# Patient Record
Sex: FEMALE | Race: NATIVE HAWAIIAN OR OTHER PACIFIC ISLANDER
[De-identification: names, ages, dates, MRNs, and addresses within clinical notes are randomized per-mention and may not be internally consistent; named-entity substitution may affect disease eponyms.]

---

## 2021-12-03 ENCOUNTER — HOSPITAL ENCOUNTER (OUTPATIENT)
Dept: HOSPITAL 47 - RADUSWWP | Age: 66
Discharge: HOME | End: 2021-12-03
Payer: MEDICARE

## 2021-12-03 DIAGNOSIS — R10.9: ICD-10-CM

## 2021-12-03 DIAGNOSIS — R31.29: Primary | ICD-10-CM

## 2021-12-03 PROCEDURE — 76770 US EXAM ABDO BACK WALL COMP: CPT

## 2021-12-03 NOTE — US
EXAMINATION TYPE: US kidneys/renal and bladder

 

DATE OF EXAM: 12/3/2021

 

COMPARISON: NONE

 

CLINICAL HISTORY: R31.9 HEMATURIA. Microscopic hematuria.  Left flank pain. 

 

EXAM MEASUREMENTS:

 

Right Kidney:  9.0 x 4.3 x 4.7 cm

Left Kidney: 9.8 x 4.0 x 4.7 cm

 

 

 

Right Kidney: No hydronephrosis or masses seen  

Left Kidney: No hydronephrosis or masses seen  

Bladder: mildly distended, not well visualized

**Bilateral Jets not seen

 

 

There is no evidence for hydronephrosis at this point in time.  No nephrolithiasis is seen.  No sherry
s are identified.  The urinary bladder is not greatly distended.  Bilateral ureteral jets are not see
n.

 

 

 

IMPRESSION: Source of hematuria not identified. If symptoms persist further investigation with CT uro
gram would be warranted.

## 2022-05-25 ENCOUNTER — HOSPITAL ENCOUNTER (OUTPATIENT)
Dept: HOSPITAL 47 - RADMAMWWP | Age: 67
Discharge: HOME | End: 2022-05-25
Attending: INTERNAL MEDICINE
Payer: MEDICARE

## 2022-05-25 DIAGNOSIS — Z78.0: ICD-10-CM

## 2022-05-25 DIAGNOSIS — Z12.31: Primary | ICD-10-CM

## 2022-05-25 DIAGNOSIS — Z80.3: ICD-10-CM

## 2022-05-25 PROCEDURE — 77067 SCR MAMMO BI INCL CAD: CPT

## 2022-05-25 PROCEDURE — 77063 BREAST TOMOSYNTHESIS BI: CPT

## 2022-06-02 NOTE — MM
Reason for Exam: Screening  (asymptomatic). 

Last mammogram was performed 3 year(s) and 9 month(s) ago. 





Patient History: 

Menarche at age 5. First Full-Term Pregnancy at age 20. Postmenopausal. 2002, Bilateral Reduction.

Maternal aunt had breast cancer. 





Risk Values: 

Di 5 year model risk: 1.7%.

NCI Lifetime model risk: 5.5%.





Tissue Density: 

There are scattered fibroglandular densities.





Findings: 

Analyzed By CAD. 

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 Benign-appearing round

calcifications throughout both breasts are redemonstrated. Benign-appearing vascular calcification

in the left breast is again seen. Large dystrophic calcification in the left breast is

redemonstrated.



There is no suspicious group of microcalcifications or new suspicious mass in either breast. 





Overall Assessment: Benign, BI-RAD 2





Management: 

Screening Mammogram of both breasts in 1 year.

A clinical breast exam by your physician is recommended on an annual basis and results should be

correlated with mammographic findings.



Electronically signed and approved by: Rickey Dover M.D.

## 2023-01-09 ENCOUNTER — HOSPITAL ENCOUNTER (INPATIENT)
Dept: HOSPITAL 47 - EC | Age: 68
LOS: 10 days | Discharge: HOME HEALTH | DRG: 481 | End: 2023-01-19
Attending: HOSPITALIST | Admitting: HOSPITALIST
Payer: MEDICARE

## 2023-01-09 VITALS — BODY MASS INDEX: 38.7 KG/M2

## 2023-01-09 DIAGNOSIS — Z96.642: ICD-10-CM

## 2023-01-09 DIAGNOSIS — I10: ICD-10-CM

## 2023-01-09 DIAGNOSIS — F32.A: ICD-10-CM

## 2023-01-09 DIAGNOSIS — S72.001A: Primary | ICD-10-CM

## 2023-01-09 DIAGNOSIS — J44.9: ICD-10-CM

## 2023-01-09 DIAGNOSIS — Z79.83: ICD-10-CM

## 2023-01-09 DIAGNOSIS — Z79.899: ICD-10-CM

## 2023-01-09 DIAGNOSIS — I48.91: ICD-10-CM

## 2023-01-09 DIAGNOSIS — J96.11: ICD-10-CM

## 2023-01-09 DIAGNOSIS — Z20.822: ICD-10-CM

## 2023-01-09 DIAGNOSIS — Z88.0: ICD-10-CM

## 2023-01-09 DIAGNOSIS — Z96.651: ICD-10-CM

## 2023-01-09 DIAGNOSIS — Z79.01: ICD-10-CM

## 2023-01-09 LAB
ALBUMIN SERPL-MCNC: 3.6 G/DL (ref 3.5–5)
ALP SERPL-CCNC: 68 U/L (ref 38–126)
ALT SERPL-CCNC: 7 U/L (ref 4–34)
ANION GAP SERPL CALC-SCNC: 5 MMOL/L
APTT BLD: 24.8 SEC (ref 22–30)
AST SERPL-CCNC: 23 U/L (ref 14–36)
BASOPHILS # BLD AUTO: 0 K/UL (ref 0–0.2)
BASOPHILS NFR BLD AUTO: 0 %
BUN SERPL-SCNC: 14 MG/DL (ref 7–17)
CALCIUM SPEC-MCNC: 8.3 MG/DL (ref 8.4–10.2)
CHLORIDE SERPL-SCNC: 107 MMOL/L (ref 98–107)
CO2 SERPL-SCNC: 26 MMOL/L (ref 22–30)
EOSINOPHIL # BLD AUTO: 0.1 K/UL (ref 0–0.7)
EOSINOPHIL NFR BLD AUTO: 3 %
ERYTHROCYTE [DISTWIDTH] IN BLOOD BY AUTOMATED COUNT: 3.61 M/UL (ref 3.8–5.4)
ERYTHROCYTE [DISTWIDTH] IN BLOOD: 13.3 % (ref 11.5–15.5)
GLUCOSE SERPL-MCNC: 124 MG/DL (ref 74–99)
HCT VFR BLD AUTO: 35.3 % (ref 34–46)
HGB BLD-MCNC: 11.6 GM/DL (ref 11.4–16)
INR PPP: 1 (ref ?–1.2)
LYMPHOCYTES # SPEC AUTO: 0.7 K/UL (ref 1–4.8)
LYMPHOCYTES NFR SPEC AUTO: 14 %
MCH RBC QN AUTO: 32.1 PG (ref 25–35)
MCHC RBC AUTO-ENTMCNC: 32.8 G/DL (ref 31–37)
MCV RBC AUTO: 97.9 FL (ref 80–100)
MONOCYTES # BLD AUTO: 0.2 K/UL (ref 0–1)
MONOCYTES NFR BLD AUTO: 5 %
NEUTROPHILS # BLD AUTO: 3.8 K/UL (ref 1.3–7.7)
NEUTROPHILS NFR BLD AUTO: 78 %
PLATELET # BLD AUTO: 155 K/UL (ref 150–450)
POTASSIUM SERPL-SCNC: 4.4 MMOL/L (ref 3.5–5.1)
PROT SERPL-MCNC: 6.7 G/DL (ref 6.3–8.2)
PT BLD: 10.5 SEC (ref 9–12)
SODIUM SERPL-SCNC: 138 MMOL/L (ref 137–145)
WBC # BLD AUTO: 4.9 K/UL (ref 3.8–10.6)

## 2023-01-09 PROCEDURE — 96376 TX/PRO/DX INJ SAME DRUG ADON: CPT

## 2023-01-09 PROCEDURE — 75635 CT ANGIO ABDOMINAL ARTERIES: CPT

## 2023-01-09 PROCEDURE — 80053 COMPREHEN METABOLIC PANEL: CPT

## 2023-01-09 PROCEDURE — 85610 PROTHROMBIN TIME: CPT

## 2023-01-09 PROCEDURE — 94640 AIRWAY INHALATION TREATMENT: CPT

## 2023-01-09 PROCEDURE — 94760 N-INVAS EAR/PLS OXIMETRY 1: CPT

## 2023-01-09 PROCEDURE — 99285 EMERGENCY DEPT VISIT HI MDM: CPT

## 2023-01-09 PROCEDURE — 85730 THROMBOPLASTIN TIME PARTIAL: CPT

## 2023-01-09 PROCEDURE — 73502 X-RAY EXAM HIP UNI 2-3 VIEWS: CPT

## 2023-01-09 PROCEDURE — 96361 HYDRATE IV INFUSION ADD-ON: CPT

## 2023-01-09 PROCEDURE — 71046 X-RAY EXAM CHEST 2 VIEWS: CPT

## 2023-01-09 PROCEDURE — 96374 THER/PROPH/DIAG INJ IV PUSH: CPT

## 2023-01-09 PROCEDURE — 80048 BASIC METABOLIC PNL TOTAL CA: CPT

## 2023-01-09 PROCEDURE — 96372 THER/PROPH/DIAG INJ SC/IM: CPT

## 2023-01-09 PROCEDURE — 36415 COLL VENOUS BLD VENIPUNCTURE: CPT

## 2023-01-09 PROCEDURE — 85025 COMPLETE CBC W/AUTO DIFF WBC: CPT

## 2023-01-09 PROCEDURE — 87635 SARS-COV-2 COVID-19 AMP PRB: CPT

## 2023-01-09 NOTE — ED
General Adult HPI





- General


Chief complaint: Extremity Problem,Nontraumatic


Stated complaint: left hip pain


Time Seen by Provider: 01/09/23 19:31


Source: EMS


Mode of arrival: EMS





- History of Present Illness


Initial comments: 


Patient is a 67-year-old female presenting with chief complaint of right-sided 

groin pain.  Patient states pain is been ongoing for the last 2 weeks has been 

worsening.  She denies any trauma or falls.  No radiation of the pain.  Patient 

admits to pain with movement and weightbearing.  At rest pain is minimal.  No 

abdominal pain, nausea, vomiting, chest pain, difficulty breathing, 

palpitations.  Patient lives alone but states that her children live nearby and 

help her at home.








- Related Data


                                    Allergies











Allergy/AdvReac Type Severity Reaction Status Date / Time


 


cephalexin [From Keflex] Allergy  Anaphylaxis Verified 01/09/23 19:04














Review of Systems


ROS Statement: 


Those systems with pertinent positive or pertinent negative responses have been 

documented in the HPI.





ROS Other: All systems not noted in ROS Statement are negative.





Past Medical History


Past Medical History: Atrial Fibrillation, COPD, Hypertension


Additional Past Medical History / Comment(s): at home O2


History of Any Multi-Drug Resistant Organisms: None Reported


Past Surgical History: No Surgical Hx Reported


Past Psychological History: Anxiety, Depression


Smoking Status: Never smoker


Past Alcohol Use History: None Reported, Occasional


Past Drug Use History: None Reported





General Exam


Limitations: physical limitation


General appearance: alert, in no apparent distress


Head exam: Present: atraumatic, normocephalic, normal inspection


Eye exam: Present: normal appearance


Neck exam: Present: normal inspection


Respiratory exam: Present: normal lung sounds bilaterally.  Absent: respiratory 

distress, wheezes, rales, rhonchi, stridor


Cardiovascular Exam: Present: regular rate, normal rhythm, normal heart sounds. 

Absent: systolic murmur, diastolic murmur, rubs, gallop, clicks


  ** Right


Hip exam: Present: tenderness.  Absent: full ROM


Neurovascular tendon exam: Present: pulse deficit


Neurological exam: Present: alert, oriented X3, CN II-XII intact


Psychiatric exam: Present: normal affect, normal mood


Skin exam: Present: warm, dry, intact, normal color.  Absent: rash





Course


                                   Vital Signs











  01/09/23 01/09/23 01/09/23





  19:00 20:12 23:00


 


Temperature 97.9 F  


 


Pulse Rate 70 82 75


 


Respiratory 18 16 16





Rate   


 


Blood Pressure 124/64 120/64 144/77


 


O2 Sat by Pulse 97 95 96





Oximetry   














Medical Decision Making





- Medical Decision Making


Was pt. sent in by a medical professional or institution (, PA, NP, urgent 

care, hospital, or nursing home...) When possible be specific


@  -No


Did you speak to anyone other than the patient for history (EMS, parent, family,

police, friend...)? What history was obtained from this source 


@  -No


Did you review nursing and triage notes (agree or disagree)?  Why? 


@  -I reviewed and agree with nursing and triage notes


Were old charts reviewed (outside hosp., previous admission, EMS record, old 

EKG, old radiological studies, urgent care reports/EKG's, nursing home records)?

Report findings 


@  -No old charts were reviewed


Differential Diagnosis (chest pain, altered mental status, abdominal pain women,

abdominal pain men, vaginal bleeding, weakness, fever, dyspnea, syncope, 

headache, dizziness, GI bleed, back pain, seizure, CVA, palpatations, mental 

health)? 


@  -Differential includes fracture, arthritis, vascular disease


EKG interpreted by me (3pts min.).


@  -As above


X-rays interpreted by me (1pt min.).


@  -None done


CT interpreted by me (1pt min.).


@  -No, radiologist report reviewed.  There is diminished distal right anterior 

tibial artery flow at the ankle.  This is likely related to hemodynamic 

stenosis.  Otherwise negative CT angiogram of the abdomen and pelvis with 

runoff.


U/S interpreted by me (1pt. min.).


@  -None done


What testing was considered but not performed or refused? (CT, X-rays, U/S, lab

s)? Why?


@  -X-ray was considered, however patient is getting CT which will visualize the

area of concern


What meds were considered but not given or refused? Why?


@  -None


Did you discuss the management of the patient with other professionals 

(professionals i.e. , PA, NP, lab, RT, psych nurse, , , 

teacher, , )? Give summary


@  -Case discussed with admitting doctor Dr. Mart


Was smoking cessation discussed for >3mins.?


@  -No


Was critical care preformed (if so, how long)?


@  -No


Were there social determinants of health that impacted care today? How? 

(Homelessness, low income, unemployed, alcoholism, drug addiction, 

transportation, low edu. Level, literacy, decrease access to med. care, retirement, 

rehab)?


@  -No


Was there de-escalation of care discussed even if they declined (Discuss DNR or 

withdrawal of care, Hospice)? DNR status


@  -No


What co-morbidities impacted this encounter? (DM, HTN, Smoking, COPD, CAD, 

Cancer, CVA, ARF, Chemo, Hep., AIDS, mental health diagnosis, sleep apnea, 

morbid obesity)?


@  -None


Was patient admitted / discharged? Hospital course, mention meds given and 

route, prescriptions, significant lab abnormalities, going to OR and other 

pertinent info.


@  -Patient is a 67-year-old female presenting with chief complaint of right hip

pain.  Pain is been ongoing for the last 2 weeks.  Patient lives alone and is 

now having difficulty ambulating even with her walker.  On physical examination 

there is pain to palpation of the hip, there is also pain with range of motion. 

No recent injury or trauma.  There is a diminished right dorsal pedal pulse.  

Able to obtain signal with Doppler.  CT angiography does show stenosis of the 

anterior tibial artery flow at the ankle.  There is no signs of acute necrosis, 

no discoloration, no foot or ankle pain.  Patient is continuing to have hip pain

after pain medication.  She will be admitted for inability to ambulate.  I 

discussed this case with Dr. Mart who accepted admission.  Patient is agreeable

with this plan.  I discussed this case with my attending Dr. Walker


Undiagnosed new problem with uncertain prognosis?


@  -No


Drug Therapy requiring intensive monitoring for toxicity (Heparin, Nitro, Insul

in, Cardizem)?


@  -No


Were any procedures done?


@  -No


Diagnosis/symptom?


@  -Hip pain with inability to ambulate


Acute, or Chronic, or Acute on Chronic?


@  -Acute


Uncomplicated (without systemic symptoms) or Complicated (systemic symptoms)?


@  -Complicated


Side effects of treatment?


@  -No


Exacerbation, Progression, or Severe Exacerbation?


@  -No


Poses a threat to life or bodily function? How? (Chest pain, USA, MI, pneumonia,

PE, COPD, DKA, ARF, appy, cholecystitis, CVA, Diverticulitis, Homicidal, 

Suicidal, threat to staff... and all critical care pts)


@  -No








- Lab Data


Result diagrams: 


                                 01/09/23 20:12





                                 01/09/23 20:12


                                   Lab Results











  01/09/23 01/09/23 01/09/23 Range/Units





  20:12 20:12 20:12 


 


WBC  4.9    (3.8-10.6)  k/uL


 


RBC  3.61 L    (3.80-5.40)  m/uL


 


Hgb  11.6    (11.4-16.0)  gm/dL


 


Hct  35.3    (34.0-46.0)  %


 


MCV  97.9    (80.0-100.0)  fL


 


MCH  32.1    (25.0-35.0)  pg


 


MCHC  32.8    (31.0-37.0)  g/dL


 


RDW  13.3    (11.5-15.5)  %


 


Plt Count  155    (150-450)  k/uL


 


MPV  8.7    


 


Neutrophils %  78    %


 


Lymphocytes %  14    %


 


Monocytes %  5    %


 


Eosinophils %  3    %


 


Basophils %  0    %


 


Neutrophils #  3.8    (1.3-7.7)  k/uL


 


Lymphocytes #  0.7 L    (1.0-4.8)  k/uL


 


Monocytes #  0.2    (0-1.0)  k/uL


 


Eosinophils #  0.1    (0-0.7)  k/uL


 


Basophils #  0.0    (0-0.2)  k/uL


 


Hypochromasia  Slight    


 


PT   10.5   (9.0-12.0)  sec


 


INR   1.0   (<1.2)  


 


APTT   24.8   (22.0-30.0)  sec


 


Sodium    138  (137-145)  mmol/L


 


Potassium    4.4  (3.5-5.1)  mmol/L


 


Chloride    107  ()  mmol/L


 


Carbon Dioxide    26  (22-30)  mmol/L


 


Anion Gap    5  mmol/L


 


BUN    14  (7-17)  mg/dL


 


Creatinine    0.63  (0.52-1.04)  mg/dL


 


Est GFR (CKD-EPI)AfAm    >90  (>60 ml/min/1.73 sqM)  


 


Est GFR (CKD-EPI)NonAf    >90  (>60 ml/min/1.73 sqM)  


 


Glucose    124 H  (74-99)  mg/dL


 


Calcium    8.3 L  (8.4-10.2)  mg/dL


 


Total Bilirubin    0.5  (0.2-1.3)  mg/dL


 


AST    23  (14-36)  U/L


 


ALT    7  (4-34)  U/L


 


Alkaline Phosphatase    68  ()  U/L


 


Total Protein    6.7  (6.3-8.2)  g/dL


 


Albumin    3.6  (3.5-5.0)  g/dL














Disposition


Clinical Impression: 


 Hip pain, Inability to ambulate due to hip





Disposition: ADMITTED AS IP TO THIS [facility]


Condition: Fair


Referrals: 


Marlo Marks MD [Primary Care Provider] - 1-2 days


Time of Disposition: 23:30


Decision to Admit Reason: Admit from EC


Decision Date: 01/09/23


Decision Time: 23:30

## 2023-01-09 NOTE — CT
EXAMINATION TYPE: CT angio abd aorta w/Runoff

 

DATE OF EXAM: 1/9/2023

 

COMPARISON: None

 

HISTORY: L leg pain, diminished pulses

 

CT DLP: 2821.4 mGycm

Automated exposure control for dose reduction was used.

 

CONTRAST: 

Performed with IV Contrast, patient injected with 100ml mL of Isovue 370.

 

Images obtained from the diaphragm to the bottom of the feet without and with the IV contrast. There 
are 3-D post processed images.

 

 

 

There is some patchy infiltrate and atelectasis at the lung bases. Heart is enlarged. No pericardial 
effusion. Liver and spleen are intact. No pancreatic mass. The bowel is not dilated. There is no adre
nal mass. Kidneys show satisfactory contrast opacification. No hydronephrosis. No retroperitoneal kirk
nopathy. There is inferior vena cava filter.

 

There are midline sutures over the anterior abdominal wall. Bladder distends smoothly. No inguinal he
rnia. No free fluid in the pelvis. No pelvic mass.

 

There is arterial flow in the abdominal aorta and the celiac artery and superior mesenteric artery. T
here is arterial flow within both renal arteries and the inferior mesenteric artery. There is arteria
l flow in the iliac and femoral arteries. No significant plaque seen in the abdomen. There is arteria
l flow in the superficial femoral arteries and the profunda femoris arteries bilaterally. There is bi
lateral arterial flow in the tibial and popliteal arteries. There is wide patency of the tibial arter
y trifurcations. There is metal artifact obscuring the right knee. There is right knee prosthesis.

 

No significant distal flow seen in the right anterior distal tibial artery. There is arterial flow in
 the right posterior tibial artery at the ankle. There is small amount of arterial flow demonstrated 
at the metatarsals of the right foot. This is from the posterior tibial artery.

 

On the left side there is posterior tibial artery flow to the distal metatarsals. There is left side 
dorsalis pedis artery flow.

 

No focal bone destruction. No evidence of abdominal aortic aneurysm or dissection. The lumbar spine i
s intact. No compression fracture.

 

IMPRESSION:

There is diminished distal right anterior tibial artery flow at the ankle. This is likely related to 
hemodynamic stenosis. Otherwise negative CT angiogram of the abdomen and pelvis with runoff.

 

Mild atherosclerotic vascular disease. Bilateral lower lobe pulmonary infiltrates and atelectasis. Ca
rdiomegaly. I do not see a cause for decreased pulses in the left leg.

## 2023-01-10 RX ADMIN — CARBIDOPA AND LEVODOPA SCH EACH: 25; 100 TABLET ORAL at 20:08

## 2023-01-10 RX ADMIN — HEPARIN SODIUM SCH UNIT: 5000 INJECTION INTRAVENOUS; SUBCUTANEOUS at 13:12

## 2023-01-10 RX ADMIN — HEPARIN SODIUM SCH UNIT: 5000 INJECTION INTRAVENOUS; SUBCUTANEOUS at 20:08

## 2023-01-10 RX ADMIN — HEPARIN SODIUM SCH: 5000 INJECTION INTRAVENOUS; SUBCUTANEOUS at 17:55

## 2023-01-10 RX ADMIN — HYDROCODONE BITARTRATE AND ACETAMINOPHEN PRN EACH: 5; 325 TABLET ORAL at 12:00

## 2023-01-10 RX ADMIN — CARBIDOPA AND LEVODOPA SCH EACH: 25; 100 TABLET ORAL at 10:47

## 2023-01-10 RX ADMIN — HYDROCODONE BITARTRATE AND ACETAMINOPHEN PRN EACH: 5; 325 TABLET ORAL at 18:48

## 2023-01-10 RX ADMIN — CEFAZOLIN SCH MLS/HR: 330 INJECTION, POWDER, FOR SOLUTION INTRAMUSCULAR; INTRAVENOUS at 00:30

## 2023-01-10 RX ADMIN — ASPIRIN SCH: 325 TABLET ORAL at 10:48

## 2023-01-10 RX ADMIN — MORPHINE SULFATE PRN MG: 4 INJECTION, SOLUTION INTRAMUSCULAR; INTRAVENOUS at 08:50

## 2023-01-10 RX ADMIN — SERTRALINE HYDROCHLORIDE SCH MG: 100 TABLET ORAL at 10:47

## 2023-01-10 RX ADMIN — ATORVASTATIN CALCIUM SCH MG: 40 TABLET, FILM COATED ORAL at 20:08

## 2023-01-10 RX ADMIN — CEFAZOLIN SCH MLS/HR: 330 INJECTION, POWDER, FOR SOLUTION INTRAMUSCULAR; INTRAVENOUS at 13:11

## 2023-01-10 NOTE — P.HPIM
History of Present Illness


H&P Date: 01/10/23











This is a 67-year-old female who presented to the emergency department via EMS 

with concerns of right-sided groin and hip pain.  Patient reports she has been 

having worsening pain of the hip area over the last 2 weeks.  Patient denies any

injury or falls most recently.  Patient reports she follows with Dr. Marks in 

the outpatient setting with a past medical history of atrial fibrillation, COPD,

hypertension, anxiety/depression and denies any illicit drug use or alcohol and 

reports to never smoking.  Patient reports approximately 2 years ago she had her

left hip repaired as she broke the femur head off her left leg requiring 

intervention.  Patient reports she has been compensating since the surgery and 

does have some pain that she reports is chronic.  Patient does have a walker and

a cane at the home.  Patient had CT angiograph due to her diminished pulses and 

left leg pain showing some diminished distal right anterior tibial artery flow 

at the ankle likely related to hemodynamic stenosis otherwise negative along 

with some bilateral lower lobe infiltrates and atelectasis of what she does have

chronic COPD and wears oxygen outpatient.  Orthopedics was consulted and a left 

hip x-ray was done with highly suspicious acute minimally displaced fracture of 

the right femoral neck.  Pulmonary patient reviewed and resumed and will 

continue with heparin subcu and hold eliquis in the event of surgical 

intervention which is tentatively scheduled for tomorrow.  Repeat CT of the hip 

is ordered and pending.  Labs reviewed and within normal limits from ER 

admission.





Review Of Systems:


Constitutional: No fever, no chills, no night sweats.  No weight change.  No 

weakness, fatigue or lethargy.  No daytime sleepiness.


EENT: No headache.  No blurred vision or double vision, no loss of vision.  No 

loss of Hearing, no ringing in the ears, no dizziness.  No nasal drainage or 

congestion.  No epistaxis.  No sore throat.


Lungs: No shortness of breath, cough, no sputum production.  No wheezing.


Cardiovascular: No chest pain, no lower extremity edema.  No palpitations.  No 

paroxysmal nocturnal dyspnea.  No orthopnea.  No lightheadedness or dizziness.  

No syncopal episodes.


Abdominal: No abdominal pain.  No nausea, vomiting.  No diarrhea.  No 

constipation.  No bloody or tarry stools..  No loss of appetite.


Genitourinary: No dysuria, increased frequency, urgency.  No urinary retention.


Musculoskeletal: Reports myalgias.  No muscle weakness, reports gait dysfunction

and inability to ambulate due to right hip pain, no frequent falls.  No back 

pain.  No neck pain.


Integumentary: No wounds, no lesions.  No rash or pruritus.  No unusual 

bruising.  No change in hair or nails.


Neurologic: No aphasia. No facial droop. No change in mentation. No head injury.

No headache. No paralysis. No paresthesia.


Psychiatric: No depression.  No anxiety.  No mood swings.


Endocrine: No abnormal blood sugars.  No weight change.  No excessive sweating 

or thirst.  No cold intolerance.  











PHYSICAL EXAMINATION: 





GENERAL: The patient is alert and oriented x4,  Well developed, well nourished. 


HEENT: Pupils are round and equally reacting to light. EOMI. no scleral icterus.

No conjunctival pallor. Normocephalic, atraumatic. No pharyngeal erythema. No 

thyromegaly.  


CARDIOVASCULAR: S1 and S2  muffled 


PULMONARY: diminished breath sounds bilaterally with no wheezing or rhonchi 

noted. 


ABDOMEN: soft.  Nontender on exam.  obese. non-distended, normoactive bowel 

sounds. No palpable organomegaly. 


MUSCULOSKELETAL: No joint swelling or deformity.


EXTREMITIES: No cyanosis, clubbing, or pedal edema.  


NEUROLOGICAL: Gross neurological examination did not reveal any focal deficits. 

Diffuse weakness


SKIN: No rashes. 





Assessment:





Right hip pain with inability to ambulate, secondary to minimally displaced 

fracture of the right femoral neck


History of atrial fibrillation


History of COPD, not an exacerbation


Chronic hypoxic respiratory failure secondary to COPD, wears 2-3 L via nasal 

cannula outpatient


History of hypertension


History of anxiety/depression


GI prophylaxis


DVT prophylaxis


Full code





Plan:





Recommend to continue with current medications and management and orthopedics 

consulted


Right hip x-ray shows a minimally displaced fracture at the right femoral neck 

and plans are for surgical intervention in the a.m.


Home medications have been reviewed and resumed and will hold eliquis for now 

and continue subcutaneous heparin every 12 as patient is going for surgery 

tomorrow.  Recommend to hold heparin after midnight and patient will be nothing 

by mouth


We'll provide incentive spirometer and encourage the patient use at least 10 

times every hour while awake


Will follow-up with a.m. labs


Will have PT/OT evaluate the patient post surgery


Patient is of low risk for surgical intervention and risks versus benefits were 

explained and patient is agreeable to proceed with surgery intervention.











The impression and plan of care has been dictated by Myrtle Hyman, nurse 

practitioner as directed.





Dr. Chris MD


I have performed a history and examination and MDM of this patient, discussed 

the same with the dictator, and  agree with the dictator's assessment and plan 

as written ,documented as a scribe. Based on total visit time,  I have performed

more than 50% of the visit. Any additional findings or plans will be noted. 





Past Medical History


Past Medical History: Atrial Fibrillation, COPD, Hypertension


Additional Past Medical History / Comment(s): at home O2


History of Any Multi-Drug Resistant Organisms: None Reported


Past Surgical History: No Surgical Hx Reported


Past Psychological History: Anxiety, Depression


Smoking Status: Never smoker


Past Alcohol Use History: None Reported, Occasional


Past Drug Use History: None Reported





Medications and Allergies


                                Home Medications











 Medication  Instructions  Recorded  Confirmed  Type


 


ARIPiprazole [Abilify] 2 mg PO DAILY 01/10/23 01/10/23 History


 


Alendronate Sodium [Fosamax] 70 mg PO Q7D 01/10/23 01/10/23 History


 


Apixaban [Eliquis] 5 mg PO BID 01/10/23 01/10/23 History


 


Atorvastatin [Lipitor] 40 mg PO HS 01/10/23 01/10/23 History


 


Carbidopa/Levodopa [Sinemet  1 tab PO BID 01/10/23 01/10/23 History





mg Tablet]    


 


Dicyclomine [Bentyl] 10 mg PO QID PRN 01/10/23 01/10/23 History


 


Enalapril [Vasotec] 10 mg PO DAILY 01/10/23 01/10/23 History


 


Furosemide [Lasix] 40 mg PO DAILY 01/10/23 01/10/23 History


 


Isosorbide Mononitrate ER [Imdur] 60 mg PO DAILY 01/10/23 01/10/23 History


 


Liothyronine Sodium [Cytomel] 25 mcg PO DAILY 01/10/23 01/10/23 History


 


Nitroglycerin Sl Tabs [Nitrostat] 0.4 mg SL Q5M PRN 01/10/23 01/10/23 History


 


Sertraline [Zoloft] 200 mg PO DAILY 01/10/23 01/10/23 History


 


carvediloL [Coreg] 3.125 mg PO BID 01/10/23 01/10/23 History


 


lisinopriL [Zestril] 5 mg PO DAILY 01/10/23 01/10/23 History


 


traZODone  mg PO HS 01/10/23 01/10/23 History


 


traZODone HCL [Desyrel] 100 mg PO DAILY@0400 01/10/23 01/10/23 History








                                    Allergies











Allergy/AdvReac Type Severity Reaction Status Date / Time


 


cephalexin [From Keflex] Allergy  Mouth Verified 01/10/23 07:19





   swelling  














Physical Exam


Vitals: 


                                   Vital Signs











  Temp Pulse Resp BP Pulse Ox


 


 01/10/23 08:46   76  18  117/56  98


 


 01/10/23 04:51   73  16  120/54  97


 


 01/09/23 23:00   75  16  144/77  96


 


 01/09/23 20:12   82  16  120/64  95


 


 01/09/23 19:00  97.9 F  70  18  124/64  97








                                Intake and Output











 01/09/23 01/10/23 01/10/23





 22:59 06:59 14:59


 


Other:   


 


  Weight 83.461 kg  














Results


CBC & Chem 7: 


                                 01/09/23 20:12





                                 01/09/23 20:12


Labs: 


                  Abnormal Lab Results - Last 24 Hours (Table)











  01/09/23 01/09/23 Range/Units





  20:12 20:12 


 


RBC  3.61 L   (3.80-5.40)  m/uL


 


Lymphocytes #  0.7 L   (1.0-4.8)  k/uL


 


Glucose   124 H  (74-99)  mg/dL


 


Calcium   8.3 L  (8.4-10.2)  mg/dL














Thrombosis Risk Factor Assmnt





- DVT/VTE Prophylaxis


DVT/VTE Prophylaxis: Pharmacologic Prophylaxis ordered





Assessment and Plan


Time with Patient: Greater than 30

## 2023-01-10 NOTE — XR
EXAMINATION TYPE: XR Hip RT and AP Pelvis

 

DATE OF EXAM: 1/10/2023

 

COMPARISON: NONE

 

HISTORY: Pain

 

TECHNIQUE: A single AP view of the pelvis is obtained. Two views of the right hip are obtained.  

 

FINDINGS:  There is high contrast within the bladder from the patient's recent CT scan. Postsurgical 
change involving the left hip with heterotopic ossification. There is bilateral hip joint space narro
wing compatible with arthropathy. There is a subtle lucency involving the right femoral neck. Chronic
 appearing deformity of the pubic symphysis

 

IMPRESSION:

1. Findings are highly suggestive of a acute minimally displaced fracture of the right femoral neck  
above the intertrochanteric line.

## 2023-01-10 NOTE — CT
EXAMINATION TYPE: CT hip RT wo con

CT DLP: 527 mGycm, Automated exposure control for dose reduction was used.

 

DATE OF EXAM: 1/10/2023 1:09 PM

 

COMPARISON: Extremity radiograph same day.

 

CLINICAL INDICATION:Female, 67 years old with history of fracture; pain; PHH, fall right hip pain

 

TECHNIQUE: Axial images were obtained of the right hip without the use of IV contrast.  Additional co
isabelle and sagittal reformatted images and soft tissue and bone window were obtained for review. 3-D r
econstruction was created on a separate workstation. 

 

FINDINGS: Acute nondisplaced fracture through the right femoral neck (series 202, image 48). No dislo
cation. Small joint effusion is demonstrated. Mild osteoarthritic changes of the right hip with media
l joint space narrowing and marginal acetabular osteophytosis. Right lateral hip soft tissue edema. N
o radiopaque foreign body identified. Calcification within the right flank soft tissues. Partial visu
alization of postsurgical changes with surgical clips in the anterior abdomen wall. Partial visualiza
tion of contrast-filled urinary bladder.

 

 

IMPRESSION:

Acute nondisplaced fracture through the right femoral neck.

## 2023-01-10 NOTE — P.CNOR
History of Present Illness





- [facility]


Consult date: 01/10/23


Requesting physician: Dewey Bee


Consult reason: other (Right groin pain)


History of present illness: 





History of Presenting Illness





Patient is a 67-year-old female who presented to the ER for increased right 

groin pain.   Patient reports onset was approximately 2 weeks ago and has 

progressively worsened.  She is currently having difficulty bearing weight on 

right lower extremity.  Patient denies any trauma or injury.  She denies any num

bness or tingling to right lower extremity.  She states that she normally is 

ambulatory with walker or cane.  Patient does lives alone, her family is nearby 

to check on her daily.  Her orthopedic history includes a right total knee 

arthroplasty, left hip arthroplasty, and a left shoulder reversal. 





Patient is currently resting on a stretcher in the ER, awaiting bed placement. 

She denies pain at this time, increases in right groin with movement or 

activity. Patient denies any back pain. She states she can ambulate with walker,

but there is severe pain in her right groin. She does state her pain is managed 

on current regimen. Informed patient that we will order xrays and we will update

her with POC when we have results. 








Review of Systems





Pertinent positives and negatives as discussed in HPI, a complete review of 

systems was performed and all other systems are negative.








Physical Examination





General:  The patient is awake and alert, in no acute distress


Skin:  Skin is warm and dry with no obvious rashes or lesions. Hairy patches 

absent, no dorsal skin dimples, no cafe au lait spots, and no surgical 

incisions. 


Eye: Pupils are equal, round and reactive to light, extra-ocular movements are 

intact; there is normal conjunctiva bilaterally.  


Neck:  The neck is supple, there is no tenderness and ROM intact.


Cardiovascular:  There is a regular rate and rhythm. No murmur, rub or gallop is

appreciated.


Respiratory:  Lungs are clear to auscultation, respirations are non-labored, 

breath sounds are equal.  


Gastrointestinal:  Soft, non-distended, non-tender abdomen.


Back:  There is no tenderness to palpation in the midline, paralumbar, 

parathoracic or buttocks region. There is no obvious deformity.


Musculoskeletal: ROM limited secondary to pain and stiffness from surgical 

procedure. Shoulder abduction 5/5, elbow flexors 5/5, wrist dorsiflexors 5/5. 

finger abductor 5/5,  5/5, hip flexor 5/5, knee flexor 5/5, ankle 

dorsiflexor 5/5, ankle plantarflexion 5/5 and extensor hallucis 5/5. 


Neurological:  CN 2-12 intact. There are no obvious motor or sensory deficits. 

Movement and coordination equal and intact. Sensory exam to light touch intact 

C5-T1 and intact from L2-S1. Reflexes 2/4 in bilateral upper and lower 

extremities. Negative Hoffmans, babinski, and clonus signs. 


Psychiatric:  Cooperative, appropriate mood & affect, normal judgment.  





Assessment and Plan





1. Right groin pain





2. Inability to ambulate








-Appreciate consultant and team management.  





-X-rays of right hip and Pelvis ordered, awaiting results





-Pain control: Adequate at this time





-Meds: reviewed





-Encourage IS 10x/hr











*I reviewed and discussed this case with my attending Dr. Rousseau, whom has 

reviewed this chart and films and is in agreement with assessment and plan of 

care as outlined above.





I have personally seen and examined the patient, performed the documentation and

the assessment and plan as written.  





Number of minutes spent on the visit: 20m. 


 





Past Medical History


Past Medical History: Atrial Fibrillation, COPD, Hypertension


Additional Past Medical History / Comment(s): at home O2


History of Any Multi-Drug Resistant Organisms: None Reported


Past Surgical History: No Surgical Hx Reported


Past Psychological History: Anxiety, Depression


Smoking Status: Never smoker


Past Alcohol Use History: None Reported, Occasional


Past Drug Use History: None Reported





Medications and Allergies


                                Home Medications











 Medication  Instructions  Recorded  Confirmed  Type


 


ARIPiprazole [Abilify] 2 mg PO DAILY 01/10/23 01/10/23 History


 


Alendronate Sodium [Fosamax] 70 mg PO Q7D 01/10/23 01/10/23 History


 


Apixaban [Eliquis] 5 mg PO BID 01/10/23 01/10/23 History


 


Atorvastatin [Lipitor] 40 mg PO HS 01/10/23 01/10/23 History


 


Carbidopa/Levodopa [Sinemet  1 tab PO BID 01/10/23 01/10/23 History





mg Tablet]    


 


Dicyclomine [Bentyl] 10 mg PO QID PRN 01/10/23 01/10/23 History


 


Enalapril [Vasotec] 10 mg PO DAILY 01/10/23 01/10/23 History


 


Furosemide [Lasix] 40 mg PO DAILY 01/10/23 01/10/23 History


 


Isosorbide Mononitrate ER [Imdur] 60 mg PO DAILY 01/10/23 01/10/23 History


 


Liothyronine Sodium [Cytomel] 25 mcg PO DAILY 01/10/23 01/10/23 History


 


Nitroglycerin Sl Tabs [Nitrostat] 0.4 mg SL Q5M PRN 01/10/23 01/10/23 History


 


Sertraline [Zoloft] 200 mg PO DAILY 01/10/23 01/10/23 History


 


carvediloL [Coreg] 3.125 mg PO BID 01/10/23 01/10/23 History


 


lisinopriL [Zestril] 5 mg PO DAILY 01/10/23 01/10/23 History


 


traZODone  mg PO HS 01/10/23 01/10/23 History


 


traZODone HCL [Desyrel] 100 mg PO DAILY@0400 01/10/23 01/10/23 History








                                    Allergies











Allergy/AdvReac Type Severity Reaction Status Date / Time


 


cephalexin [From Keflex] Allergy  Mouth Verified 01/10/23 07:19





   swelling  














Results





- Labs


Labs: 


                  Abnormal Lab Results - Last 24 Hours (Table)











  01/09/23 01/09/23 Range/Units





  20:12 20:12 


 


RBC  3.61 L   (3.80-5.40)  m/uL


 


Lymphocytes #  0.7 L   (1.0-4.8)  k/uL


 


Glucose   124 H  (74-99)  mg/dL


 


Calcium   8.3 L  (8.4-10.2)  mg/dL








                                      H & H











  01/09/23 Range/Units





  20:12 


 


Hgb  11.6  (11.4-16.0)  gm/dL


 


Hct  35.3  (34.0-46.0)  %








                                   Coagulation











  01/09/23 Range/Units





  20:12 


 


INR  1.0  (<1.2)  











Result Diagrams: 


                                 01/09/23 20:12





                                 01/09/23 20:12

## 2023-01-11 LAB
BASOPHILS # BLD AUTO: 0 K/UL (ref 0–0.2)
BASOPHILS NFR BLD AUTO: 0 %
EOSINOPHIL # BLD AUTO: 0 K/UL (ref 0–0.7)
EOSINOPHIL NFR BLD AUTO: 1 %
ERYTHROCYTE [DISTWIDTH] IN BLOOD BY AUTOMATED COUNT: 3.29 M/UL (ref 3.8–5.4)
ERYTHROCYTE [DISTWIDTH] IN BLOOD: 13.2 % (ref 11.5–15.5)
HCT VFR BLD AUTO: 32.6 % (ref 34–46)
HGB BLD-MCNC: 10.3 GM/DL (ref 11.4–16)
LYMPHOCYTES # SPEC AUTO: 0.3 K/UL (ref 1–4.8)
LYMPHOCYTES NFR SPEC AUTO: 8 %
MCH RBC QN AUTO: 31.2 PG (ref 25–35)
MCHC RBC AUTO-ENTMCNC: 31.5 G/DL (ref 31–37)
MCV RBC AUTO: 99 FL (ref 80–100)
MONOCYTES # BLD AUTO: 0.1 K/UL (ref 0–1)
MONOCYTES NFR BLD AUTO: 2 %
NEUTROPHILS # BLD AUTO: 3.2 K/UL (ref 1.3–7.7)
NEUTROPHILS NFR BLD AUTO: 89 %
PLATELET # BLD AUTO: 147 K/UL (ref 150–450)
WBC # BLD AUTO: 3.6 K/UL (ref 3.8–10.6)

## 2023-01-11 PROCEDURE — 0QH604Z INSERTION OF INTERNAL FIXATION DEVICE INTO RIGHT UPPER FEMUR, OPEN APPROACH: ICD-10-PCS

## 2023-01-11 RX ADMIN — DOCUSATE SODIUM AND SENNOSIDES SCH EACH: 50; 8.6 TABLET ORAL at 20:52

## 2023-01-11 RX ADMIN — HYDROCODONE BITARTRATE AND ACETAMINOPHEN PRN EACH: 5; 325 TABLET ORAL at 07:27

## 2023-01-11 RX ADMIN — CEFAZOLIN SCH MLS/HR: 330 INJECTION, POWDER, FOR SOLUTION INTRAMUSCULAR; INTRAVENOUS at 15:05

## 2023-01-11 RX ADMIN — MORPHINE SULFATE PRN MG: 4 INJECTION, SOLUTION INTRAMUSCULAR; INTRAVENOUS at 18:31

## 2023-01-11 RX ADMIN — MORPHINE SULFATE PRN MG: 4 INJECTION, SOLUTION INTRAMUSCULAR; INTRAVENOUS at 22:29

## 2023-01-11 RX ADMIN — SERTRALINE HYDROCHLORIDE SCH MG: 100 TABLET ORAL at 07:24

## 2023-01-11 RX ADMIN — CEFAZOLIN SCH MLS/HR: 330 INJECTION, POWDER, FOR SOLUTION INTRAMUSCULAR; INTRAVENOUS at 20:57

## 2023-01-11 RX ADMIN — CARBIDOPA AND LEVODOPA SCH EACH: 25; 100 TABLET ORAL at 07:24

## 2023-01-11 RX ADMIN — ATORVASTATIN CALCIUM SCH MG: 40 TABLET, FILM COATED ORAL at 20:53

## 2023-01-11 RX ADMIN — CARBIDOPA AND LEVODOPA SCH EACH: 25; 100 TABLET ORAL at 20:53

## 2023-01-11 RX ADMIN — HYDROCODONE BITARTRATE AND ACETAMINOPHEN PRN EACH: 5; 325 TABLET ORAL at 20:53

## 2023-01-11 RX ADMIN — ASPIRIN SCH: 325 TABLET ORAL at 07:28

## 2023-01-11 RX ADMIN — CEFAZOLIN SCH MLS/HR: 330 INJECTION, POWDER, FOR SOLUTION INTRAMUSCULAR; INTRAVENOUS at 01:53

## 2023-01-11 RX ADMIN — ISOSORBIDE MONONITRATE SCH MG: 60 TABLET, EXTENDED RELEASE ORAL at 07:24

## 2023-01-11 NOTE — FL
EXAMINATION TYPE: FL guidance operating room

 

DATE OF EXAM: 1/11/2023

 

HISTORY: Fluoroscopy  time

 

56 seconds of fluoroscopy provided. 

 

IMPRESSION:

1. Fluoroscopy time.

## 2023-01-11 NOTE — P.PN
Subjective


Progress Note Date: 01/11/23




















This is a 67-year-old female who presented to the emergency department via EMS 

with concerns of right-sided groin and hip pain.  Patient reports she has been 

having worsening pain of the hip area over the last 2 weeks.  Patient denies any

injury or falls most recently.  Patient reports she follows with Dr. Marks in 

the outpatient setting with a past medical history of atrial fibrillation, COPD,

hypertension, anxiety/depression and denies any illicit drug use or alcohol and 

reports to never smoking.  Patient reports approximately 2 years ago she had her

left hip repaired as she broke the femur head off her left leg requiring 

intervention.  Patient reports she has been compensating since the surgery and 

does have some pain that she reports is chronic.  Patient does have a walker and

a cane at the home.  Patient had CT angiograph due to her diminished pulses and 

left leg pain showing some diminished distal right anterior tibial artery flow 

at the ankle likely related to hemodynamic stenosis otherwise negative along 

with some bilateral lower lobe infiltrates and atelectasis of what she does have

chronic COPD and wears oxygen outpatient.  Orthopedics was consulted and a left 

hip x-ray was done with highly suspicious acute minimally displaced fracture of 

the right femoral neck.  Pulmonary patient reviewed and resumed and will 

continue with heparin subcu and hold eliquis in the event of surgical 

intervention which is tentatively scheduled for tomorrow.  Repeat CT of the hip 

is ordered and pending.  Labs reviewed and within normal limits from ER 

admission.





01/11/2023








Patient is seen and evaluated in follow-up this morning no acute overnight 

issues noted.  Patient continues to have right hip pain with orthopedics 

following and plan is for ORIF with nailing or pinning of the fracture.  Patient

is currently nothing by mouth and will remain until post-surgery.  Will resume 

home medications and continue to monitor closely.  Will await surgical report.  

Patient is afebrile and medically stable for surgery today. 





Review of systems:


Constitutional: No reports of fatigue, fever, or chills


Cardiovascular: No reports of chest pain or palpitations


Respiratory: No reports of shortness of breath or cough


GI: No reports of nausea, vomiting, or diarrhea


: No reports of dysuria or retention


Neurovascular:  reports of weakness and continued right hip pain





All medications have been reviewed














PHYSICAL EXAMINATION: 





GENERAL: The patient is alert and oriented x4,  Well developed, well nourished. 

Obese.


HEENT: Pupils are round and equally reacting to light. EOMI. no scleral icterus.

No conjunctival pallor. Normocephalic, atraumatic. No pharyngeal erythema. No 

thyromegaly.  


CARDIOVASCULAR: S1 and S2  muffled 


PULMONARY: diminished breath sounds bilaterally with no wheezing or rhonchi 

noted. 


ABDOMEN: soft.  Nontender on exam.  obese. non-distended, normoactive bowel 

sounds. No palpable organomegaly. 


MUSCULOSKELETAL: No joint swelling or deformity.


EXTREMITIES: No cyanosis, clubbing, or pedal edema.  right hip pain on palpation


NEUROLOGICAL: Gross neurological examination did not reveal any focal deficits. 

Diffuse weakness


SKIN: No rashes. 





Assessment:





Right hip pain with inability to ambulate, secondary to minimally displaced 

fracture of the right femoral neck


History of atrial fibrillation


History of COPD, not an exacerbation


Chronic hypoxic respiratory failure secondary to COPD, wears 2-3 L via nasal 

cannula outpatient


History of hypertension


History of anxiety/depression


GI prophylaxis


DVT prophylaxis


Full code





Plan:





Recommend to continue with current medications and management and orthopedics 

following and planning ORIF today


Home medications have been reviewed and resumed and will hold eliquis for now 

and continue subcutaneous heparin every 12 as patient is going for surgery 

today. Will discuss with ortho when ok to resume oral Eliquis. 


Currently NPO and encouraged oral intake after surgery


Recommend  incentive spirometer and encourage the patient use at least 10 times 

every hour while awake


Will follow-up with a.m. labs


Will have PT/OT evaluate the patient post surgery


Patient is of low risk for surgical intervention and risks versus benefits were 

explained and patient is agreeable to proceed with surgery intervention.











The impression and plan of care has been dictated by Myrtle Hyman, nurse 

practitioner as directed.





Dr. Chris MD


I have performed a history and examination and MDM of this patient, discussed 

the same with the dictator, and  agree with the dictator's assessment and plan 

as written ,documented as a scribe. Based on total visit time,  I have performed

more than 50% of the visit. Any additional findings or plans will be noted. 








Objective





- Vital Signs


Vital signs: 


                                   Vital Signs











Temp  98.2 F   01/11/23 07:37


 


Pulse  74   01/11/23 07:37


 


Resp  16   01/11/23 07:37


 


BP  111/54   01/11/23 07:37


 


Pulse Ox  96   01/11/23 07:37


 


FiO2      








                                 Intake & Output











 01/10/23 01/11/23 01/11/23





 18:59 06:59 18:59


 


Output Total  550 


 


Balance  -550 


 


Output:   


 


  Urine  550 


 


Other:   


 


  # Voids  1 














- Labs


CBC & Chem 7: 


                                 01/11/23 15:18





                                 01/09/23 20:12

## 2023-01-11 NOTE — P.PN
Subjective


Progress Note Date: 01/11/23


Principal diagnosis: 


Right hip femoral neck fracture, nondisplaced





Patient was seen at bedside this morning lying semirecumbent position.  Patient 

says she is still having right hip/groin pain at this time.  Patient denies any 

new areas of pain.  Patient says she has not been up out of bed since coming the

hospital.  Patient says she was able discuss plans for surgery last night with 

some of her family members and they are in agreement with plan for surgery today

for right hip percutaneous screws.  Patient denies any new complaints.  Patient 

says she has not had anything to eat or drink today.  Patient denies chest pain,

fever, shortness of breath, nausea, vomiting, change in vision, loss of 

bowel/bladder control.








Objective





- Vital Signs


Vital signs: 


                                   Vital Signs











Temp  98.2 F   01/11/23 07:37


 


Pulse  74   01/11/23 07:37


 


Resp  16   01/11/23 07:37


 


BP  111/54   01/11/23 07:37


 


Pulse Ox  96   01/11/23 07:37


 


FiO2      








                                 Intake & Output











 01/10/23 01/11/23 01/11/23





 18:59 06:59 18:59


 


Output Total  550 


 


Balance  -550 


 


Output:   


 


  Urine  550 


 


Other:   


 


  # Voids  1 














- Exam


Negative for any open fractures, significant ecchymosis/erythema/ulcers.  

Sensation is equal, symmetric, bilaterally intact throughout the upper and lower

extremity is.  Patient does have some limited range of motion in the right hip 

in flexion/extension and right knee and flexion extension due to pain and 

weakness.  Patient has full range of motion in bilateral upper extremities and 

left lower extremity on exam.  Patient does have full range of motion and right 

ankle in dorsi/plantar flexion.  Motor exam - 4+/5 in all major motor groups in 

bilateral upper extremity is in left lower extremity on exam. 4+/5 and right 

ankle in resisted dorsiflexion/plantarflexion. 3/5 in resisted right hip flexion

extension and right knee flexion/extension.  Radial pulses intact, 2+.  Cap 

refill under 3 seconds in digits upper extremities.  DP pulse palpable 

bilaterally, weak.  Negative Homans bilaterally.








- Labs


CBC & Chem 7: 


                                 01/09/23 20:12





                                 01/09/23 20:12





Assessment and Plan


Assessment: 


1.  Right hip femoral neck fracture, nondisplaced





Plan: 


1.  Right hip femoral neck fracture, nondisplaced - right hip x-rays/CT does 

reveal femoral neck fracture that is nondisplaced.  Surgery is planned for 12 

today - right hip ORIF percutaneous screws.  Patient is remain nothing by mouth 

at this time.  Nonweightbearing right lower extremity.  We will continue to 

follow patient during her stay in hospital.





2.  Appreciate medical management





3.  Pain management - Norco





4.  DVT prophylaxis - withhold thinners at this time





5.  GI recs





6.  PT/OT - nonweightbearing right lower extremity





Time with Patient: Less than 30

## 2023-01-11 NOTE — XR
EXAMINATION TYPE: XR Hip Complete RT

 

DATE OF EXAM: 1/11/2023

 

COMPARISON: NONE

 

HISTORY: Hip fracture repair

 

TECHNIQUE: 7 intraoperative limited resolution views submitted.

 

FINDINGS:

There is postsurgical change in near anatomic alignment.  There is soft tissue edema and emphysema. 

 

IMPRESSION:

1. Postoperative change.  Appears in near-anatomic alignment.

## 2023-01-12 LAB
ANION GAP SERPL CALC-SCNC: 5.6 MMOL/L (ref 10–18)
BUN SERPL-SCNC: 8 MG/DL (ref 9–27)
BUN/CREAT SERPL: 11.9 RATIO (ref 12–20)
CALCIUM SPEC-MCNC: 8.4 MG/DL (ref 8.7–10.3)
CHLORIDE SERPL-SCNC: 105 MMOL/L (ref 96–109)
CO2 SERPL-SCNC: 25.9 MMOL/L (ref 20–27.5)
GLUCOSE SERPL-MCNC: 102 MG/DL (ref 70–110)
POTASSIUM SERPL-SCNC: 4.5 MMOL/L (ref 3.5–5.5)
SODIUM SERPL-SCNC: 137 MMOL/L (ref 135–145)

## 2023-01-12 RX ADMIN — SERTRALINE HYDROCHLORIDE SCH MG: 100 TABLET ORAL at 07:59

## 2023-01-12 RX ADMIN — APIXABAN SCH MG: 5 TABLET, FILM COATED ORAL at 14:37

## 2023-01-12 RX ADMIN — ASPIRIN SCH: 325 TABLET ORAL at 07:57

## 2023-01-12 RX ADMIN — DOCUSATE SODIUM AND SENNOSIDES SCH EACH: 50; 8.6 TABLET ORAL at 21:02

## 2023-01-12 RX ADMIN — MORPHINE SULFATE PRN MG: 4 INJECTION, SOLUTION INTRAMUSCULAR; INTRAVENOUS at 14:23

## 2023-01-12 RX ADMIN — ISOSORBIDE MONONITRATE SCH: 60 TABLET, EXTENDED RELEASE ORAL at 07:56

## 2023-01-12 RX ADMIN — MORPHINE SULFATE PRN MG: 4 INJECTION, SOLUTION INTRAMUSCULAR; INTRAVENOUS at 02:35

## 2023-01-12 RX ADMIN — CARBIDOPA AND LEVODOPA SCH EACH: 25; 100 TABLET ORAL at 07:59

## 2023-01-12 RX ADMIN — ATORVASTATIN CALCIUM SCH MG: 40 TABLET, FILM COATED ORAL at 21:02

## 2023-01-12 RX ADMIN — IPRATROPIUM BROMIDE AND ALBUTEROL SULFATE SCH ML: .5; 3 SOLUTION RESPIRATORY (INHALATION) at 20:26

## 2023-01-12 RX ADMIN — HYDROCODONE BITARTRATE AND ACETAMINOPHEN PRN EACH: 5; 325 TABLET ORAL at 21:03

## 2023-01-12 RX ADMIN — CARBIDOPA AND LEVODOPA SCH EACH: 25; 100 TABLET ORAL at 21:08

## 2023-01-12 RX ADMIN — APIXABAN SCH MG: 5 TABLET, FILM COATED ORAL at 21:08

## 2023-01-12 RX ADMIN — MORPHINE SULFATE PRN MG: 4 INJECTION, SOLUTION INTRAMUSCULAR; INTRAVENOUS at 08:27

## 2023-01-12 RX ADMIN — IPRATROPIUM BROMIDE AND ALBUTEROL SULFATE SCH ML: .5; 3 SOLUTION RESPIRATORY (INHALATION) at 07:46

## 2023-01-12 RX ADMIN — IPRATROPIUM BROMIDE AND ALBUTEROL SULFATE SCH ML: .5; 3 SOLUTION RESPIRATORY (INHALATION) at 11:14

## 2023-01-12 NOTE — P.PN
Subjective


Progress Note Date: 01/12/23


Principal diagnosis: 


Right hip femoral neck fracture, nondisplaced











Patient was seen at bedside this morning lying semirecumbent position with 

optifoam dressing to right hip.  Patient says she has not put any weight on her 

RLE since surgery was performed yesterday.  Patient says she is looking forward 

to working with physical therapy today.  Patient says she has urinated since 

surgery.  Patient says she has not had bowel movement yet, however, patient says

she has been passing gas.  Patient denies chest pain, fever, shortness breath, 

nausea, vomiting, change in vision, loss of bowel/bladder control.








Objective





- Vital Signs


Vital signs: 


                                   Vital Signs











Temp  97.9 F   01/12/23 07:57


 


Pulse  72   01/12/23 07:59


 


Resp  20   01/12/23 07:57


 


BP  100/60   01/12/23 07:57


 


Pulse Ox  91 L  01/12/23 07:57


 


FiO2      








                                 Intake & Output











 01/11/23 01/12/23 01/12/23





 18:59 06:59 18:59


 


Intake Total 1700  


 


Output Total 1160 1125 


 


Balance 540 -1125 


 


Weight 90 kg  


 


Intake:   


 


  IV 1050  


 


  Intake, IV Titration 650  





  Amount   


 


    Sodium Chloride 0.9% 1, 600  





    000 ml @ 75 mls/hr IV .   





    [id number] [facility] Rx#:[id number]   


 


    ceFAZolin 2 gm In Sodium 50  





    Chloride 0.9% 50 ml @ 100   





    mls/hr IVPB Q8H [facility] Rx#:   





    [id number]   


 


Output:   


 


  Urine 1150 1125 


 


    Uretheral ([place])  500 


 


  Estimated Blood Loss 10  


 


Other:   


 


  Voiding Method Indwelling Catheter Indwelling Catheter 














- Exam


Negative for any open fractures, significant ecchymosis/erythema/ulcers.  

optifoam drssing present over right hip at this time. incision appears to be 

healing well this time.  Minimal drainage.  Staples are well aligned and intact.

 Sensation is equal, symmetric, bilaterally intact throughout the upper and 

lower extremities.  Patient does have some limited range of motion in the right 

hip in flexion/extension and right knee and flexion extension due to pain and 

weakness.  Patient has full range of motion in bilateral upper extremities and 

left lower extremity on exam.  Patient does have full range of motion of right 

ankle in dorsi/plantar flexion.  Motor exam - 4+/5 in all major motor groups in 

bilateral upper extremities in left lower extremity on exam. 4+/5 and right 

ankle in resisted dorsiflexion/plantarflexion. 3+/5 in resisted right hip 

flexion extension and 4-/5 in resisted right knee flexion/extension.  Radial 

pulses intact, 2+.  Cap refill under 3 seconds in digits upper extremities.  DP 

pulse palpable bilaterally, weak.  Negative Homans bilaterally.








- Labs


CBC & Chem 7: 


                                 01/11/23 15:18





                                 01/12/23 07:08


Labs: 


                  Abnormal Lab Results - Last 24 Hours (Table)











  01/11/23 Range/Units





  15:18 


 


WBC  3.6 L  (3.8-10.6)  k/uL


 


RBC  3.29 L  (3.80-5.40)  m/uL


 


Hgb  10.3 L  (11.4-16.0)  gm/dL


 


Hct  32.6 L  (34.0-46.0)  %


 


Plt Count  147 L  (150-450)  k/uL


 


Lymphocytes #  0.3 L  (1.0-4.8)  k/uL














Assessment and Plan


Assessment: 


1.  Right hip femoral neck fracture, nondisplaced





Postoperative day #1 status post right hip percutaneous screws





Plan: 


1.  Right hip femoral neck fracture, nondisplaced - right hip x-rays/CT does 

reveal femoral neck fracture that is nondisplaced.  Surgery performed yesterday,

Wednesday, 01/11/2023 - right hip ORIF percutaneous screws.  Patient stable at 

bedside this morning.  Patient is to be 50% weightbearing right lower extremity.

 Pain medication as needed.  We'll continue to follow patient during her stay in

hospital.





2.  Appreciate medical management





3.  Pain management - Norco





4.  DVT prophylaxis - Eliquis 5 mg BID





5.  GI ppx - senna; milk of magnesia





6.  PT/OT - 50% weightbearing right lower extremity with walker





7.  Encourage incentive spirometer use





Time with Patient: Less than 30

## 2023-01-12 NOTE — P.OP
Date of Procedure: 23


Preoperative Diagnosis: 


1. Right femoral neck fracture, non-displaced, incomplete





2. s/p ffs multiple days prior with inability to ambulate


Postoperative Diagnosis: 


1. Right femoral neck fracture, non-displaced, incomplete





2. s/p ffs multiple days prior with inability to ambulate


Procedure(s) Performed: 


1. Open treatment femoral neck fracture with placement of cannulated screws 

(24224)


Implants: 


-6.0 cannulated screws olson and nephdione


Anesthesia: GETA


Surgeon: Oj Rousseau


Assistant #1: Chucky Seo (Was present and assisted with all aspects of the case

from positioniing to dressing placement)


Estimated Blood Loss (ml): 20


IV fluids (ml): 500


Urine output (ml): 100


Pathology: none sent


Condition: stable


Disposition: PACU


Indications for Procedure: 


                         Orthopedic Surgery Risk Review





                                         





Delphine Delgadillo is a 67-year-old female presenting for evaluation of sudden 

onset right hip pain, inability to ambulate after all from standing.   It was my

pleasure to have seen and examined Delphine Delgadillo.  





In our visit today we have had a chance to go over subjective complaints, 

physical examination findings and treatments including the natural course 

history without intervention and various interventional options.  Her imaging 

demonstrates femoral neck fracture nondisplaced incomplete. On physical exam, 

Delphine Delgadillo demonstrates pain with motion of right lower extremity, which 

is NV intact at this time. 





I have explained to the patient that this fracture needs stabilization. Based on

the patients imaging, physical exam, and the rapid progression and disabling 

nature of her symptoms, at this time I recommend surgery in the form or a: Open 

reduction internal fixation right hip with cannulated screws I discussed the 

risk and benefits of this procedure at length with Delphine Delgadillo and her 

family.   Questions were invited and answered, and the patient wishes to proceed

as outlined below.   





Currently, I am recommendin.   Over reduction internal fixation right hip with cannulated screws





2.    Review of surgical risks and benefits as well as an educational packet on 

the proposed surgical procedure. 





  





Risks:  





All surgical procedures come with inherent risks, including those related to 

positioning, anesthesia, intraoperative findings, and postoperative 

complications.  It is important to understand that surgery does not come with 

any guarantee of a successful outcome as complications and adverse events are 

always possible.    





The patient was given a handout discussing the surgical procedure and risks 

associated with the intervention, both of which were discussed with the patient.

 These risks include but are not limited to the following: 





-      Experiencing same, different or even worse symptoms compared to before 

surgery. 





-      Requiring further surgery or other forms of treatment presently or at 

some time in the future . 





-      On an extreme but fortunately relatively rare basis severe complication 

such as blindness, stroke, heart attack, temporary and/or permanent nerve 

injury, paralysis, coma, or death may occur, sometimes without known 

explanation. 





-      Surgical complications may include but are not limited to risk of 

infection, fluid accumulation in the surgical dissection site, including a 

seroma or hematoma, that requires additional surgery, wound drainage, bleeding, 

new numbness or weakness, vision changes/loss, spinal fluid leakage, non-healing

and/or infected incision, headaches, difficulty or inability to swallow, 

hoarseness, hemopneumothorax, pneumothorax,  injury to nerves, spinal cord, 

blood vessels, lymphatics or other vital organs (i.e., bowel injury, injury to 

the great vessels); heterotopic bone formation; complications related to the 

hardware such as screws, rods,  including misplaced hardware, device failure, 

hardware fracture/breakage, or hardware loosening;  retained surgical 

instrumentations or devices and the need for further surgery.     





-      Medical risks of the planned surgery include but are not limited to gene

ralized Infections to the whole body or local areas outside of the surgical site

(sepsis), heart attack, bleeding, anaphylaxis, meningitis, seizure, epilepsy, 

hearing loss, burn marks, laceration of the head or other areas of the body, 

bruising, hypersensitivity of the skin, bladder over distension; allergic 

reaction; shoulder injury related to positioning; fat, blood and air clots to 

other areas of the body like heart, lungs, brain; failure of internal organs 

such as lungs, kidneys, liver and excessive bleeding. If blood transfusions are 

necessary, note that transfusions may cause intolerance reactions such as 

anaphylaxis or other complex reactions. 





  Despite best efforts, the results of surgery might not heal in terms of bone, 

soft tissues such as skin, fascia, ligaments, and joints.   





[facility] has multiple operating rooms with single and overlapping 

rooms running daily.  They currently function under the required guidelines as 

produced by the [employer] Committee with regards to the overlapping rooms 

and will continue to comply with changes to this policy as they occur.  The 

requirements include and are complied with as follows: (1) the critical portions

of the overlapping rooms will not occur at the same time, (2) the attending 

physician will be physically present during the critical portions of the 

procedure and immediately available during the entire case, and (3) a back-up 

attending is designated should the primary attending not be immediately 

available. 





The patient has had a chance to review all the listed information, has been 

given print outs detailing this information, and has had all his/her questions 

answered to their satisfaction. 





It was my pleasure to have seen and examined Delphine Delgadillo. In our visit toda

y we have had a chance to go over my understanding of our patient's current 

condition, the natural course history without intervention and various 

interventional options. Questions were invited and answered, and the patient 

wishes to proceed as outlined above. I have seen and examined the patient for 25

minutes and we have spent more than 50% of the time in repeat and detailed 

counseling about the patient's condition, its natural course history with out 

and as much as can be predicted with surgery and re-review of various surgical 

treatment options. 





  





In conclusion, Delphine Delgadillo and her family  requested we proceed with the 

above suggested surgery and are willing to accept risks and limitations of the 

suggested surgery as nature of the disease process and our best attempts at 

treatment for the condition. 





Thank you again for allowing us to be part of your patient's care. Please don't 

hesitate to contact me if you have any further questions.    





  





Signed and authenticated by:       





  





[facility] 





Complex and Minimally Invasive Spine Surgery 





[address] [place] [id number] 





[phone]  





Fax: [phone] 





 


Description of Procedure: 


The patient was seen and examined in the preoperative area.  All preoperative 

protocols were followed.  Informed consent was obtained risks and benefits of 

the procedure were discussed at length.  Risks including bleeding infection 

damage to the surrounding tissue and risk of reoperation were discussed with the

patient.  Risk of anesthesia up to and including death was a discussed with the 

patient.  These are outlined in the risk reviewed.  They were willing to accept 

these risks and all of the risks of surgery.  The patient was given a weight-

based dose of antibiotics in the form of 2 g Ancef.  The patient was seen and 

evaluated by the anesthesia team who deemed them fit for surgery. The site was 

marked, the patient was willing to proceed with the procedure. 





 The patient was transferred to the operative suite by the Department of 

anesthesia.  There were then drifted off to sleep by the department of 

anesthesia andGETA anesthesia was used.  Once adequate anesthesia had been 

obtained the patient was carefully transferred to the operative bed.  All bony 

prominences were padded accordingly.  SCDs were placed on the nonoperative lower

extremities.  Arms were well padded.  





the patient was transferred to the Shaina table and her right leg was placed in a

Shaina boot and secured to the table left leg was placed in a well-leg cuevas 

well padded and secured.  The post was placed and she was secured appropriately.

 arms were placed on arm boards and well-padded





Preoperative briefing was done with the operative team and everyone was ready 

for the procedure to start.  The patients right leg was then prepped and draped

in the normal sterile fashion.  Timeout was then performed and all parties in 

agreement with the procedure to be performed. 





X-ray was then used to janet of the femoral neck as well as the femoral shaft.  

Skin incision was made over the lateral aspect the patient's hip dissection 

taken down bluntly to the tensor fascia which was split in line with its fibers.

 A pin was then used under fluoroscopic guidance and placed center  within the 

femoral neck on the lowest portion of the calcar possible.  This pin was then 

advanced under AP and lateral fluoroscopy to be within 5 mm of the subchondral 

cortex.  The parallel guide was then used to place 2 more pins in an inverted 

triangle configuration the anterior superior pin in the posterior superior pins 

were placed.  This is done under AP and lateral fluoroscopy.  We then measured 

each pin after measurement the lateral cortex was opened with a drill over the 

pin followed by a 6.0 mm cannulated screw which was partially threaded.  The 

screw was first passed over the lowest calcar pin and had good purchase.  We 

then placed the posterior superior than the anterior superior screws.  All 

screws had good purchase and were in good position on AP and lateral within the 

femoral neck crossing the fracture and creating good compression.  The pins were

then all removed.  The wound was then copiously irrigated with normal sterile 

saline final AP and lateral fluoroscopic imaging confirmed good placement of 

pins as well as reduction of fracture.  The deep fascia was then closed with 0 

Vicryl superficial closed 2-0 Vicryl and skin closed with skin staples the wound

edges approximated very well.  The wound was then cleaned and dressed with an 

optifoam dressing.





 


The patient was then transferred back to their hospital bed.  There were 

awakened by department of anesthesia having tolerated the procedure very well 

with no complications.  The patient was then transported to the postoperative 

care unit in stable condition.

## 2023-01-12 NOTE — P.PN
Subjective


Progress Note Date: 01/12/23




















This is a 67-year-old female who presented to the emergency department via EMS 

with concerns of right-sided groin and hip pain.  Patient reports she has been 

having worsening pain of the hip area over the last 2 weeks.  Patient denies any

injury or falls most recently.  Patient reports she follows with Dr. Marks in 

the outpatient setting with a past medical history of atrial fibrillation, COPD,

hypertension, anxiety/depression and denies any illicit drug use or alcohol and 

reports to never smoking.  Patient reports approximately 2 years ago she had her

left hip repaired as she broke the femur head off her left leg requiring 

intervention.  Patient reports she has been compensating since the surgery and 

does have some pain that she reports is chronic.  Patient does have a walker and

a cane at the home.  Patient had CT angiograph due to her diminished pulses and 

left leg pain showing some diminished distal right anterior tibial artery flow 

at the ankle likely related to hemodynamic stenosis otherwise negative along 

with some bilateral lower lobe infiltrates and atelectasis of what she does have

chronic COPD and wears oxygen outpatient.  Orthopedics was consulted and a left 

hip x-ray was done with highly suspicious acute minimally displaced fracture of 

the right femoral neck.  Pulmonary patient reviewed and resumed and will 

continue with heparin subcu and hold eliquis in the event of surgical 

intervention which is tentatively scheduled for tomorrow.  Repeat CT of the hip 

is ordered and pending.  Labs reviewed and within normal limits from ER 

admission.





01/11/2023








Patient is seen and evaluated in follow-up this morning no acute overnight 

issues noted.  Patient continues to have right hip pain with orthopedics 

following and plan is for ORIF with nailing or pinning of the fracture.  Patient

is currently nothing by mouth and will remain until post-surgery.  Will resume 

home medications and continue to monitor closely.  Will await surgical report.  

Patient is afebrile and medically stable for surgery today. 





01/12/2023


Patient is seen in follow-up this morning postop percutaneous screws placed to 

the right hip for fracture.  Orthopedics following and will discuss further 

about resuming anticoagulant.  Patient does have history of atrial fibrillation.

 Patient also with chronic oxygen use of 4 L currently maintained on 5 L.  

Patient is reporting some shortness of breath and will include incentive 

spirometer and encourage the patient to use at least 10 times every hour while 

awake.  Patient to work with physical therapy today and have discussed possible 

ECF if patient continues with weakness when stabilized.  Recommend to hold 

lisinopril as blood pressures have been on the lower side.  Will resume Lasix in

the a.m.











Review of systems:


Constitutional: No reports of fatigue, fever, or chills


Cardiovascular: No reports of chest pain or palpitations


Respiratory: No reports of shortness of breath or cough


GI: No reports of nausea, vomiting, or diarrhea, reports passing gas with no 

bowel movement


: No reports of dysuria or retention


Neurovascular:  reports of weakness and continued right hip pain





All medications have been reviewed














PHYSICAL EXAMINATION: 





GENERAL: The patient is alert and oriented x4,  Well developed, well nourished. 

Obese.


HEENT: Pupils are round and equally reacting to light. EOMI. no scleral icterus.

No conjunctival pallor. Normocephalic, atraumatic. No pharyngeal erythema. No 

thyromegaly.  


CARDIOVASCULAR: S1 and S2  muffled 


PULMONARY: diminished breath sounds bilaterally with no wheezing or rhonchi 

noted. 


ABDOMEN: soft.  Nontender on exam.  obese. non-distended, normoactive bowel 

sounds. No palpable organomegaly. 


MUSCULOSKELETAL: No joint swelling or deformity.


EXTREMITIES: No cyanosis, clubbing, or pedal edema.  right hip pain on palpation


NEUROLOGICAL: Gross neurological examination did not reveal any focal deficits. 

Diffuse weakness


SKIN: No rashes. 





Assessment:





Right hip pain with inability to ambulate, secondary to minimally displaced 

fracture of the right femoral neck, status post right hip percutaneous screws


History of atrial fibrillation


History of COPD, not an exacerbation


Chronic hypoxic respiratory failure secondary to COPD, wears 2-3 L via nasal 

cannula outpatient


History of hypertension


History of anxiety/depression


GI prophylaxis


DVT prophylaxis


Full code





Plan:





Recommend to continue with current medications and management and orthopedics 

following and underwent right hip percutaneous screws yesterday


Home medications have been reviewed and resumed and will hold eliquis for now 

and continue subcutaneous heparin every 12 as patient is going for surgery 

today. Will discuss with ortho when ok to resume oral Eliquis. 


Encouraged oral intake


Recommend  incentive spirometer and encourage the patient use at least 10 times 

every hour while awake


Labs reviewed and within normal limits today.  Blood pressure on the lower side 

we'll continue to hold lisinopril and will possibly add Lasix tomorrow


PT/OT evaluated the patient post surgery and recommending rehab and patient is 

agreeable if her insurance will cover it.  Case management following and has 

submitted for authorization


Patient with history of COPD and is maintained currently on 5 L although 

normally wears 4 L outpatient oxygen saturations are 97% and will continue to 

monitor.  Will add breathing treatments and follow-up with patient in the a.m.


Possible discharge in the next 24-48 hours














The impression and plan of care has been dictated by Myrtle Hyman, nurse 

practitioner as directed.





Dr. Chris MD


I have performed a history and examination and MDM of this patient, discussed 

the same with the dictator, and  agree with the dictator's assessment and plan 

as written ,documented as a scribe. Based on total visit time,  I have performed

more than 50% of the visit. Any additional findings or plans will be noted. 








Objective





- Vital Signs


Vital signs: 


                                   Vital Signs











Temp  97.9 F   01/12/23 13:40


 


Pulse  81   01/12/23 15:12


 


Resp  20   01/12/23 13:40


 


BP  145/64   01/12/23 15:12


 


Pulse Ox  96   01/12/23 13:40


 


FiO2      








                                 Intake & Output











 01/11/23 01/12/23 01/12/23





 18:59 06:59 18:59


 


Intake Total 1700  350


 


Output Total 1160 1125 1600


 


Balance 540 -1125 -1250


 


Weight 90 kg  


 


Intake:   


 


  IV 1050  


 


  Intake, IV Titration 650  350





  Amount   


 


    Sodium Chloride 0.9% 1, 600  300





    000 ml @ 75 mls/hr IV .   





    [id number] [facility] Rx#:[id number]   


 


    ceFAZolin 2 gm In Sodium 50  50





    Chloride 0.9% 50 ml @ 100   





    mls/hr IVPB Q8H [facility] Rx#:   





    [id number]   


 


Output:   


 


  Urine 1150 1125 1600


 


    Uretheral ([place])  500 1200


 


  Estimated Blood Loss 10  


 


Other:   


 


  Voiding Method Indwelling Catheter Indwelling Catheter Indwelling Catheter














- Labs


CBC & Chem 7: 


                                 01/11/23 15:18





                                 01/12/23 07:08


Labs: 


                  Abnormal Lab Results - Last 24 Hours (Table)











  01/12/23 Range/Units





  07:08 


 


Anion Gap  5.60 L  (10.00-18.00)  mmol/L


 


BUN  8.0 L  (9.0-27.0)  mg/dL


 


BUN/Creatinine Ratio  11.90 L  (12.00-20.00)  Ratio


 


Calcium  8.4 L  (8.7-10.3)  mg/dL

## 2023-01-13 LAB
BASOPHILS # BLD AUTO: 0.01 X 10*3/UL (ref 0–0.1)
BASOPHILS NFR BLD AUTO: 0.2 %
EOSINOPHIL # BLD AUTO: 0.15 X 10*3/UL (ref 0.04–0.35)
EOSINOPHIL NFR BLD AUTO: 3.4 %
ERYTHROCYTE [DISTWIDTH] IN BLOOD BY AUTOMATED COUNT: 3.14 X 10*6/UL (ref 4.1–5.2)
ERYTHROCYTE [DISTWIDTH] IN BLOOD: 13.3 % (ref 11.5–14.5)
HCT VFR BLD AUTO: 32.8 % (ref 37.2–46.3)
HGB BLD-MCNC: 9.9 G/DL (ref 12–15)
IMM GRANULOCYTES BLD QL AUTO: 0.4 %
LYMPHOCYTES # SPEC AUTO: 0.6 X 10*3/UL (ref 0.9–5)
LYMPHOCYTES NFR SPEC AUTO: 13.5 %
MCH RBC QN AUTO: 31.5 PG (ref 27–32)
MCHC RBC AUTO-ENTMCNC: 30.2 G/DL (ref 32–37)
MCV RBC AUTO: 104.5 FL (ref 80–97)
MONOCYTES # BLD AUTO: 0.36 X 10*3/UL (ref 0.2–1)
MONOCYTES NFR BLD AUTO: 8.1 %
NEUTROPHILS # BLD AUTO: 3.32 X 10*3/UL (ref 1.8–7.7)
NEUTROPHILS NFR BLD AUTO: 74.4 %
NRBC BLD AUTO-RTO: 0 /100 WBCS (ref 0–0)
PLATELET # BLD AUTO: 160 X 10*3/UL (ref 140–440)
WBC # BLD AUTO: 4.46 X 10*3/UL (ref 4.5–10)

## 2023-01-13 RX ADMIN — DOCUSATE SODIUM AND SENNOSIDES SCH EACH: 50; 8.6 TABLET ORAL at 20:18

## 2023-01-13 RX ADMIN — ATORVASTATIN CALCIUM SCH MG: 40 TABLET, FILM COATED ORAL at 20:18

## 2023-01-13 RX ADMIN — IPRATROPIUM BROMIDE AND ALBUTEROL SULFATE SCH ML: .5; 3 SOLUTION RESPIRATORY (INHALATION) at 12:41

## 2023-01-13 RX ADMIN — IPRATROPIUM BROMIDE AND ALBUTEROL SULFATE SCH ML: .5; 3 SOLUTION RESPIRATORY (INHALATION) at 08:24

## 2023-01-13 RX ADMIN — APIXABAN SCH MG: 5 TABLET, FILM COATED ORAL at 20:18

## 2023-01-13 RX ADMIN — HYDROCODONE BITARTRATE AND ACETAMINOPHEN PRN EACH: 5; 325 TABLET ORAL at 16:56

## 2023-01-13 RX ADMIN — CARBIDOPA AND LEVODOPA SCH EACH: 25; 100 TABLET ORAL at 09:41

## 2023-01-13 RX ADMIN — ASPIRIN SCH: 325 TABLET ORAL at 09:44

## 2023-01-13 RX ADMIN — ISOSORBIDE MONONITRATE SCH MG: 60 TABLET, EXTENDED RELEASE ORAL at 09:44

## 2023-01-13 RX ADMIN — HYDROCODONE BITARTRATE AND ACETAMINOPHEN PRN EACH: 5; 325 TABLET ORAL at 09:42

## 2023-01-13 RX ADMIN — FUROSEMIDE SCH MG: 40 TABLET ORAL at 09:44

## 2023-01-13 RX ADMIN — CARBIDOPA AND LEVODOPA SCH EACH: 25; 100 TABLET ORAL at 20:18

## 2023-01-13 RX ADMIN — SERTRALINE HYDROCHLORIDE SCH MG: 100 TABLET ORAL at 09:41

## 2023-01-13 RX ADMIN — APIXABAN SCH MG: 5 TABLET, FILM COATED ORAL at 09:44

## 2023-01-13 RX ADMIN — IPRATROPIUM BROMIDE AND ALBUTEROL SULFATE SCH ML: .5; 3 SOLUTION RESPIRATORY (INHALATION) at 19:28

## 2023-01-13 NOTE — P.PN
Subjective


Progress Note Date: 01/13/23


Principal diagnosis: 


Right hip femoral neck fracture, nondisplaced











Patient was seen at bedside this morning lying semirecumbent position with 

optifoam dressing to right hip.  Patient says she attempted to work with 

physical therapy yesterday and was only able to sit up at bedside.  Patient says

she is not able to stand up and bear any weight on her right lower extremity 

yesterday.  Patient says she is limited in forward to physical therapy today and

we'll attempt to stand up at bedside today. Patient says she has urinated since 

surgery.  Patient says she has not had bowel movement yet, however, patient says

she has been passing gas.  Patient denies chest pain, fever, shortness breath, 

nausea, vomiting, change in vision, loss of bowel/bladder control.








Objective





- Vital Signs


Vital signs: 


                                   Vital Signs











Temp  97.7 F   01/13/23 06:43


 


Pulse  72   01/13/23 08:36


 


Resp  16   01/13/23 06:43


 


BP  122/70   01/13/23 06:43


 


Pulse Ox  95   01/13/23 06:43


 


FiO2      








                                 Intake & Output











 01/12/23 01/13/23 01/13/23





 18:59 06:59 18:59


 


Intake Total 350  


 


Output Total 1600 1500 


 


Balance -1250 -1500 


 


Intake:   


 


  Intake, IV Titration 350  





  Amount   


 


    Sodium Chloride 0.9% 1, 300  





    000 ml @ 75 mls/hr IV .   





    [id number] [facility] Rx#:[id number]   


 


    ceFAZolin 2 gm In Sodium 50  





    Chloride 0.9% 50 ml @ 100   





    mls/hr IVPB Q8H [facility] Rx#:   





    [id number]   


 


Output:   


 


  Urine 1600 1500 


 


    Uretheral ([place]) 1200  


 


Other:   


 


  Voiding Method Indwelling Catheter  














- Exam


Negative for any open fractures, significant ecchymosis/erythema/ulcers.  

optifoam dressing present over right hip at this time. incision appears to be 

healing well this time.  Minimal drainage.  Staples are well aligned and intact.

 Sensation is equal, symmetric, bilaterally intact throughout the upper and 

lower extremities.  Patient does have some limited range of motion in the right 

hip in flexion/extension and right knee and flexion extension due to pain and 

weakness.  Patient has full range of motion in bilateral upper extremities and 

left lower extremity on exam.  Patient does have full range of motion of right 

ankle in dorsi/plantar flexion.  Motor exam - 4+/5 in all major motor groups in 

bilateral upper extremities in left lower extremity on exam. 4+/5 and right 

ankle in resisted dorsiflexion/plantarflexion. 3+/5 in resisted right hip flexi

on extension and 4-/5 in resisted right knee flexion/extension.  Radial pulses 

intact, 2+.  Cap refill under 3 seconds in digits upper extremities.  DP pulse 

palpable bilaterally, weak.  Negative Homans bilaterally.








- Labs


CBC & Chem 7: 


                                 01/11/23 15:18





                                 01/12/23 07:08


Labs: 


                  Abnormal Lab Results - Last 24 Hours (Table)











  01/12/23 Range/Units





  07:08 


 


Anion Gap  5.60 L  (10.00-18.00)  mmol/L


 


BUN  8.0 L  (9.0-27.0)  mg/dL


 


BUN/Creatinine Ratio  11.90 L  (12.00-20.00)  Ratio


 


Calcium  8.4 L  (8.7-10.3)  mg/dL














Assessment and Plan


Assessment: 


1.  Right hip femoral neck fracture, nondisplaced





Postoperative day #2 status post right hip ORIF percutaneous screws





Plan: 


1.  Right hip femoral neck fracture, nondisplaced - right hip x-rays/CT does 

reveal femoral neck fracture that is nondisplaced.  Surgery performed Wednesday,

01/11/2023 - right hip ORIF percutaneous screws.  Patient stable at bedside this

morning.  Patient is to be 50% weightbearing right lower extremity.  Pain 

medication as needed.  Patient is stable from orthopedic standpoint for 

discharge to rehab. Planning for discharge to [facility] today pending auth





2.  Appreciate medical management - discharge per medicine





3.  Pain management - Norco





4.  DVT prophylaxis - Eliquis 5 mg BID





5.  GI ppx - senna; milk of magnesia





6.  PT/OT - 50% weightbearing right lower extremity with walker





7.  Encourage incentive spirometer use





Time with Patient: Less than 30

## 2023-01-13 NOTE — P.PN
Subjective


Progress Note Date: 01/13/23




















This is a 67-year-old female who presented to the emergency department via EMS 

with concerns of right-sided groin and hip pain.  Patient reports she has been 

having worsening pain of the hip area over the last 2 weeks.  Patient denies any

injury or falls most recently.  Patient reports she follows with Dr. Marks in 

the outpatient setting with a past medical history of atrial fibrillation, COPD,

hypertension, anxiety/depression and denies any illicit drug use or alcohol and 

reports to never smoking.  Patient reports approximately 2 years ago she had her

left hip repaired as she broke the femur head off her left leg requiring 

intervention.  Patient reports she has been compensating since the surgery and 

does have some pain that she reports is chronic.  Patient does have a walker and

a cane at the home.  Patient had CT angiograph due to her diminished pulses and 

left leg pain showing some diminished distal right anterior tibial artery flow 

at the ankle likely related to hemodynamic stenosis otherwise negative along 

with some bilateral lower lobe infiltrates and atelectasis of what she does have

chronic COPD and wears oxygen outpatient.  Orthopedics was consulted and a left 

hip x-ray was done with highly suspicious acute minimally displaced fracture of 

the right femoral neck.  Pulmonary patient reviewed and resumed and will 

continue with heparin subcu and hold eliquis in the event of surgical 

intervention which is tentatively scheduled for tomorrow.  Repeat CT of the hip 

is ordered and pending.  Labs reviewed and within normal limits from ER 

admission.





01/11/2023








Patient is seen and evaluated in follow-up this morning no acute overnight 

issues noted.  Patient continues to have right hip pain with orthopedics 

following and plan is for ORIF with nailing or pinning of the fracture.  Patient

is currently nothing by mouth and will remain until post-surgery.  Will resume 

home medications and continue to monitor closely.  Will await surgical report.  

Patient is afebrile and medically stable for surgery today. 





01/12/2023


Patient is seen in follow-up this morning postop percutaneous screws placed to 

the right hip for fracture.  Orthopedics following and will discuss further 

about resuming anticoagulant.  Patient does have history of atrial fibrillation.

 Patient also with chronic oxygen use of 4 L currently maintained on 5 L.  

Patient is reporting some shortness of breath and will include incentive 

spirometer and encourage the patient to use at least 10 times every hour while 

awake.  Patient to work with physical therapy today and have discussed possible 

ECF if patient continues with weakness when stabilized.  Recommend to hold 

lisinopril as blood pressures have been on the lower side.  Will resume Lasix in

the a.m.





01/13/2023





Patient is seen in follow-up today currently working with physical therapy 

getting up to the chair.  Patient reports continued pain in the right hip 

although somewhat improved.  Patient denies chest pain or worsening shortness of

breath.  Patient is currently on 3 L chronically wears 4 L in the outpatient 

setting.  Patient with incentive spirometer and encourage the patient to 

continue using at least 10 times every hour while awake.  Case management 

following as well as patient will require insurance authorization which is 

currently pending.  Orthopedics following and has cleared the patient for 

discharge to ECF once insurance authorization is obtained.








Review of systems:


Constitutional: No reports of fatigue, fever, or chills


Cardiovascular: No reports of chest pain or palpitations


Respiratory: No reports of shortness of breath or cough


GI: No reports of nausea, vomiting, or diarrhea, reports passing gas with no 

bowel movement


: No reports of dysuria or retention


Neurovascular:  reports of weakness and continued right hip pain





All medications have been reviewed














PHYSICAL EXAMINATION: 





GENERAL: The patient is alert and oriented x4,  Well developed, well nourished. 

Obese.


HEENT: Pupils are round and equally reacting to light. EOMI. no scleral icterus.

No conjunctival pallor. Normocephalic, atraumatic. No pharyngeal erythema. No 

thyromegaly.  


CARDIOVASCULAR: S1 and S2  muffled 


PULMONARY: diminished breath sounds bilaterally with no wheezing or rhonchi 

noted. 


ABDOMEN: soft.  Nontender on exam.  obese. non-distended, normoactive bowel 

sounds. No palpable organomegaly. 


MUSCULOSKELETAL: No joint swelling or deformity.


EXTREMITIES: No cyanosis, clubbing, or pedal edema.  right hip pain on palpation


NEUROLOGICAL: Gross neurological examination did not reveal any focal deficits. 

Diffuse weakness


SKIN: No rashes. 





Assessment:





Right hip pain with inability to ambulate, secondary to minimally displaced 

fracture of the right femoral neck, status post right hip percutaneous screws


History of atrial fibrillation


History of COPD, not an exacerbation


Chronic hypoxic respiratory failure secondary to COPD, wears 4 L via nasal 

cannula outpatient


History of hypertension


History of anxiety/depression


GI prophylaxis


DVT prophylaxis


Full code





Plan:





Recommend to continue with current medications and management and orthopedics 

following and underwent right hip percutaneous screws, postop day 2.  

Orthopedics has cleared the patient for discharge to [facility] with outpatient follow-

up


Home medications have been reviewed and resumed eliquis 


Encouraged oral intake


Recommend  incentive spirometer and encourage the patient use at least 10 times 

every hour while awake


PT/OT evaluated the patient post surgery and recommending rehab and patient is 

agreeable if her insurance will cover it.  Case management following and has 

submitted for authorization.  Apparently there were some issues with the in

correct information on the insurance and authorization was just admitted today 

awaiting further documentation for the insurance.  Case management and liaison 

from [facility] working on this.


Patient is medically stable and ready for discharge once insurance authorization

is obtained for patient to go to rehab for continued strength and mobility





The impression and plan of care has been dictated by Myrtle Hyman, nurse 

practitioner as directed.





Dr. Chris MD


I have performed a history and examination and MDM of this patient, discussed 

the same with the dictator, and  agree with the dictator's assessment and plan 

as written ,documented as a scribe. Based on total visit time,  I have performed

more than 50% of the visit. Any additional findings or plans will be noted. 








Objective





- Vital Signs


Vital signs: 


                                   Vital Signs











Temp  97.7 F   01/13/23 06:43


 


Pulse  80   01/13/23 12:52


 


Resp  16   01/13/23 06:43


 


BP  122/70   01/13/23 06:43


 


Pulse Ox  95   01/13/23 06:43


 


FiO2      








                                 Intake & Output











 01/12/23 01/13/23 01/13/23





 18:59 06:59 18:59


 


Intake Total 350  


 


Output Total 1600 1500 


 


Balance -1250 -1500 


 


Intake:   


 


  Intake, IV Titration 350  





  Amount   


 


    Sodium Chloride 0.9% 1, 300  





    000 ml @ 75 mls/hr IV .   





    [id number] MIKHAIL Rx#:[id number]   


 


    ceFAZolin 2 gm In Sodium 50  





    Chloride 0.9% 50 ml @ 100   





    mls/hr IVPB Q8H MIKHAIL Rx#:   





    [id number]   


 


Output:   


 


  Urine 1600 1500 


 


    Uretheral ([place]) 1200  


 


Other:   


 


  Voiding Method Indwelling Catheter  














- Labs


CBC & Chem 7: 


                                 01/13/23 05:46





                                 01/12/23 07:08


Labs: 


                  Abnormal Lab Results - Last 24 Hours (Table)











  01/13/23 Range/Units





  05:46 


 


WBC  4.46 L  (4.50-10.00)  X 10*3/uL


 


RBC  3.14 L  (4.10-5.20)  X 10*6/uL


 


Hgb  9.9 L  (12.0-15.0)  g/dL


 


Hct  32.8 L  (37.2-46.3)  %


 


MCV  104.5 H  (80.0-97.0)  fL


 


MCHC  30.2 L  (32.0-37.0)  g/dL


 


Lymphocytes #  0.60 L  (0.90-5.00)  X 10*3/uL

## 2023-01-14 RX ADMIN — IPRATROPIUM BROMIDE AND ALBUTEROL SULFATE SCH: .5; 3 SOLUTION RESPIRATORY (INHALATION) at 12:01

## 2023-01-14 RX ADMIN — IPRATROPIUM BROMIDE AND ALBUTEROL SULFATE SCH ML: .5; 3 SOLUTION RESPIRATORY (INHALATION) at 20:08

## 2023-01-14 RX ADMIN — IPRATROPIUM BROMIDE AND ALBUTEROL SULFATE SCH ML: .5; 3 SOLUTION RESPIRATORY (INHALATION) at 08:15

## 2023-01-14 RX ADMIN — APIXABAN SCH MG: 5 TABLET, FILM COATED ORAL at 08:06

## 2023-01-14 RX ADMIN — ATORVASTATIN CALCIUM SCH MG: 40 TABLET, FILM COATED ORAL at 20:08

## 2023-01-14 RX ADMIN — HYDROCODONE BITARTRATE AND ACETAMINOPHEN PRN EACH: 5; 325 TABLET ORAL at 21:11

## 2023-01-14 RX ADMIN — ISOSORBIDE MONONITRATE SCH MG: 60 TABLET, EXTENDED RELEASE ORAL at 08:06

## 2023-01-14 RX ADMIN — FUROSEMIDE SCH MG: 40 TABLET ORAL at 08:06

## 2023-01-14 RX ADMIN — APIXABAN SCH MG: 5 TABLET, FILM COATED ORAL at 20:08

## 2023-01-14 RX ADMIN — CARBIDOPA AND LEVODOPA SCH EACH: 25; 100 TABLET ORAL at 08:06

## 2023-01-14 RX ADMIN — SERTRALINE HYDROCHLORIDE SCH MG: 100 TABLET ORAL at 08:06

## 2023-01-14 RX ADMIN — HYDROCODONE BITARTRATE AND ACETAMINOPHEN PRN EACH: 5; 325 TABLET ORAL at 08:47

## 2023-01-14 RX ADMIN — CARBIDOPA AND LEVODOPA SCH EACH: 25; 100 TABLET ORAL at 20:08

## 2023-01-14 RX ADMIN — DOCUSATE SODIUM AND SENNOSIDES SCH EACH: 50; 8.6 TABLET ORAL at 20:08

## 2023-01-14 RX ADMIN — ASPIRIN SCH: 325 TABLET ORAL at 08:06

## 2023-01-14 NOTE — P.PN
Subjective


Progress Note Date: 01/14/23








This is a 67-year-old female who presented to the emergency department via EMS 

with concerns of right-sided groin and hip pain.  Patient reports she has been 

having worsening pain of the hip area over the last 2 weeks.  Patient denies any

injury or falls most recently.  Patient reports she follows with Dr. Marks in 

the outpatient setting with a past medical history of atrial fibrillation, COPD,

hypertension, anxiety/depression and denies any illicit drug use or alcohol and 

reports to never smoking.  Patient reports approximately 2 years ago she had her

left hip repaired as she broke the femur head off her left leg requiring 

intervention.  Patient reports she has been compensating since the surgery and 

does have some pain that she reports is chronic.  Patient does have a walker and

a cane at the home.  Patient had CT angiograph due to her diminished pulses and 

left leg pain showing some diminished distal right anterior tibial artery flow 

at the ankle likely related to hemodynamic stenosis otherwise negative along 

with some bilateral lower lobe infiltrates and atelectasis of what she does have

chronic COPD and wears oxygen outpatient.  Orthopedics was consulted and a left 

hip x-ray was done with highly suspicious acute minimally displaced fracture of 

the right femoral neck.  Pulmonary patient reviewed and resumed and will 

continue with heparin subcu and hold eliquis in the event of surgical 

intervention which is tentatively scheduled for tomorrow.  Repeat CT of the hip 

is ordered and pending.  Labs reviewed and within normal limits from ER 

admission.





01/11/2023








Patient is seen and evaluated in follow-up this morning no acute overnight 

issues noted.  Patient continues to have right hip pain with orthopedics 

following and plan is for ORIF with nailing or pinning of the fracture.  Patient

is currently nothing by mouth and will remain until post-surgery.  Will resume 

home medications and continue to monitor closely.  Will await surgical report.  

Patient is afebrile and medically stable for surgery today. 





01/12/2023


Patient is seen in follow-up this morning postop percutaneous screws placed to 

the right hip for fracture.  Orthopedics following and will discuss further 

about resuming anticoagulant.  Patient does have history of atrial fibrillation.

 Patient also with chronic oxygen use of 4 L currently maintained on 5 L.  

Patient is reporting some shortness of breath and will include incentive 

spirometer and encourage the patient to use at least 10 times every hour while 

awake.  Patient to work with physical therapy today and have discussed possible 

ECF if patient continues with weakness when stabilized.  Recommend to hold 

lisinopril as blood pressures have been on the lower side.  Will resume Lasix in

the a.m.





01/13/2023





Patient is seen in follow-up today currently working with physical therapy 

getting up to the chair.  Patient reports continued pain in the right hip 

although somewhat improved.  Patient denies chest pain or worsening shortness of

breath.  Patient is currently on 3 L chronically wears 4 L in the outpatient 

setting.  Patient with incentive spirometer and encourage the patient to 

continue using at least 10 times every hour while awake.  Case management 

following as well as patient will require insurance authorization which is cur

rently pending.  Orthopedics following and has cleared the patient for discharge

to ECF once insurance authorization is obtained.





01/14/2023


Patient is postoperative day #2 right hip percutaneous screws. Up in chair. 

Reports pain currently 4/10 on oral pain medication. Currently on 3 to 4 L of 

oxygen and wears 4L chronically no shortness of breath reported. Using incentive

spirometer. Most recent labs showing white count of 4.46, hg 9.9, sodium 137, 

potassium 4.5, BUN 8, creatinine 0.7. Remains afebrile. No bowel movement, 

patient is receiving senakot, would recommend for suppository if needed. 








Review of systems:


Constitutional: No reports of fatigue, fever, or chills


Cardiovascular: No reports of chest pain or palpitations


Respiratory: No reports of shortness of breath or cough


GI: No reports of nausea, vomiting, or diarrhea, reports passing gas with no 

bowel movement


: No reports of dysuria or retention


Neurovascular:  reports of weakness and continued right hip pain





All medications have been reviewed








PHYSICAL EXAMINATION: 





GENERAL: The patient is alert and oriented x4,  Well developed, well nourished. 

Obese. 


HEENT: Pupils are round and equally reacting to light. EOMI. no scleral icterus.

No conjunctival pallor. Normocephalic, atraumatic. No pharyngeal erythema. No t

hyromegaly.  


CARDIOVASCULAR: S1 and S2  muffled 


PULMONARY: diminished breath sounds bilaterally with no wheezing or rhonchi 

noted. 


ABDOMEN: soft.  Nontender on exam.  obese. non-distended, normoactive bowel 

sounds. No palpable organomegaly. 


MUSCULOSKELETAL: No joint swelling or deformity.


EXTREMITIES: No cyanosis, clubbing, or pedal edema.  right hip pain on palpation


NEUROLOGICAL: Gross neurological examination did not reveal any focal deficits. 

Diffuse weakness


SKIN: No rashes. 





Assessment:





Right hip pain with inability to ambulate, secondary to minimally displaced 

fracture of the right femoral neck, status post right hip percutaneous screws


History of atrial fibrillation anticoagulated with eliquis 


History of COPD, not an exacerbation


Chronic hypoxic respiratory failure secondary to COPD, wears 4 L via nasal 

cannula outpatient


History of hypertension


History of anxiety/depression


GI prophylaxis


DVT prophylaxis


Full code





Plan:





Recommend to continue with current medications and management and orthopedics 

following and underwent right hip percutaneous screws, postop day 2.  

Orthopedics has cleared the patient for discharge to [facility] with outpatient follow-

up


Home medications have been reviewed and resumed eliquis 


Encouraged oral intake


Recommend  incentive spirometer and encourage the patient use at least 10 times 

every hour while awake


PT/OT evaluated the patient post surgery and recommending rehab and patient is 

agreeable if her insurance will cover it.  Case management following and has 

submitted for authorization.  Apparently there were some issues with the [employer] information on the insurance and authorization was just admitted today 

awaiting further documentation for the insurance.  Case management and liaison 

from [facility] working on this.


Patient is medically stable and ready for discharge once insurance authorization

is obtained for patient to go to rehab for continued strength and mobility


- Discharge likely on monday as of today insurance authorization has not been 

obtained.





The impression and plan of care has been dictated by Soraya Bruno Nurse 

Practitioner as directed.





Dr. Chris MD


I have performed a history and physical examination and medical decision making 

of this patient, discussed the same with the dictator, and agree with the 

dictators assessment and plan as written, documented as a scribe. Based on total

visit time, I have performed more than 50% of this visit. 








Objective





- Vital Signs


Vital signs: 


                                   Vital Signs











Temp  98.0 F   01/14/23 07:51


 


Pulse  72   01/14/23 08:28


 


Resp  16   01/14/23 08:28


 


BP  129/79   01/14/23 07:51


 


Pulse Ox  97   01/14/23 08:15


 


FiO2      








                                 Intake & Output











 01/13/23 01/14/23 01/14/23





 18:59 06:59 18:59


 


Output Total 800 1400 


 


Balance -800 -1400 


 


Output:   


 


  Urine 800 1400 














- Labs


CBC & Chem 7: 


                                 01/13/23 05:46





                                 01/12/23 07:08


Labs: 


                  Abnormal Lab Results - Last 24 Hours (Table)











  01/13/23 Range/Units





  05:46 


 


WBC  4.46 L  (4.50-10.00)  X 10*3/uL


 


RBC  3.14 L  (4.10-5.20)  X 10*6/uL


 


Hgb  9.9 L  (12.0-15.0)  g/dL


 


Hct  32.8 L  (37.2-46.3)  %


 


MCV  104.5 H  (80.0-97.0)  fL


 


MCHC  30.2 L  (32.0-37.0)  g/dL


 


Lymphocytes #  0.60 L  (0.90-5.00)  X 10*3/uL














Assessment and Plan


Time with Patient: Less than 30

## 2023-01-15 RX ADMIN — ATORVASTATIN CALCIUM SCH MG: 40 TABLET, FILM COATED ORAL at 20:26

## 2023-01-15 RX ADMIN — CARBIDOPA AND LEVODOPA SCH EACH: 25; 100 TABLET ORAL at 20:26

## 2023-01-15 RX ADMIN — IPRATROPIUM BROMIDE AND ALBUTEROL SULFATE SCH ML: .5; 3 SOLUTION RESPIRATORY (INHALATION) at 12:44

## 2023-01-15 RX ADMIN — HYDROCODONE BITARTRATE AND ACETAMINOPHEN PRN EACH: 5; 325 TABLET ORAL at 20:26

## 2023-01-15 RX ADMIN — IPRATROPIUM BROMIDE AND ALBUTEROL SULFATE SCH ML: .5; 3 SOLUTION RESPIRATORY (INHALATION) at 20:31

## 2023-01-15 RX ADMIN — ISOSORBIDE MONONITRATE SCH: 60 TABLET, EXTENDED RELEASE ORAL at 10:29

## 2023-01-15 RX ADMIN — ASPIRIN SCH: 325 TABLET ORAL at 10:29

## 2023-01-15 RX ADMIN — DOCUSATE SODIUM AND SENNOSIDES SCH EACH: 50; 8.6 TABLET ORAL at 20:26

## 2023-01-15 RX ADMIN — FUROSEMIDE SCH MG: 40 TABLET ORAL at 10:29

## 2023-01-15 RX ADMIN — CARBIDOPA AND LEVODOPA SCH EACH: 25; 100 TABLET ORAL at 10:28

## 2023-01-15 RX ADMIN — APIXABAN SCH MG: 5 TABLET, FILM COATED ORAL at 10:27

## 2023-01-15 RX ADMIN — APIXABAN SCH MG: 5 TABLET, FILM COATED ORAL at 20:26

## 2023-01-15 RX ADMIN — SERTRALINE HYDROCHLORIDE SCH MG: 100 TABLET ORAL at 10:28

## 2023-01-15 RX ADMIN — IPRATROPIUM BROMIDE AND ALBUTEROL SULFATE SCH ML: .5; 3 SOLUTION RESPIRATORY (INHALATION) at 08:45

## 2023-01-15 NOTE — P.PN
Subjective


Progress Note Date: 01/15/23








This is a 67-year-old female who presented to the emergency department via EMS 

with concerns of right-sided groin and hip pain.  Patient reports she has been 

having worsening pain of the hip area over the last 2 weeks.  Patient denies any

injury or falls most recently.  Patient reports she follows with Dr. Marks in 

the outpatient setting with a past medical history of atrial fibrillation, COPD,

hypertension, anxiety/depression and denies any illicit drug use or alcohol and 

reports to never smoking.  Patient reports approximately 2 years ago she had her

left hip repaired as she broke the femur head off her left leg requiring 

intervention.  Patient reports she has been compensating since the surgery and 

does have some pain that she reports is chronic.  Patient does have a walker and

a cane at the home.  Patient had CT angiograph due to her diminished pulses and 

left leg pain showing some diminished distal right anterior tibial artery flow 

at the ankle likely related to hemodynamic stenosis otherwise negative along 

with some bilateral lower lobe infiltrates and atelectasis of what she does have

chronic COPD and wears oxygen outpatient.  Orthopedics was consulted and a left 

hip x-ray was done with highly suspicious acute minimally displaced fracture of 

the right femoral neck.  Pulmonary patient reviewed and resumed and will 

continue with heparin subcu and hold eliquis in the event of surgical 

intervention which is tentatively scheduled for tomorrow.  Repeat CT of the hip 

is ordered and pending.  Labs reviewed and within normal limits from ER 

admission.





01/11/2023








Patient is seen and evaluated in follow-up this morning no acute overnight 

issues noted.  Patient continues to have right hip pain with orthopedics 

following and plan is for ORIF with nailing or pinning of the fracture.  Patient

is currently nothing by mouth and will remain until post-surgery.  Will resume 

home medications and continue to monitor closely.  Will await surgical report.  

Patient is afebrile and medically stable for surgery today. 





01/12/2023


Patient is seen in follow-up this morning postop percutaneous screws placed to 

the right hip for fracture.  Orthopedics following and will discuss further 

about resuming anticoagulant.  Patient does have history of atrial fibrillation.

 Patient also with chronic oxygen use of 4 L currently maintained on 5 L.  

Patient is reporting some shortness of breath and will include incentive 

spirometer and encourage the patient to use at least 10 times every hour while 

awake.  Patient to work with physical therapy today and have discussed possible 

ECF if patient continues with weakness when stabilized.  Recommend to hold 

lisinopril as blood pressures have been on the lower side.  Will resume Lasix in

the a.m.





01/13/2023





Patient is seen in follow-up today currently working with physical therapy 

getting up to the chair.  Patient reports continued pain in the right hip 

although somewhat improved.  Patient denies chest pain or worsening shortness of

breath.  Patient is currently on 3 L chronically wears 4 L in the outpatient 

setting.  Patient with incentive spirometer and encourage the patient to 

continue using at least 10 times every hour while awake.  Case management 

following as well as patient will require insurance authorization which is cur

rently pending.  Orthopedics following and has cleared the patient for discharge

to ECF once insurance authorization is obtained.





01/14/2023


Patient is postoperative day #2 right hip percutaneous screws. Up in chair. 

Reports pain currently 4/10 on oral pain medication. Currently on 3 to 4 L of 

oxygen and wears 4L chronically no shortness of breath reported. Using incentive

spirometer. Most recent labs showing white count of 4.46, hg 9.9, sodium 137, 

potassium 4.5, BUN 8, creatinine 0.7. Remains afebrile. No bowel movement, 

patient is receiving senakot, would recommend for suppository if needed. 





01/15/2023


Patient is postoperative day #3 right hip percutaneous screws. She reports 

minimal pain at surgical site and was up ambulating in the room today and was 

able to ambulate to the restroom. She was able to have a bowel movement today 

and reports overall feeling better. Respiratory status at baseline per patient. 

Has had low grade fever of 99.7 recommend to continue incentive spirometer and 

use 10 times an hour. Will order a chest xray as temperature does seem to be 

increasing. 








Review of systems:


Constitutional: No reports of fatigue, fever, or chills


Cardiovascular: No reports of chest pain or palpitations


Respiratory: No reports of shortness of breath or cough


GI: No reports of nausea, vomiting, or diarrhea, 


: No reports of dysuria or retention


Neurovascular:  reports of weakness and continued right hip pain





All medications have been reviewed








PHYSICAL EXAMINATION: 





GENERAL: The patient is alert and oriented x4,  Well developed, well nourished. 

Obese. 


HEENT: Pupils are round and equally reacting to light. EOMI. no scleral icterus.

No conjunctival pallor. Normocephalic, atraumatic. No pharyngeal erythema. No 

thyromegaly.  


CARDIOVASCULAR: S1 and S2  muffled 


PULMONARY: diminished breath sounds bilaterally with no wheezing or rhonchi 

noted. 


ABDOMEN: soft.  Nontender on exam.  obese. non-distended, normoactive bowel 

sounds. No palpable organomegaly. 


MUSCULOSKELETAL: No joint swelling or deformity.


EXTREMITIES: No cyanosis, clubbing, or pedal edema.  right hip pain on palpation


NEUROLOGICAL: Gross neurological examination did not reveal any focal deficits. 

Diffuse weakness


SKIN: No rashes. 





Assessment:





Right hip pain with inability to ambulate, secondary to minimally displaced 

fracture of the right femoral neck, status post right hip percutaneous screws


History of atrial fibrillation anticoagulated with eliquis 


History of COPD, not an exacerbation


Chronic hypoxic respiratory failure secondary to COPD, wears 4 L via nasal 

cannula outpatient


History of hypertension


History of anxiety/depression


GI prophylaxis


DVT prophylaxis


Full code





Plan:





Recommend to continue with current medications and management and orthopedics 

following and underwent right hip percutaneous screws, postop day 3.  

Orthopedics has cleared the patient for discharge to [facility] with outpatient follow-

up


Home medications have been reviewed and resumed eliquis 


Encouraged oral intake


Recommend  incentive spirometer and encourage the patient use at least 10 times 

every hour while awake


PT/OT evaluated the patient post surgery and recommending rehab and patient is 

agreeable if her insurance will cover it.  Case management following and has 

submitted for authorization.  Apparently there were some issues with the 

incorrect information on the insurance and authorization was just admitted today

awaiting further documentation for the insurance.  Case management and liaison 

from [facility] working on this.


Patient is medically stable and ready for discharge once insurance authorization

is obtained for patient to go to rehab for continued strength and mobility


- Discharge likely on monday as of today insurance authorization has not been 

obtained.





The impression and plan of care has been dictated by Soraya Bruno Nurse 

Practitioner as directed.





Dr. Chris MD


I have performed a history and physical examination and medical decision making 

of this patient, discussed the same with the dictator, and agree with the 

dictators assessment and plan as written, documented as a scribe. Based on total

visit time, I have performed more than 50% of this visit. 








Objective





- Vital Signs


Vital signs: 


                                   Vital Signs











Temp  97.5 F L  01/15/23 06:40


 


Pulse  74   01/15/23 12:53


 


Resp  16   01/15/23 12:53


 


BP  121/63   01/15/23 06:40


 


Pulse Ox  94 L  01/15/23 08:47


 


FiO2      








                                 Intake & Output











 01/14/23 01/15/23 01/15/23





 18:59 06:59 18:59


 


Intake Total 400  


 


Output Total 1800 0 


 


Balance -1400 0 


 


Intake:   


 


  Oral 400  


 


Output:   


 


  Urine 1800 0 


 


Other:   


 


  # Bowel Movements   1














- Labs


CBC & Chem 7: 


                                 01/13/23 05:46





                                 01/12/23 07:08





Assessment and Plan


Time with Patient: Less than 30

## 2023-01-15 NOTE — XR
EXAMINATION TYPE: XR chest 2V

 

DATE OF EXAM: 1/15/2023 1:50 PM

 

COMPARISON: none

 

TECHNIQUE: XR chest 2V Frontal, digital subtraction and lateral views of the chest.

 

CLINICAL INDICATION:Female, 67 years old with history of fever; 

 

FINDINGS: 

Lungs/Pleura: Scattered subtle reticular and hazy opacities. No evidence of pneumothorax, focal conso
lidation or pleural effusion. 

Pulmonary vascularity: Unremarkable.

Heart/mediastinum: Cardiomediastinal silhouette is unremarkable. Two lead cardiac conduction device o
verlying the left hemithorax with lead tips projecting over the right ventricle and right atrium.

Musculoskeletal: No acute osseous pathology. Left Shoulder arthroplasty changes.

 

IMPRESSION: 

Subtle scattered opacities which may represent an atypical pneumonia. Correlate for covid 19.

## 2023-01-16 RX ADMIN — HYDROCODONE BITARTRATE AND ACETAMINOPHEN PRN EACH: 5; 325 TABLET ORAL at 17:06

## 2023-01-16 RX ADMIN — IPRATROPIUM BROMIDE AND ALBUTEROL SULFATE SCH ML: .5; 3 SOLUTION RESPIRATORY (INHALATION) at 20:25

## 2023-01-16 RX ADMIN — ATORVASTATIN CALCIUM SCH MG: 40 TABLET, FILM COATED ORAL at 20:40

## 2023-01-16 RX ADMIN — DOCUSATE SODIUM AND SENNOSIDES SCH EACH: 50; 8.6 TABLET ORAL at 20:40

## 2023-01-16 RX ADMIN — SERTRALINE HYDROCHLORIDE SCH MG: 100 TABLET ORAL at 08:01

## 2023-01-16 RX ADMIN — HYDROCODONE BITARTRATE AND ACETAMINOPHEN PRN EACH: 5; 325 TABLET ORAL at 08:00

## 2023-01-16 RX ADMIN — ISOSORBIDE MONONITRATE SCH MG: 60 TABLET, EXTENDED RELEASE ORAL at 08:01

## 2023-01-16 RX ADMIN — IPRATROPIUM BROMIDE AND ALBUTEROL SULFATE SCH ML: .5; 3 SOLUTION RESPIRATORY (INHALATION) at 11:51

## 2023-01-16 RX ADMIN — FUROSEMIDE SCH MG: 40 TABLET ORAL at 08:01

## 2023-01-16 RX ADMIN — CARBIDOPA AND LEVODOPA SCH EACH: 25; 100 TABLET ORAL at 20:40

## 2023-01-16 RX ADMIN — APIXABAN SCH MG: 5 TABLET, FILM COATED ORAL at 20:40

## 2023-01-16 RX ADMIN — APIXABAN SCH MG: 5 TABLET, FILM COATED ORAL at 08:01

## 2023-01-16 RX ADMIN — CARBIDOPA AND LEVODOPA SCH EACH: 25; 100 TABLET ORAL at 08:01

## 2023-01-16 RX ADMIN — ASPIRIN SCH: 325 TABLET ORAL at 08:04

## 2023-01-16 RX ADMIN — IPRATROPIUM BROMIDE AND ALBUTEROL SULFATE SCH ML: .5; 3 SOLUTION RESPIRATORY (INHALATION) at 08:20

## 2023-01-17 RX ADMIN — CARBIDOPA AND LEVODOPA SCH EACH: 25; 100 TABLET ORAL at 08:30

## 2023-01-17 RX ADMIN — CARBIDOPA AND LEVODOPA SCH EACH: 25; 100 TABLET ORAL at 20:29

## 2023-01-17 RX ADMIN — IPRATROPIUM BROMIDE AND ALBUTEROL SULFATE SCH ML: .5; 3 SOLUTION RESPIRATORY (INHALATION) at 20:43

## 2023-01-17 RX ADMIN — IPRATROPIUM BROMIDE AND ALBUTEROL SULFATE SCH ML: .5; 3 SOLUTION RESPIRATORY (INHALATION) at 07:28

## 2023-01-17 RX ADMIN — FUROSEMIDE SCH MG: 40 TABLET ORAL at 08:30

## 2023-01-17 RX ADMIN — ISOSORBIDE MONONITRATE SCH MG: 60 TABLET, EXTENDED RELEASE ORAL at 08:30

## 2023-01-17 RX ADMIN — ATORVASTATIN CALCIUM SCH MG: 40 TABLET, FILM COATED ORAL at 20:29

## 2023-01-17 RX ADMIN — APIXABAN SCH MG: 5 TABLET, FILM COATED ORAL at 20:29

## 2023-01-17 RX ADMIN — HYDROCODONE BITARTRATE AND ACETAMINOPHEN PRN EACH: 5; 325 TABLET ORAL at 23:24

## 2023-01-17 RX ADMIN — APIXABAN SCH MG: 5 TABLET, FILM COATED ORAL at 08:30

## 2023-01-17 RX ADMIN — SERTRALINE HYDROCHLORIDE SCH MG: 100 TABLET ORAL at 08:30

## 2023-01-17 RX ADMIN — DOCUSATE SODIUM AND SENNOSIDES SCH EACH: 50; 8.6 TABLET ORAL at 20:29

## 2023-01-17 RX ADMIN — IPRATROPIUM BROMIDE AND ALBUTEROL SULFATE SCH ML: .5; 3 SOLUTION RESPIRATORY (INHALATION) at 11:06

## 2023-01-17 RX ADMIN — ASPIRIN SCH: 325 TABLET ORAL at 08:36

## 2023-01-17 NOTE — P.PN
Subjective


Progress Note Date: 01/16/23




















This is a 67-year-old female who presented to the emergency department via EMS 

with concerns of right-sided groin and hip pain.  Patient reports she has been 

having worsening pain of the hip area over the last 2 weeks.  Patient denies any

injury or falls most recently.  Patient reports she follows with Dr. Marks in 

the outpatient setting with a past medical history of atrial fibrillation, COPD,

hypertension, anxiety/depression and denies any illicit drug use or alcohol and 

reports to never smoking.  Patient reports approximately 2 years ago she had her

left hip repaired as she broke the femur head off her left leg requiring 

intervention.  Patient reports she has been compensating since the surgery and 

does have some pain that she reports is chronic.  Patient does have a walker and

a cane at the home.  Patient had CT angiograph due to her diminished pulses and 

left leg pain showing some diminished distal right anterior tibial artery flow 

at the ankle likely related to hemodynamic stenosis otherwise negative along 

with some bilateral lower lobe infiltrates and atelectasis of what she does have

chronic COPD and wears oxygen outpatient.  Orthopedics was consulted and a left 

hip x-ray was done with highly suspicious acute minimally displaced fracture of 

the right femoral neck.  Pulmonary patient reviewed and resumed and will 

continue with heparin subcu and hold eliquis in the event of surgical 

intervention which is tentatively scheduled for tomorrow.  Repeat CT of the hip 

is ordered and pending.  Labs reviewed and within normal limits from ER 

admission.





01/11/2023








Patient is seen and evaluated in follow-up this morning no acute overnight 

issues noted.  Patient continues to have right hip pain with orthopedics 

following and plan is for ORIF with nailing or pinning of the fracture.  Patient

is currently nothing by mouth and will remain until post-surgery.  Will resume 

home medications and continue to monitor closely.  Will await surgical report.  

Patient is afebrile and medically stable for surgery today. 





01/12/2023


Patient is seen in follow-up this morning postop percutaneous screws placed to 

the right hip for fracture.  Orthopedics following and will discuss further 

about resuming anticoagulant.  Patient does have history of atrial fibrillation.

 Patient also with chronic oxygen use of 4 L currently maintained on 5 L.  

Patient is reporting some shortness of breath and will include incentive 

spirometer and encourage the patient to use at least 10 times every hour while 

awake.  Patient to work with physical therapy today and have discussed possible 

ECF if patient continues with weakness when stabilized.  Recommend to hold 

lisinopril as blood pressures have been on the lower side.  Will resume Lasix in

the a.m.





01/13/2023





Patient is seen in follow-up today currently working with physical therapy 

getting up to the chair.  Patient reports continued pain in the right hip 

although somewhat improved.  Patient denies chest pain or worsening shortness of

breath.  Patient is currently on 3 L chronically wears 4 L in the outpatient 

setting.  Patient with incentive spirometer and encourage the patient to 

continue using at least 10 times every hour while awake.  Case management 

following as well as patient will require insurance authorization which is 

currently pending.  Orthopedics following and has cleared the patient for 

discharge to ECF once insurance authorization is obtained.





01/14/2023


Patient is postoperative day #2 right hip percutaneous screws. Up in chair. 

Reports pain currently 4/10 on oral pain medication. Currently on 3 to 4 L of 

oxygen and wears 4L chronically no shortness of breath reported. Using incentive

spirometer. Most recent labs showing white count of 4.46, hg 9.9, sodium 137, 

potassium 4.5, BUN 8, creatinine 0.7. Remains afebrile. No bowel movement, 

patient is receiving senakot, would recommend for suppository if needed. 





01/15/2023


Patient is postoperative day #3 right hip percutaneous screws. She reports minim

al pain at surgical site and was up ambulating in the room today and was able to

ambulate to the restroom. She was able to have a bowel movement today and 

reports overall feeling better. Respiratory status at baseline per patient. Has 

had low grade fever of 99.7 recommend to continue incentive spirometer and use 

10 times an hour. Will order a chest xray as temperature does seem to be 

increasing. 





01/16/2023





Patient is seen and evaluated in follow-up status post right hip percutaneous 

screws intervention.  Patient had a chest x-ray done yesterday showing subtle 

scattered opacification is which may represent an atypical pneumonia correlate 

for Covid Covid testing was negative.  Patient denies any increasing shortness 

of breath.  Patient needs encouragement with incentive spirometer and is 

continued on DuoNeb treatments and her chronic oxygen.  Encouraged increased 

activity as tolerated as well with limitations and recommend PT/OT therapy 

daily.  Case management following awaiting insurance authorization .  Unlikely 

today given observed holiday of [place] day.  Will follow up with 

case management the a.m.  Patient is currently afebrile denies chest pain or 

shortness of breath.  Tolerating diet with no reports of nausea or vomiting 

noted.








Review of systems:


Constitutional: No reports of fatigue, fever, or chills


Cardiovascular: No reports of chest pain or palpitations


Respiratory: No reports of shortness of breath or cough


GI: No reports of nausea, vomiting, or diarrhea, reports passing gas with no 

bowel movement


: No reports of dysuria or retention


Neurovascular:  reports of weakness and continued right hip pain





All medications have been reviewed














PHYSICAL EXAMINATION: 





GENERAL: The patient is alert and oriented x4,  Well developed, well nourished. 

Obese.


HEENT: Pupils are round and equally reacting to light. EOMI. no scleral icterus.

No conjunctival pallor. Normocephalic, atraumatic. No pharyngeal erythema. No 

thyromegaly.  


CARDIOVASCULAR: S1 and S2  muffled 


PULMONARY: diminished breath sounds bilaterally with no wheezing or rhonchi 

noted. 


ABDOMEN: soft.  Nontender on exam.  obese. non-distended, normoactive bowel 

sounds. No palpable organomegaly. 


MUSCULOSKELETAL: No joint swelling or deformity.


EXTREMITIES: No cyanosis, clubbing, or pedal edema.  right hip pain on palpation


NEUROLOGICAL: Gross neurological examination did not reveal any focal deficits. 

Diffuse weakness


SKIN: No rashes. 





Assessment:





Right hip pain with inability to ambulate, secondary to minimally displaced 

fracture of the right femoral neck, status post right hip percutaneous screws


History of atrial fibrillation


History of COPD, not an exacerbation


Chronic hypoxic respiratory failure secondary to COPD, wears 4 L via nasal 

cannula outpatient


History of hypertension


History of anxiety/depression


GI prophylaxis


DVT prophylaxis


Full code





Plan:





Recommend to continue with current medications and management and orthopedics 

following and underwent right hip percutaneous screws.  Orthopedics has cleared 

the patient for discharge to [facility] with outpatient follow-up


Home medications have been reviewed and resumed eliquis 


Encouraged oral intake, continue with anti-nausea medications as needed


Recommend  incentive spirometer and encourage the patient use at least 10 times 

every hour while awake


PT/OT evaluated the patient post surgery and recommending rehab and patient is 

agreeable if her insurance will cover it.  Case management following and has 

submitted for authorization.  Due to the observed holiday of [place] 

day, unable to obtain insurance authorization today in case management following

a working with liaison and will follow-up in the a.m. 


Patient is medically stable and ready for discharge once insurance authorization

is obtained for patient to go to rehab for continued strength and mobility





The impression and plan of care has been dictated by Myrtle Hyman, nurse 

practitioner as directed.





Dr. Chris MD


I have performed a history and examination and MDM of this patient, discussed 

the same with the dictator, and  agree with the dictator's assessment and plan 

as written ,documented as a scribe. Based on total visit time,  I have performed

more than 50% of the visit. Any additional findings or plans will be noted. 








Objective





- Vital Signs


Vital signs: 


                                   Vital Signs











Temp  97.3 F L  01/16/23 06:53


 


Pulse  72   01/16/23 08:31


 


Resp  18   01/16/23 08:31


 


BP  102/61   01/16/23 06:53


 


Pulse Ox  97   01/16/23 08:22


 


FiO2      








                                 Intake & Output











 01/15/23 01/16/23 01/16/23





 18:59 06:59 18:59


 


Output Total 1900 1400 


 


Balance -1900 -1400 


 


Output:   


 


  Urine 1900 1400 


 


Other:   


 


  Voiding Method  External Catheter External Catheter


 


  # Bowel Movements 1  














- Labs


CBC & Chem 7: 


                                 01/13/23 05:46





                                 01/12/23 07:08

## 2023-01-17 NOTE — P.PN
Subjective


Progress Note Date: 01/17/23




















This is a 67-year-old female who presented to the emergency department via EMS 

with concerns of right-sided groin and hip pain.  Patient reports she has been 

having worsening pain of the hip area over the last 2 weeks.  Patient denies any

injury or falls most recently.  Patient reports she follows with Dr. Marks in 

the outpatient setting with a past medical history of atrial fibrillation, COPD,

hypertension, anxiety/depression and denies any illicit drug use or alcohol and 

reports to never smoking.  Patient reports approximately 2 years ago she had her

left hip repaired as she broke the femur head off her left leg requiring 

intervention.  Patient reports she has been compensating since the surgery and 

does have some pain that she reports is chronic.  Patient does have a walker and

a cane at the home.  Patient had CT angiograph due to her diminished pulses and 

left leg pain showing some diminished distal right anterior tibial artery flow 

at the ankle likely related to hemodynamic stenosis otherwise negative along 

with some bilateral lower lobe infiltrates and atelectasis of what she does have

chronic COPD and wears oxygen outpatient.  Orthopedics was consulted and a left 

hip x-ray was done with highly suspicious acute minimally displaced fracture of 

the right femoral neck.  Pulmonary patient reviewed and resumed and will 

continue with heparin subcu and hold eliquis in the event of surgical 

intervention which is tentatively scheduled for tomorrow.  Repeat CT of the hip 

is ordered and pending.  Labs reviewed and within normal limits from ER 

admission.





01/11/2023








Patient is seen and evaluated in follow-up this morning no acute overnight 

issues noted.  Patient continues to have right hip pain with orthopedics 

following and plan is for ORIF with nailing or pinning of the fracture.  Patient

is currently nothing by mouth and will remain until post-surgery.  Will resume 

home medications and continue to monitor closely.  Will await surgical report.  

Patient is afebrile and medically stable for surgery today. 





01/12/2023


Patient is seen in follow-up this morning postop percutaneous screws placed to 

the right hip for fracture.  Orthopedics following and will discuss further 

about resuming anticoagulant.  Patient does have history of atrial fibrillation.

 Patient also with chronic oxygen use of 4 L currently maintained on 5 L.  

Patient is reporting some shortness of breath and will include incentive 

spirometer and encourage the patient to use at least 10 times every hour while 

awake.  Patient to work with physical therapy today and have discussed possible 

ECF if patient continues with weakness when stabilized.  Recommend to hold 

lisinopril as blood pressures have been on the lower side.  Will resume Lasix in

the a.m.





01/13/2023





Patient is seen in follow-up today currently working with physical therapy 

getting up to the chair.  Patient reports continued pain in the right hip 

although somewhat improved.  Patient denies chest pain or worsening shortness of

breath.  Patient is currently on 3 L chronically wears 4 L in the outpatient 

setting.  Patient with incentive spirometer and encourage the patient to 

continue using at least 10 times every hour while awake.  Case management 

following as well as patient will require insurance authorization which is 

currently pending.  Orthopedics following and has cleared the patient for 

discharge to ECF once insurance authorization is obtained.





01/14/2023


Patient is postoperative day #2 right hip percutaneous screws. Up in chair. 

Reports pain currently 4/10 on oral pain medication. Currently on 3 to 4 L of 

oxygen and wears 4L chronically no shortness of breath reported. Using incentive

spirometer. Most recent labs showing white count of 4.46, hg 9.9, sodium 137, 

potassium 4.5, BUN 8, creatinine 0.7. Remains afebrile. No bowel movement, 

patient is receiving senakot, would recommend for suppository if needed. 





01/15/2023


Patient is postoperative day #3 right hip percutaneous screws. She reports minim

al pain at surgical site and was up ambulating in the room today and was able to

ambulate to the restroom. She was able to have a bowel movement today and 

reports overall feeling better. Respiratory status at baseline per patient. Has 

had low grade fever of 99.7 recommend to continue incentive spirometer and use 

10 times an hour. Will order a chest xray as temperature does seem to be 

increasing. 





01/16/2023





Patient is seen and evaluated in follow-up status post right hip percutaneous 

screws intervention.  Patient had a chest x-ray done yesterday showing subtle 

scattered opacification is which may represent an atypical pneumonia correlate 

for Covid Covid testing was negative.  Patient denies any increasing shortness 

of breath.  Patient needs encouragement with incentive spirometer and is 

continued on DuoNeb treatments and her chronic oxygen.  Encouraged increased 

activity as tolerated as well with limitations and recommend PT/OT therapy 

daily.  Case management following awaiting insurance authorization .  Unlikely 

today given observed holiday of [place] day.  Will follow up with 

case management the a.m.  Patient is currently afebrile denies chest pain or 

shortness of breath.  Tolerating diet with no reports of nausea or vomiting 

noted.





01/17/2023





Patient is seen and evaluated in follow-up today with no acute overnight issues.

 Patient continues to await for [facility] 

working with the building on authorization.  No word from insurance as of yet.  

Patient is afebrile denies chest pain or worsening shortness of breath.  Patient

chronically wears 4 L via nasal cannula currently maintaining oxygen saturations

above 90 on 2 L.  Strongly recommended to continue with incentive spirometer and

needs encouragement at least 10 times every hour while awake.  Will continue on 

DuoNeb treatments.  Patient tolerating diet had some episode of intermittent 

nausea last night although none reported today.








Review of systems:


Constitutional: No reports of fatigue, fever, or chills


Cardiovascular: No reports of chest pain or palpitations


Respiratory: No reports of shortness of breath or cough


GI: No reports of nausea, vomiting, or diarrhea, reports passing gas and having 

bowel movements


: No reports of dysuria or retention


Neurovascular:  reports of weakness and continued right hip pain





All medications have been reviewed














PHYSICAL EXAMINATION: 





GENERAL: The patient is alert and oriented x4,  Well developed, well nourished. 

Obese.


HEENT: Pupils are round and equally reacting to light. EOMI. no scleral icterus.

No conjunctival pallor. Normocephalic, atraumatic. No pharyngeal erythema. No 

thyromegaly.  


CARDIOVASCULAR: S1 and S2  muffled 


PULMONARY: diminished breath sounds bilaterally with no wheezing or rhonchi 

noted. 


ABDOMEN: soft.  Nontender on exam.  obese. non-distended, normoactive bowel 

sounds. No palpable organomegaly. 


MUSCULOSKELETAL: No joint swelling or deformity.


EXTREMITIES: No cyanosis, clubbing, or pedal edema.  right hip tender on 

palpation although slightly improved


NEUROLOGICAL: Gross neurological examination did not reveal any focal deficits. 

Diffuse weakness


SKIN: No rashes. 





Assessment:





Right hip pain with inability to ambulate, secondary to minimally displaced 

fracture of the right femoral neck, status post right hip percutaneous screws


History of atrial fibrillation


History of COPD, not an exacerbation


Chronic hypoxic respiratory failure secondary to COPD, wears 4 L via nasal c

annula outpatient


History of hypertension


History of anxiety/depression


GI prophylaxis


DVT prophylaxis


Full code





Plan:





Recommend to continue with current medications and management and orthopedics f

collins and underwent right hip percutaneous screws.  Orthopedics has cleared 

the patient for discharge to [facility] with outpatient follow-up


Home medications have been reviewed and resumed eliquis 


Encouraged oral intake, continue with anti-nausea medications as needed


Recommend  incentive spirometer and encourage the patient use at least 10 times 

every hour while awake


PT/OT following and recommending  rehab and patient is agreeable if her 

insurance will cover it.  Case management following and has submitted for 

authorization.  Still no authorization today and [facility] liaison working with 

the building inquiring with the insurance company.


Patient is medically stable and ready for discharge once insurance authorization

is obtained for patient to go to rehab for continued strength and mobility


Will continue to monitor the patient closely during hospitalization.





The impression and plan of care has been dictated by Myrtle Hyman, nurse 

practitioner as directed.





Dr. Chris MD


I have performed a history and examination and MDM of this patient, discussed 

the same with the dictator, and  agree with the dictator's assessment and plan 

as written ,documented as a scribe. Based on total visit time,  I have performed

more than 50% of the visit. Any additional findings or plans will be noted. 








Objective





- Vital Signs


Vital signs: 


                                   Vital Signs











Temp  98.1 F   01/17/23 06:55


 


Pulse  74   01/17/23 11:16


 


Resp  18   01/17/23 06:55


 


BP  122/68   01/17/23 06:55


 


Pulse Ox  93 L  01/17/23 06:55


 


FiO2      








                                 Intake & Output











 01/16/23 01/17/23 01/17/23





 18:59 06:59 18:59


 


Intake Total 1080  


 


Output Total 700 200 400


 


Balance 380 -200 -400


 


Weight   90 kg


 


Intake:   


 


  Oral 1080  


 


Output:   


 


  Urine 700 200 400


 


Other:   


 


  Voiding Method External Catheter External Catheter 


 


  # Voids  1 














- Labs


CBC & Chem 7: 


                                 01/13/23 05:46





                                 01/12/23 07:08

## 2023-01-18 RX ADMIN — CARBIDOPA AND LEVODOPA SCH EACH: 25; 100 TABLET ORAL at 20:19

## 2023-01-18 RX ADMIN — IPRATROPIUM BROMIDE AND ALBUTEROL SULFATE SCH ML: .5; 3 SOLUTION RESPIRATORY (INHALATION) at 15:20

## 2023-01-18 RX ADMIN — SERTRALINE HYDROCHLORIDE SCH MG: 100 TABLET ORAL at 08:24

## 2023-01-18 RX ADMIN — DOCUSATE SODIUM AND SENNOSIDES SCH: 50; 8.6 TABLET ORAL at 20:19

## 2023-01-18 RX ADMIN — HYDROCODONE BITARTRATE AND ACETAMINOPHEN PRN EACH: 5; 325 TABLET ORAL at 08:06

## 2023-01-18 RX ADMIN — ATORVASTATIN CALCIUM SCH MG: 40 TABLET, FILM COATED ORAL at 20:19

## 2023-01-18 RX ADMIN — IPRATROPIUM BROMIDE AND ALBUTEROL SULFATE SCH ML: .5; 3 SOLUTION RESPIRATORY (INHALATION) at 08:07

## 2023-01-18 RX ADMIN — ASPIRIN SCH: 325 TABLET ORAL at 08:21

## 2023-01-18 RX ADMIN — IPRATROPIUM BROMIDE AND ALBUTEROL SULFATE SCH ML: .5; 3 SOLUTION RESPIRATORY (INHALATION) at 19:26

## 2023-01-18 RX ADMIN — HYDROCODONE BITARTRATE AND ACETAMINOPHEN PRN EACH: 5; 325 TABLET ORAL at 23:31

## 2023-01-18 RX ADMIN — FUROSEMIDE SCH MG: 40 TABLET ORAL at 08:24

## 2023-01-18 RX ADMIN — ISOSORBIDE MONONITRATE SCH MG: 60 TABLET, EXTENDED RELEASE ORAL at 08:24

## 2023-01-18 RX ADMIN — APIXABAN SCH MG: 5 TABLET, FILM COATED ORAL at 20:19

## 2023-01-18 RX ADMIN — APIXABAN SCH MG: 5 TABLET, FILM COATED ORAL at 08:24

## 2023-01-18 RX ADMIN — CARBIDOPA AND LEVODOPA SCH EACH: 25; 100 TABLET ORAL at 08:24

## 2023-01-19 VITALS
SYSTOLIC BLOOD PRESSURE: 97 MMHG | TEMPERATURE: 97.2 F | DIASTOLIC BLOOD PRESSURE: 60 MMHG | HEART RATE: 81 BPM | RESPIRATION RATE: 16 BRPM

## 2023-01-19 RX ADMIN — IPRATROPIUM BROMIDE AND ALBUTEROL SULFATE SCH ML: .5; 3 SOLUTION RESPIRATORY (INHALATION) at 12:41

## 2023-01-19 RX ADMIN — IPRATROPIUM BROMIDE AND ALBUTEROL SULFATE SCH ML: .5; 3 SOLUTION RESPIRATORY (INHALATION) at 07:58

## 2023-01-19 RX ADMIN — APIXABAN SCH MG: 5 TABLET, FILM COATED ORAL at 09:09

## 2023-01-19 RX ADMIN — HYDROCODONE BITARTRATE AND ACETAMINOPHEN PRN EACH: 5; 325 TABLET ORAL at 14:42

## 2023-01-19 RX ADMIN — SERTRALINE HYDROCHLORIDE SCH MG: 100 TABLET ORAL at 09:08

## 2023-01-19 RX ADMIN — HYDROCODONE BITARTRATE AND ACETAMINOPHEN PRN EACH: 5; 325 TABLET ORAL at 09:09

## 2023-01-19 RX ADMIN — FUROSEMIDE SCH MG: 40 TABLET ORAL at 09:08

## 2023-01-19 RX ADMIN — ISOSORBIDE MONONITRATE SCH MG: 60 TABLET, EXTENDED RELEASE ORAL at 09:09

## 2023-01-19 RX ADMIN — ASPIRIN SCH: 325 TABLET ORAL at 09:12

## 2023-01-19 RX ADMIN — CARBIDOPA AND LEVODOPA SCH EACH: 25; 100 TABLET ORAL at 09:08

## 2023-01-19 NOTE — P.PN
Subjective


Progress Note Date: 01/18/23




















This is a 67-year-old female who presented to the emergency department via EMS 

with concerns of right-sided groin and hip pain.  Patient reports she has been 

having worsening pain of the hip area over the last 2 weeks.  Patient denies any

injury or falls most recently.  Patient reports she follows with Dr. Marks in 

the outpatient setting with a past medical history of atrial fibrillation, COPD,

hypertension, anxiety/depression and denies any illicit drug use or alcohol and 

reports to never smoking.  Patient reports approximately 2 years ago she had her

left hip repaired as she broke the femur head off her left leg requiring 

intervention.  Patient reports she has been compensating since the surgery and 

does have some pain that she reports is chronic.  Patient does have a walker and

a cane at the home.  Patient had CT angiograph due to her diminished pulses and 

left leg pain showing some diminished distal right anterior tibial artery flow 

at the ankle likely related to hemodynamic stenosis otherwise negative along 

with some bilateral lower lobe infiltrates and atelectasis of what she does have

chronic COPD and wears oxygen outpatient.  Orthopedics was consulted and a left 

hip x-ray was done with highly suspicious acute minimally displaced fracture of 

the right femoral neck.  Pulmonary patient reviewed and resumed and will 

continue with heparin subcu and hold eliquis in the event of surgical 

intervention which is tentatively scheduled for tomorrow.  Repeat CT of the hip 

is ordered and pending.  Labs reviewed and within normal limits from ER 

admission.





01/11/2023








Patient is seen and evaluated in follow-up this morning no acute overnight 

issues noted.  Patient continues to have right hip pain with orthopedics 

following and plan is for ORIF with nailing or pinning of the fracture.  Patient

is currently nothing by mouth and will remain until post-surgery.  Will resume 

home medications and continue to monitor closely.  Will await surgical report.  

Patient is afebrile and medically stable for surgery today. 





01/12/2023


Patient is seen in follow-up this morning postop percutaneous screws placed to 

the right hip for fracture.  Orthopedics following and will discuss further 

about resuming anticoagulant.  Patient does have history of atrial fibrillation.

 Patient also with chronic oxygen use of 4 L currently maintained on 5 L.  

Patient is reporting some shortness of breath and will include incentive 

spirometer and encourage the patient to use at least 10 times every hour while 

awake.  Patient to work with physical therapy today and have discussed possible 

ECF if patient continues with weakness when stabilized.  Recommend to hold 

lisinopril as blood pressures have been on the lower side.  Will resume Lasix in

the a.m.





01/13/2023





Patient is seen in follow-up today currently working with physical therapy 

getting up to the chair.  Patient reports continued pain in the right hip 

although somewhat improved.  Patient denies chest pain or worsening shortness of

breath.  Patient is currently on 3 L chronically wears 4 L in the outpatient 

setting.  Patient with incentive spirometer and encourage the patient to 

continue using at least 10 times every hour while awake.  Case management 

following as well as patient will require insurance authorization which is 

currently pending.  Orthopedics following and has cleared the patient for 

discharge to ECF once insurance authorization is obtained.





01/14/2023


Patient is postoperative day #2 right hip percutaneous screws. Up in chair. 

Reports pain currently 4/10 on oral pain medication. Currently on 3 to 4 L of 

oxygen and wears 4L chronically no shortness of breath reported. Using incentive

spirometer. Most recent labs showing white count of 4.46, hg 9.9, sodium 137, 

potassium 4.5, BUN 8, creatinine 0.7. Remains afebrile. No bowel movement, 

patient is receiving senakot, would recommend for suppository if needed. 





01/15/2023


Patient is postoperative day #3 right hip percutaneous screws. She reports minim

al pain at surgical site and was up ambulating in the room today and was able to

ambulate to the restroom. She was able to have a bowel movement today and 

reports overall feeling better. Respiratory status at baseline per patient. Has 

had low grade fever of 99.7 recommend to continue incentive spirometer and use 

10 times an hour. Will order a chest xray as temperature does seem to be 

increasing. 





01/16/2023





Patient is seen and evaluated in follow-up status post right hip percutaneous 

screws intervention.  Patient had a chest x-ray done yesterday showing subtle 

scattered opacification is which may represent an atypical pneumonia correlate 

for Covid Covid testing was negative.  Patient denies any increasing shortness 

of breath.  Patient needs encouragement with incentive spirometer and is 

continued on DuoNeb treatments and her chronic oxygen.  Encouraged increased 

activity as tolerated as well with limitations and recommend PT/OT therapy 

daily.  Case management following awaiting insurance authorization .  Unlikely 

today given observed holiday of [place] day.  Will follow up with 

case management the a.m.  Patient is currently afebrile denies chest pain or 

shortness of breath.  Tolerating diet with no reports of nausea or vomiting 

noted.





01/17/2023





Patient is seen and evaluated in follow-up today with no acute overnight issues.

 Patient continues to [facility] 

working with the [facility] on authorization.  No word from insurance as of yet.  

Patient is afebrile denies chest pain or worsening shortness of breath.  Patient

chronically wears 4 L via nasal cannula currently maintaining oxygen saturations

above 90 on 2 L.  Strongly recommended to continue with incentive spirometer and

needs encouragement at least 10 times every hour while awake.  Will continue on 

DuoNeb treatments.  Patient tolerating diet had some episode of intermittent 

nausea last night although none reported today.





01/18/2023


Patient seen and evaluated in follow-up this morning currently sitting up in a 

chair.  Right hip surgical site is dry and intact and dressing remains.  Will 

discuss with orthopedics if dressing can be removed at this point.  Patient is 

afebrile denies chest pain or any worsening shortness of breath.  Patient does 

have history of COPD and chronically wears oxygen and will continue with DuoNeb 

treatments.  Patient denies nausea or vomiting and tolerating diet.  Case 

management following and working with [place] liaison as insurance has had 

multiple complications and having difficulty obtaining authorization.  HAS been 

submitted per [facility] and awaiting for approval.








Review of systems:


Constitutional: No reports of fatigue, fever, or chills


Cardiovascular: No reports of chest pain or palpitations


Respiratory: No reports of shortness of breath or cough


GI: No reports of nausea, vomiting, or diarrhea


: No reports of dysuria or retention


Neurovascular:  reports of weakness and continued right hip pain with ambulation





All medications have been reviewed





PHYSICAL EXAMINATION: 





GENERAL: The patient is alert and oriented x4,  Well developed, well nourished. 

Obese.


HEENT: Pupils are round and equally reacting to light. EOMI. no scleral icterus.

No conjunctival pallor. Normocephalic, atraumatic. No pharyngeal erythema. No 

thyromegaly.  


CARDIOVASCULAR: S1 and S2  muffled 


PULMONARY: diminished breath sounds bilaterally with no wheezing or rhonchi 

noted. 


ABDOMEN: soft.  Nontender on exam.  obese. non-distended, normoactive bowel 

sounds. No palpable organomegaly. 


MUSCULOSKELETAL: No joint swelling or deformity.


EXTREMITIES: No cyanosis, clubbing, or pedal edema.  right hip tenderness 

improved , surgical dressing remains dry and intact


NEUROLOGICAL: Gross neurological examination did not reveal any focal deficits. 

Diffuse weakness


SKIN: No rashes. 





Assessment:





Right hip pain with inability to ambulate, secondary to minimally displaced 

fracture of the right femoral neck, status post right hip percutaneous screws


History of atrial fibrillation


History of COPD, not in exacerbation


Chronic hypoxic respiratory failure secondary to COPD, wears 4 L via nasal 

cannula outpatient


History of hypertension


History of anxiety/depression


GI prophylaxis


DVT prophylaxis


Full code





Plan:





Recommend to continue with current medications and management and orthopedics 

following and underwent right hip percutaneous screws.  Orthopedics has cleared 

the patient for discharge to [facility] with outpatient follow-up.  Surgical dressing 

remains and will discuss with orthopedics if okay to remove


Home medications have been reviewed and resumed eliquis 


Encouraged oral intake, continue with anti-nausea medications as needed


Recommend  incentive spirometer and encourage the patient use at least 10 times 

every hour while awake, patient with chronic COPD we'll continue breathing 

treatments


PT/OT following and recommending  rehab and patient is agreeable if her 

insurance will cover it.  Case management following and has submitted for 

authorization once again with a different pathway.


Patient is medically stable and ready for discharge once insurance authorization

is obtained for patient to go to rehab for continued strength and mobility.  

Patient with weightbearing restrictions per orthopedics would benefit from rehab

to get back to her baseline.  Patient continues with weakness


Will continue to monitor the patient closely during hospitalization.





The impression and plan of care has been dictated by Myrtle Hyman, nurse 

practitioner as directed.





Dr. Chris MD


I have performed a history and examination and MDM of this patient, discussed 

the same with the dictator, and  agree with the dictator's assessment and plan 

as written ,documented as a scribe. Based on total visit time,  I have performed

more than 50% of the visit. Any additional findings or plans will be noted. 








Objective





- Vital Signs


Vital signs: 


                                   Vital Signs











Temp  98.6 F   01/19/23 00:17


 


Pulse  77   01/19/23 00:17


 


Resp  18   01/19/23 00:17


 


BP  104/62   01/19/23 00:17


 


Pulse Ox  92 L  01/19/23 00:17


 


FiO2      








                                 Intake & Output











 01/18/23 01/18/23 01/19/23





 06:59 18:59 06:59


 


Output Total 950 300 


 


Balance -950 -300 


 


Output:   


 


  Urine 950 300 


 


Other:   


 


  Voiding Method External Catheter Indwelling Catheter External Catheter














- Labs


CBC & Chem 7: 


                                 01/13/23 05:46





                                 01/12/23 07:08

## 2023-01-20 NOTE — P.DS
Providers


Date of admission: 


01/10/23 13:16





Expected date of discharge: 01/19/23


Attending physician: 


Rishi Mart





Consults: 





                                        





01/09/23 23:27


Consult Physician Urgent 


   Consulting Provider: Oj Rousseau


   Consult Reason/Comments: R hip pain


   Do you want consulting provider notified?: Yes











Primary care physician: 


Marlo [facility] Course: 











Final diagnosis





Right hip pain with inability to ambulate, secondary to minimally displaced 

fracture of the right femoral neck, status post right hip percutaneous screws


History of atrial fibrillation


History of COPD, not in exacerbation


Chronic hypoxic respiratory failure secondary to COPD, wears 4 L via nasal 

cannula outpatient


History of hypertension


History of anxiety/depression


GI prophylaxis


DVT prophylaxis


Full code








Discharge disposition


Patient is being discharged in a stable condition with guarded prognosis to 

home.  Patient will follow-up with    in the outpatient setting upon 

discharge.  Patient is to continue with hemodialysis as scheduled.  Total time 

taken is greater than 35 minutes.





Hospital course


This is a 67-year-old female who was recently admitted with right hip pain and 

found to have a fracture of the right femoral neck and underwent percutaneous 

screws placement with fixation.  Patient has been cleared by orthopedics for 

discharge.  Initially patient was weak and  recommending rehab with physical 

therapy following having difficulty with insurance authorization.  Patient 

continue to work with physical therapy daily during the week while hospitalized 

showing some improvement and patient now wishes to go home.  Home care is being 

arranged.  Patient reports she has support at home with people that can help 

her. Currently no reports of chest pain, shortness of breath, or palpitations.  

Patient is afebrile.  No reports of nausea or vomiting and patient is tolerating

diet.  Patient will be going home with home care today.  Guarded prognosis





Physical exam:








Gen: This is a 67-year-old female who is awake, alert and oriented 3, well-

developed, well-nourished, obese


HEENT: Head is atraumatic, normocephalic. Pupils equal, round. Sclerae is 

anicteric. 


NECK: Supple. No JVD. No lymphadenopathy. No thyromegaly. 


LUNGS: Diminished breath sounds bilaterally with scattered rhonchi.  No 

intercostal retractions.


HEART: Regular rate and rhythm. No murmur. 


ABDOMEN: Soft. Bowel sounds are present. No masses.  No tenderness.


EXTREMITIES: No pedal edema.  No calf tenderness.  Right hip surgical site is 

dry and intact with no surrounding erythema or swelling noted


NEUROLOGICAL: Patient is awake, alert and oriented x3. Cranial nerves 2 through 

12 are grossly intact. 





Please refer to medication reconciliation sheet for a list of medications.





The impression and plan of care has been dictated by Myrtle Hyman, Nurse 

Practitioner as directed.





Dr. Chris MD


I have performed a history and examination and MDM of this patient, discussed 

the same with the dictator, and  agree with the dictator's assessment and plan 

as written ,documented as a scribe. Based on total visit time,  I have performed

more than 50% of the visit.  


Patient Condition at Discharge: Fair





Plan - Discharge Summary


Discharge Rx Participant: Yes


New Discharge Prescriptions: 


New


   HYDROcodone/APAP 5-325MG [Norco 5-325] 1 tab PO Q6HR PRN 3 Days #12 tab


     PRN Reason: Pain


   Magnesium Hydroxide [Milk of Magnesia Concentrate] 2,400 mg PO DAILY PRN  ml


     PRN Reason: Constipation


   Ipratropium-Albuterol Nebulize [Duoneb 0.5 mg-3 mg/3 ml Soln] 3 ml INHALATION

TID #100 each





Continue


   carvediloL [Coreg] 3.125 mg PO BID


   Dicyclomine [Bentyl] 10 mg PO QID PRN


     PRN Reason: ibs


   Carbidopa/Levodopa [Sinemet  mg Tablet] 1 tab PO BID


   Apixaban [Eliquis] 5 mg PO BID


   Liothyronine Sodium [Cytomel] 25 mcg PO DAILY


   Atorvastatin [Lipitor] 40 mg PO HS


   ARIPiprazole [Abilify] 2 mg PO DAILY


   Sertraline [Zoloft] 200 mg PO DAILY


   Nitroglycerin Sl Tabs [Nitrostat] 0.4 mg SL Q5M PRN


     PRN Reason: Chest Pain


   traZODone HCL [Desyrel] 100 mg PO DAILY@0400


   traZODone  mg PO HS


   Isosorbide Mononitrate ER [Imdur] 60 mg PO DAILY


   Furosemide [Lasix] 40 mg PO DAILY


   Alendronate Sodium [Fosamax] 70 mg PO Q7D





Discontinued


   lisinopriL [Zestril] 5 mg PO DAILY


   Enalapril [Vasotec] 10 mg PO DAILY


Discharge Medication List





ARIPiprazole [Abilify] 2 mg PO DAILY 01/10/23 [History]


Alendronate Sodium [Fosamax] 70 mg PO Q7D 01/10/23 [History]


Apixaban [Eliquis] 5 mg PO BID 01/10/23 [History]


Atorvastatin [Lipitor] 40 mg PO HS 01/10/23 [History]


Carbidopa/Levodopa [Sinemet  mg Tablet] 1 tab PO BID 01/10/23 [History]


Dicyclomine [Bentyl] 10 mg PO QID PRN 01/10/23 [History]


Furosemide [Lasix] 40 mg PO DAILY 01/10/23 [History]


Isosorbide Mononitrate ER [Imdur] 60 mg PO DAILY 01/10/23 [History]


Liothyronine Sodium [Cytomel] 25 mcg PO DAILY 01/10/23 [History]


Nitroglycerin Sl Tabs [Nitrostat] 0.4 mg SL Q5M PRN 01/10/23 [History]


Sertraline [Zoloft] 200 mg PO DAILY 01/10/23 [History]


carvediloL [Coreg] 3.125 mg PO BID 01/10/23 [History]


traZODone  mg PO HS 01/10/23 [History]


traZODone HCL [Desyrel] 100 mg PO DAILY@0400 01/10/23 [History]


Magnesium Hydroxide [Milk of Magnesia Concentrate] 2,400 mg PO DAILY PRN  ml 

01/13/23 [Rx]


HYDROcodone/APAP 5-325MG [Norco 5-325] 1 tab PO Q6HR PRN 3 Days #12 tab 01/19/23

[Rx]


Ipratropium-Albuterol Nebulize [Duoneb 0.5 mg-3 mg/3 ml Soln] 3 ml INHALATION 

TID #100 each 01/19/23 [Rx]








Follow up Appointment(s)/Referral(s): 


Marlo Marks MD [Primary Care Provider] - 1-2 days (Please call office for

your appointment.)


[facility], [NON-STAFF] - As Needed


Oj Rousseau DO [Doctor of Osteopathic Medicine] - 01/26/23 10:20 am


Patient Instructions/Handouts:  ORIF (DC)


Activity/Diet/Wound Care/Special Instructions: 





Activity Limited until follow-up


Follow-up primary care provider on discharge


Follow-up orthopedics at your scheduled appointment


Continue with home care


Continue taking medications as prescribed


Continue with Norco with half a tab to 1 tab as needed for severe pain otherwise

okay to use Tylenol


Continue current diet


Discharge Disposition: HOME WITH HOME HEALTH SERVICES

## 2023-04-15 ENCOUNTER — HOSPITAL ENCOUNTER (INPATIENT)
Dept: HOSPITAL 47 - EC | Age: 68
LOS: 9 days | DRG: 283 | End: 2023-04-24
Payer: MEDICARE

## 2023-04-15 VITALS — BODY MASS INDEX: 34.4 KG/M2

## 2023-04-15 DIAGNOSIS — I50.22: ICD-10-CM

## 2023-04-15 DIAGNOSIS — I11.0: ICD-10-CM

## 2023-04-15 DIAGNOSIS — F41.9: ICD-10-CM

## 2023-04-15 DIAGNOSIS — R56.9: ICD-10-CM

## 2023-04-15 DIAGNOSIS — I21.4: ICD-10-CM

## 2023-04-15 DIAGNOSIS — Z71.3: ICD-10-CM

## 2023-04-15 DIAGNOSIS — Z99.81: ICD-10-CM

## 2023-04-15 DIAGNOSIS — J69.0: ICD-10-CM

## 2023-04-15 DIAGNOSIS — Z82.49: ICD-10-CM

## 2023-04-15 DIAGNOSIS — G93.1: ICD-10-CM

## 2023-04-15 DIAGNOSIS — Z66: ICD-10-CM

## 2023-04-15 DIAGNOSIS — I46.2: ICD-10-CM

## 2023-04-15 DIAGNOSIS — I42.9: ICD-10-CM

## 2023-04-15 DIAGNOSIS — G93.49: ICD-10-CM

## 2023-04-15 DIAGNOSIS — G93.6: ICD-10-CM

## 2023-04-15 DIAGNOSIS — Z98.61: ICD-10-CM

## 2023-04-15 DIAGNOSIS — E87.20: ICD-10-CM

## 2023-04-15 DIAGNOSIS — Z79.4: ICD-10-CM

## 2023-04-15 DIAGNOSIS — I95.9: ICD-10-CM

## 2023-04-15 DIAGNOSIS — Z79.899: ICD-10-CM

## 2023-04-15 DIAGNOSIS — I47.20: Primary | ICD-10-CM

## 2023-04-15 DIAGNOSIS — Z51.5: ICD-10-CM

## 2023-04-15 DIAGNOSIS — J44.0: ICD-10-CM

## 2023-04-15 DIAGNOSIS — I45.9: ICD-10-CM

## 2023-04-15 DIAGNOSIS — I48.0: ICD-10-CM

## 2023-04-15 DIAGNOSIS — Z20.822: ICD-10-CM

## 2023-04-15 DIAGNOSIS — Z86.73: ICD-10-CM

## 2023-04-15 DIAGNOSIS — Z95.828: ICD-10-CM

## 2023-04-15 DIAGNOSIS — I25.10: ICD-10-CM

## 2023-04-15 DIAGNOSIS — F32.A: ICD-10-CM

## 2023-04-15 DIAGNOSIS — J96.21: ICD-10-CM

## 2023-04-15 DIAGNOSIS — Z79.01: ICD-10-CM

## 2023-04-15 DIAGNOSIS — M96.A3: ICD-10-CM

## 2023-04-15 DIAGNOSIS — E66.9: ICD-10-CM

## 2023-04-15 DIAGNOSIS — Z77.22: ICD-10-CM

## 2023-04-15 DIAGNOSIS — Z45.02: ICD-10-CM

## 2023-04-15 DIAGNOSIS — Z79.83: ICD-10-CM

## 2023-04-15 DIAGNOSIS — J98.11: ICD-10-CM

## 2023-04-15 LAB
ALBUMIN SERPL-MCNC: 3.8 G/DL (ref 3.5–5)
ALP SERPL-CCNC: 73 U/L (ref 38–126)
ALT SERPL-CCNC: 14 U/L (ref 4–34)
AMYLASE SERPL-CCNC: 83 U/L (ref 30–110)
ANION GAP SERPL CALC-SCNC: 16 MMOL/L
APTT BLD: 23.9 SEC (ref 22–30)
AST SERPL-CCNC: 57 U/L (ref 14–36)
BASOPHILS # BLD AUTO: 0 K/UL (ref 0–0.2)
BASOPHILS NFR BLD AUTO: 0 %
BUN SERPL-SCNC: 12 MG/DL (ref 7–17)
CALCIUM SPEC-MCNC: 8.6 MG/DL (ref 8.4–10.2)
CHLORIDE SERPL-SCNC: 104 MMOL/L (ref 98–107)
CO2 BLDA-SCNC: 20 MMOL/L (ref 19–24)
CO2 BLDA-SCNC: 21 MMOL/L (ref 19–24)
CO2 SERPL-SCNC: 17 MMOL/L (ref 22–30)
EOSINOPHIL # BLD AUTO: 0.1 K/UL (ref 0–0.7)
EOSINOPHIL NFR BLD AUTO: 1 %
ERYTHROCYTE [DISTWIDTH] IN BLOOD BY AUTOMATED COUNT: 3.53 M/UL (ref 3.8–5.4)
ERYTHROCYTE [DISTWIDTH] IN BLOOD: 14.1 % (ref 11.5–15.5)
GLUCOSE BLD-MCNC: 136 MG/DL (ref 70–110)
GLUCOSE BLD-MCNC: 150 MG/DL (ref 70–110)
GLUCOSE BLD-MCNC: 199 MG/DL (ref 70–110)
GLUCOSE SERPL-MCNC: 325 MG/DL (ref 74–99)
HCO3 BLDA-SCNC: 19 MMOL/L (ref 21–25)
HCO3 BLDA-SCNC: 19 MMOL/L (ref 21–25)
HCT VFR BLD AUTO: 37.9 % (ref 34–46)
HGB BLD-MCNC: 11.4 GM/DL (ref 11.4–16)
INR PPP: 1.1 (ref ?–1.2)
LIPASE SERPL-CCNC: 242 U/L (ref 23–300)
LYMPHOCYTES # SPEC AUTO: 4.4 K/UL (ref 1–4.8)
LYMPHOCYTES NFR SPEC AUTO: 45 %
MAGNESIUM SPEC-SCNC: 2.2 MG/DL (ref 1.6–2.3)
MCH RBC QN AUTO: 32.2 PG (ref 25–35)
MCHC RBC AUTO-ENTMCNC: 30 G/DL (ref 31–37)
MCV RBC AUTO: 107.2 FL (ref 80–100)
MONOCYTES # BLD AUTO: 0.3 K/UL (ref 0–1)
MONOCYTES NFR BLD AUTO: 3 %
NEUTROPHILS # BLD AUTO: 4.7 K/UL (ref 1.3–7.7)
NEUTROPHILS NFR BLD AUTO: 48 %
PCO2 BLDA: 44 MMHG (ref 35–45)
PCO2 BLDA: 53 MMHG (ref 35–45)
PH BLDA: 7.17 [PH] (ref 7.35–7.45)
PH BLDA: 7.25 [PH] (ref 7.35–7.45)
PLATELET # BLD AUTO: 246 K/UL (ref 150–450)
PO2 BLDA: 158 MMHG (ref 83–108)
PO2 BLDA: 75 MMHG (ref 83–108)
POTASSIUM SERPL-SCNC: 4.3 MMOL/L (ref 3.5–5.1)
PROT SERPL-MCNC: 6.7 G/DL (ref 6.3–8.2)
PT BLD: 11.1 SEC (ref 9–12)
SODIUM SERPL-SCNC: 137 MMOL/L (ref 137–145)
WBC # BLD AUTO: 9.7 K/UL (ref 3.8–10.6)

## 2023-04-15 PROCEDURE — 85025 COMPLETE CBC W/AUTO DIFF WBC: CPT

## 2023-04-15 PROCEDURE — 96361 HYDRATE IV INFUSION ADD-ON: CPT

## 2023-04-15 PROCEDURE — 82805 BLOOD GASES W/O2 SATURATION: CPT

## 2023-04-15 PROCEDURE — 06HY33Z INSERTION OF INFUSION DEVICE INTO LOWER VEIN, PERCUTANEOUS APPROACH: ICD-10-PCS

## 2023-04-15 PROCEDURE — 95822 EEG COMA OR SLEEP ONLY: CPT

## 2023-04-15 PROCEDURE — 87040 BLOOD CULTURE FOR BACTERIA: CPT

## 2023-04-15 PROCEDURE — 83605 ASSAY OF LACTIC ACID: CPT

## 2023-04-15 PROCEDURE — 93005 ELECTROCARDIOGRAM TRACING: CPT

## 2023-04-15 PROCEDURE — 80185 ASSAY OF PHENYTOIN TOTAL: CPT

## 2023-04-15 PROCEDURE — 85384 FIBRINOGEN ACTIVITY: CPT

## 2023-04-15 PROCEDURE — 36415 COLL VENOUS BLD VENIPUNCTURE: CPT

## 2023-04-15 PROCEDURE — 94640 AIRWAY INHALATION TREATMENT: CPT

## 2023-04-15 PROCEDURE — 70450 CT HEAD/BRAIN W/O DYE: CPT

## 2023-04-15 PROCEDURE — 99291 CRITICAL CARE FIRST HOUR: CPT

## 2023-04-15 PROCEDURE — 95816 EEG AWAKE AND DROWSY: CPT

## 2023-04-15 PROCEDURE — 84484 ASSAY OF TROPONIN QUANT: CPT

## 2023-04-15 PROCEDURE — 74176 CT ABD & PELVIS W/O CONTRAST: CPT

## 2023-04-15 PROCEDURE — 93306 TTE W/DOPPLER COMPLETE: CPT

## 2023-04-15 PROCEDURE — 80048 BASIC METABOLIC PNL TOTAL CA: CPT

## 2023-04-15 PROCEDURE — 87070 CULTURE OTHR SPECIMN AEROBIC: CPT

## 2023-04-15 PROCEDURE — 83880 ASSAY OF NATRIURETIC PEPTIDE: CPT

## 2023-04-15 PROCEDURE — 83690 ASSAY OF LIPASE: CPT

## 2023-04-15 PROCEDURE — 85027 COMPLETE CBC AUTOMATED: CPT

## 2023-04-15 PROCEDURE — 84295 ASSAY OF SERUM SODIUM: CPT

## 2023-04-15 PROCEDURE — 81001 URINALYSIS AUTO W/SCOPE: CPT

## 2023-04-15 PROCEDURE — 0DH67UZ INSERTION OF FEEDING DEVICE INTO STOMACH, VIA NATURAL OR ARTIFICIAL OPENING: ICD-10-PCS

## 2023-04-15 PROCEDURE — 3E043XZ INTRODUCTION OF VASOPRESSOR INTO CENTRAL VEIN, PERCUTANEOUS APPROACH: ICD-10-PCS

## 2023-04-15 PROCEDURE — 82150 ASSAY OF AMYLASE: CPT

## 2023-04-15 PROCEDURE — 87635 SARS-COV-2 COVID-19 AMP PRB: CPT

## 2023-04-15 PROCEDURE — 36600 WITHDRAWAL OF ARTERIAL BLOOD: CPT

## 2023-04-15 PROCEDURE — 96374 THER/PROPH/DIAG INJ IV PUSH: CPT

## 2023-04-15 PROCEDURE — 71275 CT ANGIOGRAPHY CHEST: CPT

## 2023-04-15 PROCEDURE — 86901 BLOOD TYPING SEROLOGIC RH(D): CPT

## 2023-04-15 PROCEDURE — 83735 ASSAY OF MAGNESIUM: CPT

## 2023-04-15 PROCEDURE — 80053 COMPREHEN METABOLIC PANEL: CPT

## 2023-04-15 PROCEDURE — 5A1955Z RESPIRATORY VENTILATION, GREATER THAN 96 CONSECUTIVE HOURS: ICD-10-PCS

## 2023-04-15 PROCEDURE — 4B02XTZ MEASUREMENT OF CARDIAC DEFIBRILLATOR, EXTERNAL APPROACH: ICD-10-PCS

## 2023-04-15 PROCEDURE — 85379 FIBRIN DEGRADATION QUANT: CPT

## 2023-04-15 PROCEDURE — 81003 URINALYSIS AUTO W/O SCOPE: CPT

## 2023-04-15 PROCEDURE — 36556 INSERT NON-TUNNEL CV CATH: CPT

## 2023-04-15 PROCEDURE — 71045 X-RAY EXAM CHEST 1 VIEW: CPT

## 2023-04-15 PROCEDURE — 86850 RBC ANTIBODY SCREEN: CPT

## 2023-04-15 PROCEDURE — 83036 HEMOGLOBIN GLYCOSYLATED A1C: CPT

## 2023-04-15 PROCEDURE — 84145 PROCALCITONIN (PCT): CPT

## 2023-04-15 PROCEDURE — 86900 BLOOD TYPING SEROLOGIC ABO: CPT

## 2023-04-15 PROCEDURE — 84100 ASSAY OF PHOSPHORUS: CPT

## 2023-04-15 PROCEDURE — 87205 SMEAR GRAM STAIN: CPT

## 2023-04-15 PROCEDURE — 87636 SARSCOV2 & INF A&B AMP PRB: CPT

## 2023-04-15 PROCEDURE — 96375 TX/PRO/DX INJ NEW DRUG ADDON: CPT

## 2023-04-15 PROCEDURE — 82330 ASSAY OF CALCIUM: CPT

## 2023-04-15 PROCEDURE — 71250 CT THORAX DX C-: CPT

## 2023-04-15 PROCEDURE — 85730 THROMBOPLASTIN TIME PARTIAL: CPT

## 2023-04-15 PROCEDURE — 85610 PROTHROMBIN TIME: CPT

## 2023-04-15 PROCEDURE — 94003 VENT MGMT INPAT SUBQ DAY: CPT

## 2023-04-15 RX ADMIN — INSULIN ASPART SCH: 100 INJECTION, SOLUTION INTRAVENOUS; SUBCUTANEOUS at 18:10

## 2023-04-15 RX ADMIN — PANTOPRAZOLE SODIUM SCH MG: 40 INJECTION, POWDER, FOR SOLUTION INTRAVENOUS at 15:38

## 2023-04-15 RX ADMIN — NOREPINEPHRINE BITARTRATE SCH MLS/HR: 1 INJECTION, SOLUTION, CONCENTRATE INTRAVENOUS at 09:55

## 2023-04-15 RX ADMIN — CHLORHEXIDINE GLUCONATE SCH ML: 1.2 RINSE ORAL at 21:16

## 2023-04-15 RX ADMIN — IPRATROPIUM BROMIDE AND ALBUTEROL SULFATE SCH: .5; 3 SOLUTION RESPIRATORY (INHALATION) at 17:15

## 2023-04-15 RX ADMIN — METRONIDAZOLE SCH MLS/HR: 500 INJECTION, SOLUTION INTRAVENOUS at 23:51

## 2023-04-15 RX ADMIN — ACETAMINOPHEN PRN MG: 325 TABLET, FILM COATED ORAL at 23:45

## 2023-04-15 RX ADMIN — APIXABAN SCH MG: 5 TABLET, FILM COATED ORAL at 17:22

## 2023-04-15 RX ADMIN — LEVETIRACETAM SCH MLS/HR: 15 INJECTION INTRAVENOUS at 21:16

## 2023-04-15 RX ADMIN — IPRATROPIUM BROMIDE AND ALBUTEROL SULFATE SCH ML: .5; 3 SOLUTION RESPIRATORY (INHALATION) at 20:18

## 2023-04-15 RX ADMIN — APIXABAN SCH MG: 5 TABLET, FILM COATED ORAL at 21:16

## 2023-04-15 NOTE — XR
EXAMINATION TYPE: XR chest 1V portable

 

DATE OF EXAM: 4/15/2023 10:08 AM

 

COMPARISON: Chest radiographs from 1/15/2023

 

TECHNIQUE: XR chest 1V portable Portable AP radiograph of the chest.

 

CLINICAL INDICATION:Female, 67 years old with history of chest pain; 

 

FINDINGS: 

Lungs/Pleura: No pneumothorax or pleural effusion. Right basilar patchy airspace opacity. 

Pulmonary vascularity: Pulmonary vascular congestion.

Heart/mediastinum: Cardiomediastinal silhouette is enlarged and stable. Three lead cardiac conduction
 device overlying the left hemithorax with lead tips projecting over the right ventricle, right atriu
m and coronary sinus.

Musculoskeletal: No acute osseous pathology. Left shoulder prosthesis.

 

Other findings: None

 

Lines/Tubes:

Endotracheal tube with distal tip 3.3 cm above the guzman

Possible NG tube terminating in the midesophagus.

 

IMPRESSION: 

1.  Cardiomegaly and mild pulmonary vascular congestion. Correlate with BNP for congestive heart fail
ure. Right basilar patchy airspace opacity which may represent pulmonary edema versus infiltrate.

2.  Endotracheal tube in appropriate position.

3.  Possible NG tube terminating in the mid esophagus. Clinical correlation is recommended.

## 2023-04-15 NOTE — P.CNPUL
History of Present Illness


Consult date: 04/15/23


Requesting physician: Rishi Mart


Reason for consult: other


Chief complaint: Out-of-hospital cardiopulmonary arrest.


History of present illness: 





Pulmonary/critical care consult dated 04/15/2023.





67-year-old female who apparently had a cardiopulmonary arrest, at a local 

department store/[employer].  The patient apparently had a downtime of about 15 

minutes, for there was cardiopulmonary resuscitation and return of spontaneous 

circulation.  The patient was seen in the ER, by the ER physician, Dr. Pennington.  

The patient was vented, and a right femoral vein triple lumen catheter was 

placed.  Family members are in the room, we selected see the patient.  The 

patient herself is unresponsive.  She has a orally placed endotracheal tube.  

She's currently on the ventilator, with vent settings of volume assist control, 

rate 20, tidal volume 375, FiO2 100%, 5.  Blood gases show pO2 75, pCO2 of 53, 

and a pH is 7.17.  The patient is on norepinephrine at 0.15 mcg/kg/m, and 

propofol at 15 mcg/kg/m.  The patient has a history of COPD from secondhand 

tobacco exposure, a previous history of the fibrillator placement for 

cardiomyopathy, and history of stroke, and Anderson filter placement.  In 

addition, the patient is on home O2, that she is supposed to use all the time, 

but she only uses as needed.  She was a smoker in the distant past.  White count

9.7, hemoglobin 11.4, hematocrit 37.9, and platelet count was normal.  D-dimer 

was 3.10.  Sodium 137, potassium 4.3, chlorides 104, CO2 17, anion gap 16, BUN 

and creatinine 12 and 0.76.  Troponin was 0.022 and N-terminal proBNP was 2290. 

Lactate was not measured but is probably elevated.  Testing for influenza A, and

B, RSV, and coronavirus are all negative.  Chest x-ray shows evidence of 

cardiomegaly, fluid overload, and an appropriately placed endotracheal tube.  

Computed tomography scan of the brain showed nothing acute.  CT angiogram was 

negative for PE.  There also may be some right lower lobe consolidation 

consistent with prior aspiration.





Review of Systems





REVIEW OF SYSTEMS:  The patient's review of systems cannot be obtained, given 

her cardiac arrest situation.


CONSTITUTIONAL:  [Negative.]


NEUROLOGIC: [ Negative.]


HEENT:  [ Negative.]


CARDIAC:  [Negative.]


PULMONARY:  [Negative.]


GI:  [Negative.]


:  [Negative.]


RHEUMATOLOGIC: [ Negative.]


IMMUNOLOGIC: [ Negative.]


ENDOCRINE:  [Negative.  ] 


DERMATOLOGIC: [Negative.]








Past Medical History


Past Medical History: Atrial Fibrillation, COPD, Hypertension


Additional Past Medical History / Comment(s): at home O2


History of Any Multi-Drug Resistant Organisms: None Reported


Past Surgical History: No Surgical Hx Reported


Additional Past Surgical History / Comment(s): hang filter, left rotator 

cuff, RTKR.


Past Anesthesia/Blood Transfusion Reactions: No Reported Reaction


Type of Cardiac Device: Permanent Pacemaker, AICD


Device Placement Date:: 2002


Past Psychological History: Anxiety, Depression


Smoking Status: Never smoker


Past Alcohol Use History: None Reported, Occasional


Past Drug Use History: None Reported





- Past Family History


  ** Mother


Family Medical History: Cancer, Coronary Artery Disease (CAD)


Additional Family Medical History / Comment(s): urterine CA





Medications and Allergies


                                Home Medications











 Medication  Instructions  Recorded  Confirmed  Type


 


ARIPiprazole [Abilify] 2 mg PO DAILY 01/10/23 01/10/23 History


 


Alendronate Sodium [Fosamax] 70 mg PO Q7D 01/10/23 01/10/23 History


 


Apixaban [Eliquis] 5 mg PO BID 01/10/23 01/10/23 History


 


Atorvastatin [Lipitor] 40 mg PO HS 01/10/23 01/10/23 History


 


Carbidopa/Levodopa [Sinemet  1 tab PO BID 01/10/23 01/10/23 History





mg Tablet]    


 


Dicyclomine [Bentyl] 10 mg PO QID PRN 01/10/23 01/10/23 History


 


Furosemide [Lasix] 40 mg PO DAILY 01/10/23 01/10/23 History


 


Isosorbide Mononitrate ER [Imdur] 60 mg PO DAILY 01/10/23 01/10/23 History


 


Liothyronine Sodium [Cytomel] 25 mcg PO DAILY 01/10/23 01/10/23 History


 


Nitroglycerin Sl Tabs [Nitrostat] 0.4 mg SL Q5M PRN 01/10/23 01/10/23 History


 


Sertraline [Zoloft] 200 mg PO DAILY 01/10/23 01/10/23 History


 


carvediloL [Coreg] 3.125 mg PO BID 01/10/23 01/10/23 History


 


traZODone  mg PO HS 01/10/23 01/10/23 History


 


traZODone HCL [Desyrel] 100 mg PO DAILY@0400 01/10/23 01/10/23 History


 


Magnesium Hydroxide [Milk of 2,400 mg PO DAILY PRN  ml 01/13/23  Rx





Magnesia Concentrate]    


 


HYDROcodone/APAP 5-325MG [Norco 1 tab PO Q6HR PRN 3 Days #12 tab 01/19/23  Rx





5-325]    


 


Ipratropium-Albuterol Nebulize 3 ml INHALATION TID #100 each 01/19/23  Rx





[Duoneb 0.5 mg-3 mg/3 ml Soln]    








                                    Allergies











Allergy/AdvReac Type Severity Reaction Status Date / Time


 


cephalexin [From Keflex] Allergy  Mouth Verified 01/11/23 11:02





   swelling  














Physical Exam


Osteopathic Statement: *.  No significant issues noted on an osteopathic 

structural exam other than those noted in the History and Physical/Consult.


Vitals: 


                                   Vital Signs











  Temp Pulse Resp BP Pulse Ox FiO2


 


 04/15/23 12:17   82  18  83/48  


 


 04/15/23 12:11   85  20  76/42  95 


 


 04/15/23 11:30   89  16  78/42  94 L 


 


 04/15/23 11:20   88  16  52/27  95 


 


 04/15/23 10:10   96  18  124/57  97 


 


 04/15/23 10:05   92  18  114/50  99 


 


 04/15/23 10:01   92  16  70/30  100 


 


 04/15/23 09:55   92  16  60/24  99 


 


 04/15/23 09:50   92  16  48/20  98  100


 


 04/15/23 09:45   96  16  60/42  99  100


 


 04/15/23 09:40  98.7 F  96  18  57/42  








                                Intake and Output











 04/14/23 04/15/23 04/15/23





 22:59 06:59 14:59


 


Intake Total   4.260


 


Balance   4.260


 


Intake:   


 


  Intake, IV Titration   4.260





  Amount   


 


    Norepinephrine 32 mg In   4.260





    Sodium Chloride 0.9% 218   





    ml @ 0.03 MCG/KG/MIN 1.   





    148 mls/hr IV .Q24H [facility]   





    Rx#:[id number]   


 


Other:   


 


  Weight   81.647 kg














No acute distress, sedated on propofol, with an orally placed endotracheal tube.





HEENT examination is grossly unremarkable.  





Neck supple.  Full range of motion.  No adenopathy thyromegaly or neck vein 

distention.





Cardiovascular examination reveals regular rhythm rate.  S1-S2 normal.  No S3 or

S4.  No discernible murmur noted.  Heart rate 82 bpm.





Lungs reveal scattered rhonchi.  No wheezes or crackles.  Breath sounds equal.





Abdomen obese, without bowel sounds.





Extremities are intact.  No cyanosis clubbing or edema.





Skin is without rash or lesion.





Neurologic examination cannot be adequately assessed at this time.





Results





- Laboratory Findings


CBC and BMP: 


                                 04/15/23 10:02





                                 04/15/23 10:02


ABG











ABG pH  7.17  (7.35-7.45)  L*  04/15/23  12:10    


 


ABG pCO2  53 mmHg (35-45)  H  04/15/23  12:10    


 


ABG pO2  75 mmHg ()  L  04/15/23  12:10    


 


ABG O2 Saturation  91.4 % (94-97)  L  04/15/23  12:10    





PT/INR, D-dimer











PT  11.1 sec (9.0-12.0)   04/15/23  10:02    


 


INR  1.1  (<1.2)   04/15/23  10:02    


 


D-Dimer  3.10 mg/L FEU (<0.60)  H  04/15/23  10:02    








Abnormal lab findings: 


                                  Abnormal Labs











  04/15/23 04/15/23 04/15/23





  10:02 10:02 10:02


 


RBC  3.53 L  


 


MCV  107.2 H  


 


MCHC  30.0 L  


 


D-Dimer   3.10 H 


 


ABG pH   


 


ABG pCO2   


 


ABG pO2   


 


ABG HCO3   


 


ABG O2 Saturation   


 


Carbon Dioxide    17 L


 


Glucose    325 H


 


AST    57 H














  04/15/23





  12:10


 


RBC 


 


MCV 


 


MCHC 


 


D-Dimer 


 


ABG pH  7.17 L*


 


ABG pCO2  53 H


 


ABG pO2  75 L


 


ABG HCO3  19 L


 


ABG O2 Saturation  91.4 L


 


Carbon Dioxide 


 


Glucose 


 


AST 














- Diagnostic Findings


Chest x-ray: image reviewed


CT scan - chest: image reviewed





Assessment and Plan


Assessment: 





Status post out-of-hospital cardiopulmonary arrest, with at least 15 minutes of 

downtime, before cardiopulmonary resuscitation and return of spontaneous 

circulation, were accomplished.





Status post intubation and mechanical ventilation for cardiopulmonary arrest, 

04/15/2023.





Rule out anoxic brain injury.





History of atrial fibrillation.





Status post AICD placement.





History of COPD.





History of hypertension.





History of CVA.





Prior history of Hang filter placement.





History of anxiety/depression.








Plan: 





Plan dated 04/15/2023.





Labs, x-rays, medications are reviewed.  The patient is seen in the emergency 

room, trauma 2.  We will await the patient to arrive in the intensive care unit.

 I did speak to the family about the fact that there may be some concerns of 

anoxic brain injury, given the 15 minute downtime at [facility].  Anyway, the 

Indocin the next 24 hours will be critical.  Labs, x-rays, and medications are 

reviewed.  We will adjust the ventilator, and make additional recommendations 

along the way.  Prognosis is very guarded at this point.


Time with Patient: Greater than 30

## 2023-04-15 NOTE — P.CRDCN
History of Present Illness


History of present illness: 





HISTORY OF PRESENTING ILLNESS


Patient is a pleasant 67-year-old female with history of CAD status post prior 

PCI, hypertension, hyperlipidemia, AICD, obesity, paroxysmal atrial 

fibrillation.  She follows in the office with Dr. Keene from [facility].  

Patient currently intubated and history obtained from family.  Apparently she 

had been in her normal state of health and I gone shopping at [employer].  She was 

using a automatic cart and apparently staff found her more short of breath and 

struggling for air.  EMS was called and found patient unresponsive and CPR 

initiated at around 9:10 AM.  Per chart apparently there was around 15 minutes 

of downtime.  AICD was interrogated with episodes of PMT and nonsustained VT 

lasting for 1-1/2 minutes requiring ATP however no shocks delivered.  These 

episodes occurred at 8am per interrogation. Full strips not available for 

reference.  Per chart patient did not require any defibrillators during the 

code.  Per family patient had been in her normal state of health and had been 

dealing with chronic shortness breath and might have felt somewhat short of 

breath last a however nothing overly out of the ordinary.





Patient had ETT tube placed and currently on 80% FiO2 and remains on 

norepinephrine with ability to come down somewhat on the norepinephrine.  

Initial troponin 0.02 and then 0.06.  White blood cell count 9.7, hemoglobin 

11.4, proBNP 2290.  CTA showed no PE, bilateral lower lobe and right upper lobe 

consolidation possibly related to pneumonia versus aspiration as well as rib 

fractures.





REVIEW OF SYSTEMS


At the time of my exam:


Unable to obtain secondary to intubation 





PHYSICAL EXAMINATION


Vital signs reviewed.


CONSTITUTIONAL: No apparent distress, sedated, unresponsive on ventilator. 


HEENT: Head is normocephalic. Pupils are equal, round. Sclerae anicteric. Mucous

membranes of the mouth are moist.  No JVD. No carotid bruit.


CHEST EXAMINATION: Lungs are clear to auscultation. No chest wall tenderness is 

noted on palpation or with deep breathing. 


HEART EXAMINATION: Regular rate and rhythm. S1, S2 heard. No murmurs, gallops or

rub.


ABDOMEN: Soft, nontender. Positive bowel sounds.


EXTREMITIES: 2+ peripheral pulses, no lower extremity edema and no calf 

tenderness.


NEUROLOGIC EXAMINATION: Patient is unresponsive on ventilator





ASSESSMENT


1.  Status post cardiac arrest with approximately 15 minutes of downtime


2.  Episodes of nonsustained VT requiring ATP however occurring one hour prior 

to cardiac arrest.  Unclear if true nonsustained VT or inappropriate ATP of A. 

fib.  Await full interrogation


3.  Non-STEMI related to cardiac arrest


4.  Unresponsive, altered mental status rule out anoxic brain injury


5.  CAD with prior PCI


6.  Paroxysmal atrial fibrillation


7.  Hypertension, currently hypotensive on pressors





PLAN


Patient with a cardiac arrest of unclear etiology.  Patient complaining of 

shortness of breath prior to episode and possible component of heart failure.  

Additionally AICD interrogation does show episode of ATP however per timing this

appeared to have occurred an hour before her code.  May have been related to A. 

fib with RVR however await full interrogation to analyze strip.  Continue s

upportive care.  Monitor neurologic progress.  Continue to trend troponins.  

Check 2-D echo.  Further recommendations to follow.











Past Medical History


Past Medical History: Atrial Fibrillation, COPD, Hypertension


Additional Past Medical History / Comment(s): at home O2


History of Any Multi-Drug Resistant Organisms: None Reported


Past Surgical History: No Surgical Hx Reported


Additional Past Surgical History / Comment(s): hang filter, left rotator 

cuff, RTKR.


Past Anesthesia/Blood Transfusion Reactions: No Reported Reaction


Type of Cardiac Device: Permanent Pacemaker, AICD


Device Placement Date:: 2002


Past Psychological History: Anxiety, Depression


Smoking Status: Never smoker


Past Alcohol Use History: None Reported, Occasional


Past Drug Use History: None Reported





- Past Family History


  ** Mother


Family Medical History: Cancer, Coronary Artery Disease (CAD)


Additional Family Medical History / Comment(s): urterine CA





Medications and Allergies


                                Home Medications











 Medication  Instructions  Recorded  Confirmed  Type


 


ARIPiprazole [Abilify] 2 mg PO DAILY 01/10/23 04/15/23 History


 


Alendronate Sodium [Fosamax] 70 mg PO Q7D 01/10/23 04/15/23 History


 


Apixaban [Eliquis] 5 mg PO BID 01/10/23 04/15/23 History


 


Atorvastatin [Lipitor] 40 mg PO HS 01/10/23 04/15/23 History


 


Carbidopa/Levodopa [Sinemet  1 tab PO BID 01/10/23 04/15/23 History





mg Tablet]    


 


Dicyclomine [Bentyl] 10 mg PO QID PRN 01/10/23 04/15/23 History


 


Furosemide [Lasix] 40 mg PO DAILY 01/10/23 04/15/23 History


 


Isosorbide Mononitrate ER [Imdur] 60 mg PO DAILY 01/10/23 04/15/23 History


 


Liothyronine Sodium [Cytomel] 25 mcg PO DAILY 01/10/23 04/15/23 History


 


Nitroglycerin Sl Tabs [Nitrostat] 0.4 mg SL Q5M PRN 01/10/23 04/15/23 History


 


Sertraline [Zoloft] 200 mg PO DAILY 01/10/23 04/15/23 History


 


carvediloL [Coreg] 3.125 mg PO BID 01/10/23 04/15/23 History


 


traZODone  mg PO HS 01/10/23 04/15/23 History


 


traZODone HCL [Desyrel] 100 mg PO DAILY@0400 01/10/23 04/15/23 History


 


lisinopriL [Zestril] 5 mg PO DAILY 04/15/23 04/15/23 History








                                    Allergies











Allergy/AdvReac Type Severity Reaction Status Date / Time


 


cephalexin [From Keflex] Allergy  Mouth Verified 04/15/23 15:18





   swelling  














Physical Exam


Vitals: 


                                   Vital Signs











  Temp Pulse Resp BP Pulse Ox FiO2


 


 04/15/23 22:00   97  20  123/60  95 


 


 04/15/23 21:45   93  20  129/58  96 


 


 04/15/23 21:30   93  20  121/61  94 L 


 


 04/15/23 21:15   92  20  126/56  98 


 


 04/15/23 21:00   90  20  117/55  100 


 


 04/15/23 20:45   85  20  120/53  98 


 


 04/15/23 20:30   84  20  116/61  98 


 


 04/15/23 20:23   85    


 


 04/15/23 20:15   85  20  115/54  100 


 


 04/15/23 20:14       60


 


 04/15/23 20:00  102.3 F H  85  21  112/61  99  60


 


 04/15/23 19:00   79  20  119/56  


 


 04/15/23 18:50   70  20  115/56  


 


 04/15/23 18:40   72  20  120/55  99 


 


 04/15/23 18:30   80  20  117/63  


 


 04/15/23 18:20   76  22  120/49  


 


 04/15/23 18:10   76  20  118/49  


 


 04/15/23 18:00   70  20  122/63  


 


 04/15/23 17:50   79  20  82/59  


 


 04/15/23 17:40   84  20  117/61  98 


 


 04/15/23 17:30   80  20  118/43  98 


 


 04/15/23 17:20   81  20  110/67  98 


 


 04/15/23 17:10   82  20  108/62  98 


 


 04/15/23 17:00   82  20  107/63  98 


 


 04/15/23 16:50   84  20  109/58  97 


 


 04/15/23 16:40   84  20  111/59  97 


 


 04/15/23 16:30   81  20  110/49  99 


 


 04/15/23 16:20   81  20  109/51  99 


 


 04/15/23 16:10   73  20  114/49  


 


 04/15/23 16:00   82  20  111/51  98  80


 


 04/15/23 15:55  98.4 F  82  20  112/50  98  80


 


 04/15/23 15:50   82  20  116/55  98 


 


 04/15/23 15:45   83  20  110/49  98 


 


 04/15/23 15:40   80  20  105/51  97 


 


 04/15/23 15:35   84  20  109/51  


 


 04/15/23 15:30   84  20  112/55  


 


 04/15/23 15:25   73  20  93/59  98 


 


 04/15/23 15:20   80  20  110/48  99 


 


 04/15/23 15:19       80


 


 04/15/23 15:15   75  20  102/79  99 


 


 04/15/23 15:10   76  20  124/47  99 


 


 04/15/23 15:05   78  20  118/57  99 


 


 04/15/23 15:00   78  20  101/62  99 


 


 04/15/23 14:55   78  20  112/54  99 


 


 04/15/23 14:50   81  20  117/50  99 


 


 04/15/23 14:45   84  20  129/50  99 


 


 04/15/23 14:40   75  20  124/72  98 


 


 04/15/23 14:35   72  20  146/73  98 


 


 04/15/23 14:00  94.4 F L  70  20  151/74  100  100


 


 04/15/23 13:40   88  16  141/72  100 


 


 04/15/23 13:29       100


 


 04/15/23 13:02   78  18  136/66  99 


 


 04/15/23 12:17   82  18  83/48  


 


 04/15/23 12:11   85  20  76/42  95 


 


 04/15/23 11:30   89  16  78/42  94 L 


 


 04/15/23 11:20   88  16  52/27  95 


 


 04/15/23 10:10   96  18  124/57  97 


 


 04/15/23 10:05   92  18  114/50  99 


 


 04/15/23 10:01   92  16  70/30  100 


 


 04/15/23 09:55   92  16  60/24  99 


 


 04/15/23 09:50   92  16  48/20  98  100


 


 04/15/23 09:45   96  16  60/42  99  100


 


 04/15/23 09:40  98.7 F  96  18  57/42  








                                Intake and Output











 04/15/23 04/15/23 04/16/23





 14:59 22:59 06:59


 


Intake Total 120.388 924.313 


 


Output Total 40 317 


 


Balance 80.388 607.313 


 


Intake:   


 


  IV  250 


 


    Sodium Chloride 0.9% 1,  150 





    000 ml @ 75 mls/hr IV .   





    [id number] STA Rx#:[id number]   


 


    levETIRAcetam IV 1,500 mg  100 





    In Saline 1 100ml.bag @   





    400 mls/hr IVPB ONCE STA   





    Rx#:[id number]   


 


  Intake, IV Titration 120.388 674.313 





  Amount   


 


    Levofloxacin 750Mg-D5w  150 





    Pmx 750 mg In Dextrose/   





    Water 1 150ml.bag @ 100   





    mls/hr IVPB ONCE STA Rx#:   





    [id number]   


 


    Norepinephrine 32 mg In 15.996 11.264 





    Sodium Chloride 0.9% 218   





    ml @ 0.03 MCG/KG/MIN 1.   





    148 mls/hr IV .Q24H MIKHAIL   





    Rx#:[id number]   


 


    Sodium Chloride 0.9% 1, 75 375 





    000 ml @ 75 mls/hr IV .   





    [id number] STA Rx#:[id number]   


 


    levETIRAcetam IV 1,500 mg  100 





    In Saline 1 100ml.bag @   





    400 mls/hr IVPB Q12HR MIKHAIL   





    Rx#:[id number]   


 


    propofoL 1,000 mg In 29.392 38.049 





    Empty Bag 1 bag @ 15 MCG/   





    KG/MIN 7.348 mls/hr IV .   





    [id number] MIKHAIL Rx#:[id number]   


 


Output:   


 


  Urine 40 317 


 


Other:   


 


  Voiding Method  Indwelling Catheter 


 


  Weight 81.647 kg  














Results





                                 04/15/23 10:02





                                 04/15/23 10:02


                                 Cardiac Enzymes











  04/15/23 04/15/23 04/15/23 Range/Units





  10:02 10:02 15:24 


 


AST  57 H    (14-36)  U/L


 


Troponin I   0.022  0.069 H*  (0.000-0.034)  ng/mL








                                   Coagulation











  04/15/23 Range/Units





  10:02 


 


PT  11.1  (9.0-12.0)  sec


 


APTT  23.9  (22.0-30.0)  sec








                                       CBC











  04/15/23 Range/Units





  10:02 


 


WBC  9.7  (3.8-10.6)  k/uL


 


RBC  3.53 L  (3.80-5.40)  m/uL


 


Hgb  11.4  (11.4-16.0)  gm/dL


 


Hct  37.9  (34.0-46.0)  %


 


Plt Count  246  (150-450)  k/uL








                          Comprehensive Metabolic Panel











  04/15/23 Range/Units





  10:02 


 


Sodium  137  (137-145)  mmol/L


 


Potassium  4.3  (3.5-5.1)  mmol/L


 


Chloride  104  ()  mmol/L


 


Carbon Dioxide  17 L  (22-30)  mmol/L


 


BUN  12  (7-17)  mg/dL


 


Creatinine  0.76  (0.52-1.04)  mg/dL


 


Glucose  325 H  (74-99)  mg/dL


 


Calcium  8.6  (8.4-10.2)  mg/dL


 


AST  57 H  (14-36)  U/L


 


ALT  14  (4-34)  U/L


 


Alkaline Phosphatase  73  ()  U/L


 


Total Protein  6.7  (6.3-8.2)  g/dL


 


Albumin  3.8  (3.5-5.0)  g/dL








                               Current Medications











Generic Name Dose Route Start Last Admin





  Trade Name Freq  PRN Reason Stop Dose Admin


 


Albuterol/Ipratropium  3 ml  04/15/23 10:01 





  Ipratropium-Albuterol 3 Ml Neb  INHALATION  





  RT-Q2H PRN  





  Shortness Of Breath Or Wheezing  


 


Albuterol/Ipratropium  3 ml  04/15/23 16:00  04/15/23 20:18





  Ipratropium-Albuterol 3 Ml Neb  INHALATION   3 ml





  RT-QID MIKHAIL   Administration


 


Albuterol/Ipratropium  3 ml  04/15/23 12:39 





  Ipratropium-Albuterol 3 Ml Neb  INHALATION  





  RT-Q4H PRN  





  Shortness Of Breath Or Wheezing  


 


Apixaban  5 mg  04/15/23 16:37  04/15/23 21:16





  Apixaban 5 Mg Tab  PO   5 mg





  BID MIKHAIL   Administration





  Protocol  


 


Chlorhexidine Gluconate  15 ml  04/15/23 21:00  04/15/23 21:16





  Chlorhexidine Gluconate 15 Ml Cup  MUCOUS MEM   15 ml





  BID MIKHAIL   Administration


 


Dextrose/Water  25 ml  04/15/23 13:46 





  Dextrose 50% Syringe 50 Ml  IVP  





  PER PROTOCOL PRN  





  Hypoglycemia  





  Protocol  


 


Dextrose/Water  50 ml  04/15/23 13:46 





  Dextrose 50% Syringe 50 Ml  IVP  





  PER PROTOCOL PRN  





  Hypoglycemia  





  Protocol  


 


Norepinephrine Bitartrate 32  250 mls @ 1.148 mls/hr  04/15/23 09:45  04/15/23 

19:06





  mg/ Sodium Chloride  IV   0.05 mcg/kg/min





  .Q24H MIKHAIL   1.914 mls/hr





    Titration





  Protocol  





  0.03 MCG/KG/MIN  


 


Sodium Chloride  1,000 mls @ 75 mls/hr  04/15/23 09:58  04/15/23 10:23





  Saline 0.9%  IV  04/15/23 23:17  75 mls/hr





  .[id number] STA   Administration


 


Propofol 1,000 mg/ IV Solution  100 mls @ 7.348 mls/hr  04/15/23 10:15  04/15/23

18:17





  IV   20 mcg/kg/min





  .[id number] MIKHAIL   9.798 mls/hr





    Administration





  Protocol  





  15 MCG/KG/MIN  


 


Levetiracetam 1,500 mg/ IV  100 mls @ 400 mls/hr  04/15/23 21:00  04/15/23 21:16





  Solution  IVPB   400 mls/hr





  Q12HR MIKHAIL   Administration


 


Metronidazole 500 mg/ IV  100 mls @ 100 mls/hr  04/16/23 00:00 





  Solution  IVPB  





  Q8HR [facility]  





  Protocol  


 


Insulin Aspart  0 unit  04/15/23 18:00  04/15/23 18:10





  Insulin Aspart (Novolog) 100 Unit/Ml Vial  SQ   Not Given





  Q6H MIKHAIL  





  Protocol  


 


Levofloxacin  750 mg  04/16/23 16:00 





  Levofloxacin 750 Mg Tab  PO  04/19/23 16:01 





  DAILY@1600 [facility]  





  Protocol  


 


Lorazepam  2 mg  04/15/23 10:01  04/15/23 18:05





  Lorazepam 2 Mg/Ml Inj  IV   2 mg





  Q1HR PRN   Administration





  Severe Agitation  


 


Lorazepam  1 mg  04/15/23 10:01 





  Lorazepam 2 Mg/Ml Inj  IV  





  Q1HR PRN  





  Mild Agitation  


 


Miscellaneous Information  1 each  04/15/23 12:39 





  Pneumonia Protocol Utilized 1 Each Misc  PO  





  ONCE PRN  





  Per Protocol  


 


Naloxone HCl  0.2 mg  04/15/23 12:39 





  Naloxone 0.4 Mg/Ml 1 Ml Vial  IV  





  Q2M PRN  





  Opioid Reversal  


 


Pantoprazole Sodium  40 mg  04/15/23 12:45  04/15/23 15:38





  Pantoprazole 40 Mg/10 Ml Vial  IV   40 mg





  DAILY MIKHAIL   Administration








                                Intake and Output











 04/15/23 04/15/23 04/16/23





 14:59 22:59 06:59


 


Intake Total 120.388 924.313 


 


Output Total 40 317 


 


Balance 80.388 607.313 


 


Intake:   


 


  IV  250 


 


    Sodium Chloride 0.9% 1,  150 





    000 ml @ 75 mls/hr IV .   





    [id number] STA Rx#:[id number]   


 


    levETIRAcetam IV 1,500 mg  100 





    In Saline 1 100ml.bag @   





    400 mls/hr IVPB ONCE STA   





    Rx#:[id number]   


 


  Intake, IV Titration 120.388 674.313 





  Amount   


 


    Levofloxacin 750Mg-D5w  150 





    Pmx 750 mg In Dextrose/   





    Water 1 150ml.bag @ 100   





    mls/hr IVPB ONCE STA Rx#:   





    [id number]   


 


    Norepinephrine 32 mg In 15.996 11.264 





    Sodium Chloride 0.9% 218   





    ml @ 0.03 MCG/KG/MIN 1.   





    148 mls/hr IV .Q24H MIKHAIL   





    Rx#:[id number]   


 


    Sodium Chloride 0.9% 1, 75 375 





    000 ml @ 75 mls/hr IV .   





    [id number] STA Rx#:[id number]   


 


    levETIRAcetam IV 1,500 mg  100 





    In Saline 1 100ml.bag @   





    400 mls/hr IVPB Q12HR MIKHAIL   





    Rx#:[id number]   


 


    propofoL 1,000 mg In 29.392 38.049 





    Empty Bag 1 bag @ 15 MCG/   





    KG/MIN 7.348 mls/hr IV .   





    [id number] MIKHAIL Rx#:[id number]   


 


Output:   


 


  Urine 40 317 


 


Other:   


 


  Voiding Method  Indwelling Catheter 


 


  Weight 81.647 kg  








                                 Patient Weight











 04/16/23





 06:59


 


Weight 81.647 kg








                                        





                                 04/15/23 10:02 





                                 04/15/23 10:02

## 2023-04-15 NOTE — ED
General Adult HPI





- General


Chief complaint: Cardiac Arrest/CPR


Stated complaint: Cardiac Arrest


Time Seen by Provider: 04/15/23 09:37


Source: EMS, RN notes reviewed


Mode of arrival: EMS


Limitations: altered mental status, physical limitation





- History of Present Illness


Initial comments: 





Patient is a 67-year-old female presenting to the emergency department with 

cardiac arrest.  Patient was picked up from [facility] where she was found 

unresponsive on a motorized scooter.  Patient remains unresponsive on arrival 

and provides no history.  History from EMS.  Patient did report that she has 

cardiac history prior to becoming unresponsive.  EMS did perform CPR for around 

15 minutes prior to arrival and did provide 3 epinephrine and intubated patient.

 Patient did have return of circulation upon arrival.





- Related Data


                                Home Medications











 Medication  Instructions  Recorded  Confirmed


 


ARIPiprazole [Abilify] 2 mg PO DAILY 01/10/23 01/10/23


 


Alendronate Sodium [Fosamax] 70 mg PO Q7D 01/10/23 01/10/23


 


Apixaban [Eliquis] 5 mg PO BID 01/10/23 01/10/23


 


Atorvastatin [Lipitor] 40 mg PO HS 01/10/23 01/10/23


 


Carbidopa/Levodopa [Sinemet  1 tab PO BID 01/10/23 01/10/23





mg Tablet]   


 


Dicyclomine [Bentyl] 10 mg PO QID PRN 01/10/23 01/10/23


 


Furosemide [Lasix] 40 mg PO DAILY 01/10/23 01/10/23


 


Isosorbide Mononitrate ER [Imdur] 60 mg PO DAILY 01/10/23 01/10/23


 


Liothyronine Sodium [Cytomel] 25 mcg PO DAILY 01/10/23 01/10/23


 


Nitroglycerin Sl Tabs [Nitrostat] 0.4 mg SL Q5M PRN 01/10/23 01/10/23


 


Sertraline [Zoloft] 200 mg PO DAILY 01/10/23 01/10/23


 


carvediloL [Coreg] 3.125 mg PO BID 01/10/23 01/10/23


 


traZODone  mg PO HS 01/10/23 01/10/23


 


traZODone HCL [Desyrel] 100 mg PO DAILY@0400 01/10/23 01/10/23








                                  Previous Rx's











 Medication  Instructions  Recorded


 


Magnesium Hydroxide [Milk of 2,400 mg PO DAILY PRN  ml 01/13/23





Magnesia Concentrate]  


 


HYDROcodone/APAP 5-325MG [Norco 1 tab PO Q6HR PRN 3 Days #12 tab 01/19/23





5-325]  


 


Ipratropium-Albuterol Nebulize 3 ml INHALATION TID #100 each 01/19/23





[Duoneb 0.5 mg-3 mg/3 ml Soln]  











                                    Allergies











Allergy/AdvReac Type Severity Reaction Status Date / Time


 


cephalexin [From Keflex] Allergy  Mouth Verified 01/11/23 11:02





   swelling  














Review of Systems


ROS Statement: 


Those systems with pertinent positive or pertinent negative responses have been 

documented in the HPI.





ROS Other: All systems not noted in ROS Statement are negative.


Limitations: ROS unobtainable due to patients medical condition





Past Medical History


Past Medical History: Atrial Fibrillation, COPD, Hypertension


Additional Past Medical History / Comment(s): at home O2


History of Any Multi-Drug Resistant Organisms: None Reported


Past Surgical History: No Surgical Hx Reported


Additional Past Surgical History / Comment(s): hang filter, left rotator 

cuff, RTKR.


Past Anesthesia/Blood Transfusion Reactions: No Reported Reaction


Type of Cardiac Device: Permanent Pacemaker, AICD


Device Placement Date:: 2002


Past Psychological History: Anxiety, Depression


Smoking Status: Never smoker


Past Alcohol Use History: None Reported, Occasional


Past Drug Use History: None Reported





- Past Family History


  ** Mother


Family Medical History: Cancer, Coronary Artery Disease (CAD)


Additional Family Medical History / Comment(s): urterine CA





General Exam


Limitations: altered mental status, physical limitation


General appearance: other (Unresponsive)


Head exam: Present: atraumatic


Eye exam: Present: other (Pupils dilated and sluggish)


ENT exam: Present: normal oropharynx (Intubated)


Neck exam: Present: normal inspection


Respiratory exam: Present: other (No spontaneous breath sounds.  Breath sounds 

equal with bagging insufflation)


Cardiovascular Exam: Present: tachycardia, irregular rhythm


  ** Expanded


Peripheral pulses: 1+: Radial (R), Radial (L), Femoral (R), Femoral (L), Poste

rior Tibialis (R), Posterior Tibialis (L), Dorsalis Pedis (R), Dorsalis Pedis 

(L)


GI/Abdominal exam: Present: soft.  Absent: distended, tenderness


Extremities exam: Present: normal inspection


Neurological exam: Present: altered


  ** Expanded


Eye Response: (1) no response


Motor Response: (1) no motor response


Verbal Response: (1) no verbal response


Psychiatric exam: Present: other (Nonverbal)


Skin exam: Present: normal color





Course


                                   Vital Signs











  04/15/23 04/15/23 04/15/23





  09:40 09:45 09:50


 


Temperature 98.7 F  


 


Pulse Rate 96 96 92


 


Respiratory 18 16 16





Rate   


 


Blood Pressure 57/42 60/42 48/20


 


O2 Sat by Pulse  99 98





Oximetry   


 


Fraction of  100 100





Inspired Oxygen   





(FIO2)   














  04/15/23 04/15/23 04/15/23





  09:55 10:01 10:05


 


Temperature   


 


Pulse Rate 92 92 92


 


Respiratory 16 16 18





Rate   


 


Blood Pressure 60/24 70/30 114/50


 


O2 Sat by Pulse 99 100 99





Oximetry   


 


Fraction of   





Inspired Oxygen   





(FIO2)   














  04/15/23 04/15/23 04/15/23





  10:10 11:20 11:30


 


Temperature   


 


Pulse Rate 96 88 89


 


Respiratory 18 16 16





Rate   


 


Blood Pressure 124/57 52/27 78/42


 


O2 Sat by Pulse 97 95 94 L





Oximetry   


 


Fraction of   





Inspired Oxygen   





(FIO2)   














  04/15/23 04/15/23





  12:11 12:17


 


Temperature  


 


Pulse Rate 85 82


 


Respiratory 20 18





Rate  


 


Blood Pressure 76/42 83/48


 


O2 Sat by Pulse 95 





Oximetry  


 


Fraction of  





Inspired Oxygen  





(FIO2)  














EKG Findings





- EKG Results:


EKG: interpreted by ERMD ((Axis.  Nonspecific intraventricular conduction delay.

 Lateral ST depression.)


EKG shows: atrial fibrillation





Procedures





- ABG Interpretation


Ph: 7.16


PCO2: 53


PO2: 74


Bicarbonate: 19





- Central Line Placement


  ** Right Femoral


Consent Obtained: emergent situation


Patient Placed on Monitor/Pulse Ox: Yes


MD Prep: mask, gown, gloves


Central Line Prep: Chlorhexidine scrub


Ultrasound Used for Placement: No


Central Line Lumen Inserted: triple


Central Line Position: good blood return, all ports aspirated, flushed, capped, 

sutured in place with 3-0 nylon


Dressing Applied: Tegaderm


Patient Tolerated Procedure: well


Complications: none





Medical Decision Making





- Medical Decision Making





Was pt. sent in by a medical professional or institution (, PA, NP, urgent 

care, hospital, or nursing home...) When possible be specific


@  -Patient was sent in from [facility]


Did you speak to anyone other than the patient for history (EMS, parent, family,

police, friend...)? What history was obtained from this source 


@  -EMS provides history


Did you review nursing and triage notes (agree or disagree)?  Why? 


@  -I reviewed and agree with nursing and triage notes


Were old charts reviewed (outside hosp., previous admission, EMS record, old 

EKG, old radiological studies, urgent care reports/EKG's, nursing home records)?

Report findings 


@  -No old charts were reviewed


Differential Diagnosis (chest pain, altered mental status, abdominal pain women,

abdominal pain men, vaginal bleeding, weakness, fever, dyspnea, syncope, 

headache, dizziness, GI bleed, back pain, seizure, CVA, palpatations, mental 

health)? 


@  -not applicable


EKG interpreted by me (3pts min.).


@  -As above


X-rays interpreted by me (1pt min.).


@  -Chest x-ray shows diffuse fluffy infiltrates


CT interpreted by me (1pt min.).


@  -Reports reviewed


U/S interpreted by me (1pt. min.).


@  -None done


What testing was considered but not performed or refused? (CT, X-rays, U/S, 

labs)? Why?


@  -None


What meds were considered but not given or refused? Why?


@  -None


Did you discuss the management of the patient with other professionals ([facility] i.e. , PA, NP, lab, RT, psych nurse, , , teacher,

, )? Give summary


@  -Case was discussed with Dr. Billingsley, who will admit covering hospital call.  

Case also discussed with pulmonary Dr. Brannon will come evaluate and consult.


Was smoking cessation discussed for >3mins.?


@  -No


Was critical care preformed (if so, how long)?


@  -32 minutes critical care time


Were there social determinants of health that impacted care today? How? (H

omelessness, low income, unemployed, alcoholism, drug addiction, transportation,

low edu. Level, literacy, decrease access to med. care, [facility], rehab)?


@  -No


Was there de-escalation of care discussed even if they declined (Discuss DNR or 

withdrawal of care, Hospice)? DNR status


@  -No


What co-morbidities impacted this encounter? (DM, HTN, Smoking, COPD, CAD, 

Cancer, CVA, ARF, Chemo, Hep., AIDS, mental health diagnosis, sleep apnea, 

morbid obesity)?


@  -None


Was patient admitted / discharged? Hospital course, mention meds given and 

route, prescriptions, significant lab abnormalities, going to OR and other 

pertinent info.


@  -Patient will be admitted.  Family is present.  Family is updated.  Admission

orders started.  Patient is intubated on pressors with central line.  Blood 

pressure is still marginal.  Family updated on patient's poor condition.  

Patient has possible aspiration and will be covered with antibiotics and blood 

cultures will be drawn. 


Undiagnosed new problem with uncertain prognosis?


@  -No


Drug Therapy requiring intensive monitoring for toxicity (Heparin, Nitro, 

Insulin, Cardizem)?


@  -Patient is on the Levophed that will need to be monitored


Were any procedures done?


@  -Central line, see above


Diagnosis/symptom?


@  -Cardiac arrest


Acute, or Chronic, or Acute on Chronic?


@  -Acute


Uncomplicated (without systemic symptoms) or Complicated (systemic symptoms)?


@  -Complicated with aspiration


Side effects of treatment?


@  -No


Exacerbation, Progression, or Severe Exacerbation?


@  -No


Poses a threat to life or bodily function? How? (Chest pain, USA, MI, pneumonia,

PE, COPD, DKA, ARF, appy, cholecystitis, CVA, Diverticulitis, Homicidal, 

Suicidal, threat to staff... and all critical care pts)


@  -Cardiac arrest has potential for threat of life.  Patient also has 

associated aspiration pneumonia





- Lab Data


Result diagrams: 


                                 04/15/23 10:02





                                 04/15/23 10:02


                                   Lab Results











  04/15/23 04/15/23 04/15/23 Range/Units





  10:02 10:02 10:02 


 


WBC  9.7    (3.8-10.6)  k/uL


 


RBC  3.53 L    (3.80-5.40)  m/uL


 


Hgb  11.4    (11.4-16.0)  gm/dL


 


Hct  37.9    (34.0-46.0)  %


 


MCV  107.2 H    (80.0-100.0)  fL


 


MCH  32.2    (25.0-35.0)  pg


 


MCHC  30.0 L    (31.0-37.0)  g/dL


 


RDW  14.1    (11.5-15.5)  %


 


Plt Count  246    (150-450)  k/uL


 


MPV  8.9    


 


Neutrophils %  48    %


 


Lymphocytes %  45    %


 


Monocytes %  3    %


 


Eosinophils %  1    %


 


Basophils %  0    %


 


Neutrophils #  4.7    (1.3-7.7)  k/uL


 


Lymphocytes #  4.4    (1.0-4.8)  k/uL


 


Monocytes #  0.3    (0-1.0)  k/uL


 


Eosinophils #  0.1    (0-0.7)  k/uL


 


Basophils #  0.0    (0-0.2)  k/uL


 


Hypochromasia  Marked    


 


Macrocytosis  Moderate    


 


PT   11.1   (9.0-12.0)  sec


 


INR   1.1   (<1.2)  


 


APTT   23.9   (22.0-30.0)  sec


 


D-Dimer   3.10 H   (<0.60)  mg/L FEU


 


Sample Site     


 


ABG pH     (7.35-7.45)  


 


ABG pCO2     (35-45)  mmHg


 


ABG pO2     ()  mmHg


 


ABG HCO3     (21-25)  mmol/L


 


ABG Total CO2     (19-24)  mmol/L


 


ABG O2 Saturation     (94-97)  %


 


ABG Base Excess     mmol/L


 


Zachary Test     


 


FiO2     %


 


Sodium    137  (137-145)  mmol/L


 


Potassium    4.3  (3.5-5.1)  mmol/L


 


Chloride    104  ()  mmol/L


 


Carbon Dioxide    17 L  (22-30)  mmol/L


 


Anion Gap    16  mmol/L


 


BUN    12  (7-17)  mg/dL


 


Creatinine    0.76  (0.52-1.04)  mg/dL


 


Est GFR (CKD-EPI)AfAm    >90  (>60 ml/min/1.73 sqM)  


 


Est GFR (CKD-EPI)NonAf    81  (>60 ml/min/1.73 sqM)  


 


Glucose    325 H  (74-99)  mg/dL


 


Calcium    8.6  (8.4-10.2)  mg/dL


 


Magnesium    2.2  (1.6-2.3)  mg/dL


 


Total Bilirubin    0.7  (0.2-1.3)  mg/dL


 


AST    57 H  (14-36)  U/L


 


ALT    14  (4-34)  U/L


 


Alkaline Phosphatase    73  ()  U/L


 


Troponin I     (0.000-0.034)  ng/mL


 


NT-Pro-B Natriuret Pep     pg/mL


 


Total Protein    6.7  (6.3-8.2)  g/dL


 


Albumin    3.8  (3.5-5.0)  g/dL


 


Amylase    83  ()  U/L


 


Lipase    242  ()  U/L


 


Influenza Type A (PCR)     (Not Detectd)  


 


Influenza Type B (PCR)     (Not Detectd)  


 


RSV (PCR)     (Not Detectd)  


 


SARS-CoV-2 (PCR)     (Not Detectd)  














  04/15/23 04/15/23 04/15/23 Range/Units





  10:02 10:02 10:18 


 


WBC     (3.8-10.6)  k/uL


 


RBC     (3.80-5.40)  m/uL


 


Hgb     (11.4-16.0)  gm/dL


 


Hct     (34.0-46.0)  %


 


MCV     (80.0-100.0)  fL


 


MCH     (25.0-35.0)  pg


 


MCHC     (31.0-37.0)  g/dL


 


RDW     (11.5-15.5)  %


 


Plt Count     (150-450)  k/uL


 


MPV     


 


Neutrophils %     %


 


Lymphocytes %     %


 


Monocytes %     %


 


Eosinophils %     %


 


Basophils %     %


 


Neutrophils #     (1.3-7.7)  k/uL


 


Lymphocytes #     (1.0-4.8)  k/uL


 


Monocytes #     (0-1.0)  k/uL


 


Eosinophils #     (0-0.7)  k/uL


 


Basophils #     (0-0.2)  k/uL


 


Hypochromasia     


 


Macrocytosis     


 


PT     (9.0-12.0)  sec


 


INR     (<1.2)  


 


APTT     (22.0-30.0)  sec


 


D-Dimer     (<0.60)  mg/L FEU


 


Sample Site     


 


ABG pH     (7.35-7.45)  


 


ABG pCO2     (35-45)  mmHg


 


ABG pO2     ()  mmHg


 


ABG HCO3     (21-25)  mmol/L


 


ABG Total CO2     (19-24)  mmol/L


 


ABG O2 Saturation     (94-97)  %


 


ABG Base Excess     mmol/L


 


Zachary Test     


 


FiO2     %


 


Sodium     (137-145)  mmol/L


 


Potassium     (3.5-5.1)  mmol/L


 


Chloride     ()  mmol/L


 


Carbon Dioxide     (22-30)  mmol/L


 


Anion Gap     mmol/L


 


BUN     (7-17)  mg/dL


 


Creatinine     (0.52-1.04)  mg/dL


 


Est GFR (CKD-EPI)AfAm     (>60 ml/min/1.73 sqM)  


 


Est GFR (CKD-EPI)NonAf     (>60 ml/min/1.73 sqM)  


 


Glucose     (74-99)  mg/dL


 


Calcium     (8.4-10.2)  mg/dL


 


Magnesium     (1.6-2.3)  mg/dL


 


Total Bilirubin     (0.2-1.3)  mg/dL


 


AST     (14-36)  U/L


 


ALT     (4-34)  U/L


 


Alkaline Phosphatase     ()  U/L


 


Troponin I  0.022    (0.000-0.034)  ng/mL


 


NT-Pro-B Natriuret Pep   2290   pg/mL


 


Total Protein     (6.3-8.2)  g/dL


 


Albumin     (3.5-5.0)  g/dL


 


Amylase     ()  U/L


 


Lipase     ()  U/L


 


Influenza Type A (PCR)    Not Detected  (Not Detectd)  


 


Influenza Type B (PCR)    Not Detected  (Not Detectd)  


 


RSV (PCR)    Not Detected  (Not Detectd)  


 


SARS-CoV-2 (PCR)    Not Detected  (Not Detectd)  














  04/15/23 Range/Units





  12:10 


 


WBC   (3.8-10.6)  k/uL


 


RBC   (3.80-5.40)  m/uL


 


Hgb   (11.4-16.0)  gm/dL


 


Hct   (34.0-46.0)  %


 


MCV   (80.0-100.0)  fL


 


MCH   (25.0-35.0)  pg


 


MCHC   (31.0-37.0)  g/dL


 


RDW   (11.5-15.5)  %


 


Plt Count   (150-450)  k/uL


 


MPV   


 


Neutrophils %   %


 


Lymphocytes %   %


 


Monocytes %   %


 


Eosinophils %   %


 


Basophils %   %


 


Neutrophils #   (1.3-7.7)  k/uL


 


Lymphocytes #   (1.0-4.8)  k/uL


 


Monocytes #   (0-1.0)  k/uL


 


Eosinophils #   (0-0.7)  k/uL


 


Basophils #   (0-0.2)  k/uL


 


Hypochromasia   


 


Macrocytosis   


 


PT   (9.0-12.0)  sec


 


INR   (<1.2)  


 


APTT   (22.0-30.0)  sec


 


D-Dimer   (<0.60)  mg/L FEU


 


Sample Site  rrad  


 


ABG pH  7.17 L*  (7.35-7.45)  


 


ABG pCO2  53 H  (35-45)  mmHg


 


ABG pO2  75 L  ()  mmHg


 


ABG HCO3  19 L  (21-25)  mmol/L


 


ABG Total CO2  21  (19-24)  mmol/L


 


ABG O2 Saturation  91.4 L  (94-97)  %


 


ABG Base Excess  -9.3  mmol/L


 


Zachary Test  Yes  


 


FiO2  100  %


 


Sodium   (137-145)  mmol/L


 


Potassium   (3.5-5.1)  mmol/L


 


Chloride   ()  mmol/L


 


Carbon Dioxide   (22-30)  mmol/L


 


Anion Gap   mmol/L


 


BUN   (7-17)  mg/dL


 


Creatinine   (0.52-1.04)  mg/dL


 


Est GFR (CKD-EPI)AfAm   (>60 ml/min/1.73 sqM)  


 


Est GFR (CKD-EPI)NonAf   (>60 ml/min/1.73 sqM)  


 


Glucose   (74-99)  mg/dL


 


Calcium   (8.4-10.2)  mg/dL


 


Magnesium   (1.6-2.3)  mg/dL


 


Total Bilirubin   (0.2-1.3)  mg/dL


 


AST   (14-36)  U/L


 


ALT   (4-34)  U/L


 


Alkaline Phosphatase   ()  U/L


 


Troponin I   (0.000-0.034)  ng/mL


 


NT-Pro-B Natriuret Pep   pg/mL


 


Total Protein   (6.3-8.2)  g/dL


 


Albumin   (3.5-5.0)  g/dL


 


Amylase   ()  U/L


 


Lipase   ()  U/L


 


Influenza Type A (PCR)   (Not Detectd)  


 


Influenza Type B (PCR)   (Not Detectd)  


 


RSV (PCR)   (Not Detectd)  


 


SARS-CoV-2 (PCR)   (Not Detectd)  














Critical Care Time


Critical Care Time: Yes


Total Critical Care Time: 32





Disposition


Clinical Impression: 


 Cardiac arrest, Aspiration pneumonia, Hypotension





Disposition: ADMITTED AS IP TO THIS HOSP


Is patient prescribed a controlled substance at d/c from ED?: No


Referrals: 


None,Stated [Primary Care Provider] - 1-2 days


Time of Disposition: 12:37

## 2023-04-15 NOTE — P.HPIM
History of Present Illness


H&P Date: 04/15/23


Chief Complaint: Cardiac arrest





Patient is a 67-year-old male with a known history of chronic atrial 

fibrillation on anticoagulation with Eliquis, cardiomyopathy status post ICD 

placement, COPD on home oxygen at 4 L via nasal cannula, hypertension, 

anxiety/depression and history of femoral neck fracture s/p repair in January 2023 was brought to the hospital by EMS status postcardiac arrest.  Patient was 

at [facility] where she was found to have worsening shortness of breath and became 

unresponsive on a motorized scooter..  Patient underwent CPR for about 15 

minutes by EMS and was given 3 doses of epinephrine with return of spontaneous 

circulation and was intubated.  Patient was brought to ER for evaluation.  

Patient was unresponsive and was able to provide any history.


On arrival chest x-ray showed cardiomegaly and mild pulmonary vascular 

congestion.  Correlate with BNP for congestive heart failure.  Right basilar 

patchy airspace opacities may represent pulmonary edema versus infiltrate.  

Endotracheal tube in appropriate position.  Possible NG tube terminating in the 

mid esophagus.


CT head showed no acute intracranial process.  Paranasal sinus disease with air-

fluid level within the left maxillary sinus.  Correlate for acute sinusitis.


Chest CTA showed no evidence of PE.  Bilateral lower lobe and right upper lobe 

consolidation with a scattered multifocal groundglass opacities throughout the 

lungs.  Findings are compatible with pneumonia possibly aspiration.


Multiple acute minimally displaced rib fractures likely related to CPR in the 

setting of cardiac arrest.  Remote to subacute bilateral rib fractures also 

demonstrated.  No pneumothorax.  Cardiomegaly.


Laboratory data showed WBC 9.7 hemoglobin 11.4 platelets 246 and D-dimer 3.1


Initial ABG showed pH of 7.17 PCO2 30 and PO2 75


Sodium 135, potassium 4.3, chloride 104, bicarb is 17 BUN 12 and creatinine 0.76

and blood sugar was 325 and lactic acid 2.9 AST 57 ALT 14 alk phos 73 and 

troponin x1 negative


proBNP 2290


Influenza A B RSV and COVID-19 PCR not detected.








Review of Systems


ROS unobtainable: due to mental status





Past Medical History


Past Medical History: Atrial Fibrillation, COPD, Hypertension


Additional Past Medical History / Comment(s): at home O2


History of Any Multi-Drug Resistant Organisms: None Reported


Past Surgical History: No Surgical Hx Reported


Additional Past Surgical History / Comment(s): hang filter, left rotator 

cuff, RTKR.


Past Anesthesia/Blood Transfusion Reactions: No Reported Reaction


Type of Cardiac Device: Permanent Pacemaker, AICD


Device Placement Date:: 2002


Past Psychological History: Anxiety, Depression


Smoking Status: Never smoker


Past Alcohol Use History: None Reported, Occasional


Past Drug Use History: None Reported





- Past Family History


  ** Mother


Family Medical History: Cancer, Coronary Artery Disease (CAD)


Additional Family Medical History / Comment(s): urterine CA





Medications and Allergies


                                Home Medications











 Medication  Instructions  Recorded  Confirmed  Type


 


ARIPiprazole [Abilify] 2 mg PO DAILY 01/10/23 04/15/23 History


 


Alendronate Sodium [Fosamax] 70 mg PO Q7D 01/10/23 04/15/23 History


 


Apixaban [Eliquis] 5 mg PO BID 01/10/23 04/15/23 History


 


Atorvastatin [Lipitor] 40 mg PO HS 01/10/23 04/15/23 History


 


Carbidopa/Levodopa [Sinemet  1 tab PO BID 01/10/23 04/15/23 History





mg Tablet]    


 


Dicyclomine [Bentyl] 10 mg PO QID PRN 01/10/23 04/15/23 History


 


Furosemide [Lasix] 40 mg PO DAILY 01/10/23 04/15/23 History


 


Isosorbide Mononitrate ER [Imdur] 60 mg PO DAILY 01/10/23 04/15/23 History


 


Liothyronine Sodium [Cytomel] 25 mcg PO DAILY 01/10/23 04/15/23 History


 


Nitroglycerin Sl Tabs [Nitrostat] 0.4 mg SL Q5M PRN 01/10/23 04/15/23 History


 


Sertraline [Zoloft] 200 mg PO DAILY 01/10/23 04/15/23 History


 


carvediloL [Coreg] 3.125 mg PO BID 01/10/23 04/15/23 History


 


traZODone  mg PO HS 01/10/23 04/15/23 History


 


traZODone HCL [Desyrel] 100 mg PO DAILY@0400 01/10/23 04/15/23 History


 


lisinopriL [Zestril] 5 mg PO DAILY 04/15/23 04/15/23 History








                                    Allergies











Allergy/AdvReac Type Severity Reaction Status Date / Time


 


cephalexin [From Keflex] Allergy  Mouth Verified 04/15/23 15:18





   swelling  














Physical Exam


Vitals: 


                                   Vital Signs











  Temp Pulse Resp BP Pulse Ox FiO2


 


 04/15/23 13:40   88  16  141/72  100 


 


 04/15/23 13:29       100


 


 04/15/23 13:02   78  18  136/66  99 


 


 04/15/23 12:17   82  18  83/48  


 


 04/15/23 12:11   85  20  76/42  95 


 


 04/15/23 11:30   89  16  78/42  94 L 


 


 04/15/23 11:20   88  16  52/27  95 


 


 04/15/23 10:10   96  18  124/57  97 


 


 04/15/23 10:05   92  18  114/50  99 


 


 04/15/23 10:01   92  16  70/30  100 


 


 04/15/23 09:55   92  16  60/24  99 


 


 04/15/23 09:50   92  16  48/20  98  100


 


 04/15/23 09:45   96  16  60/42  99  100


 


 04/15/23 09:40  98.7 F  96  18  57/42  








                                Intake and Output











 04/14/23 04/15/23 04/15/23





 22:59 06:59 14:59


 


Intake Total   4.260


 


Balance   4.260


 


Intake:   


 


  Intake, IV Titration   4.260





  Amount   


 


    Norepinephrine 32 mg In   4.260





    Sodium Chloride 0.9% 218   





    ml @ 0.03 MCG/KG/MIN 1.   





    148 mls/hr IV .Q24H [facility]   





    Rx#:[id number]   


 


Other:   


 


  Weight   81.647 kg














PHYSICAL EXAMINATION: 


Patient is on mechanical ventilator.


HEENT: Normocephalic. Neck is supple. Pupils reactive. Nostrils clear. Oral 

cavity is moist. 


Neck reveals no JVD, carotid bruits, or thyromegaly. 


CHEST EXAMINATION: Trachea is central. Symmetrical expansion.  Bibasilar 

diminished sounds and coarse breath sounds.  No wheezing.


CARDIAC: Normal S1, S2 with no gallops. No murmurs 


ABDOMEN: Soft. Bowel sounds present.  Nontender.  No organomegaly. No abdominal 

bruits. 


Extremities: Trace lower extremity edema.  No clubbing or cyanosis


Neurologically patient is on mechanical ventilator.


Skin: No rash or skin lesions. 


Psychiatric: Could not be assessed at,


Musculoskeletal: No joint swelling or deformity.








Results


CBC & Chem 7: 


                                 04/16/23 05:29





                                 04/16/23 05:29


Labs: 


                  Abnormal Lab Results - Last 24 Hours (Table)











  04/15/23 04/15/23 04/15/23 Range/Units





  10:02 10:02 10:02 


 


RBC  3.53 L    (3.80-5.40)  m/uL


 


MCV  107.2 H    (80.0-100.0)  fL


 


MCHC  30.0 L    (31.0-37.0)  g/dL


 


D-Dimer   3.10 H   (<0.60)  mg/L FEU


 


ABG pH     (7.35-7.45)  


 


ABG pCO2     (35-45)  mmHg


 


ABG pO2     ()  mmHg


 


ABG HCO3     (21-25)  mmol/L


 


ABG O2 Saturation     (94-97)  %


 


Carbon Dioxide    17 L  (22-30)  mmol/L


 


Glucose    325 H  (74-99)  mg/dL


 


AST    57 H  (14-36)  U/L














  04/15/23 Range/Units





  12:10 


 


RBC   (3.80-5.40)  m/uL


 


MCV   (80.0-100.0)  fL


 


MCHC   (31.0-37.0)  g/dL


 


D-Dimer   (<0.60)  mg/L FEU


 


ABG pH  7.17 L*  (7.35-7.45)  


 


ABG pCO2  53 H  (35-45)  mmHg


 


ABG pO2  75 L  ()  mmHg


 


ABG HCO3  19 L  (21-25)  mmol/L


 


ABG O2 Saturation  91.4 L  (94-97)  %


 


Carbon Dioxide   (22-30)  mmol/L


 


Glucose   (74-99)  mg/dL


 


AST   (14-36)  U/L














Thrombosis Risk Factor Assmnt





- DVT/VTE Prophylaxis


DVT/VTE Prophylaxis: Pharmacologic Prophylaxis ordered





Assessment and Plan


Assessment: 








Acute cardiac arrest s/p CPR for about 15 minutes with return of spontaneous 

circulation.  Status post intubation by EMS.


Possible anoxic brain injury


Chronic atrial fibrillation on anticoagulant Eliquis


History of ICD placement


Hyperglycemia


COPD on home oxygen


Chronic hypoxic respiratory failure on 4 L oxygen via nasal cannula


Recent history of femoral neck fracture repair in January 2023


History of IVC filter placement


Anxiety/depression





Plan:


Patient is currently in the MICU.  Status post CPR and currently on mechanical 

ventilator.  Downtime was was about 15 minutes.  Possible anoxic brain injury 

also is being considered.


Continue with antibiotics for pneumonia with Levaquin.  Continue insulin sliding

scale.


Critical care team, pulmonary and neurology is on board.  Prognosis poor at this

time.


Discussed with her son at bedside in detail.  Continue to follow closely.





Time with Patient: Greater than 30

## 2023-04-15 NOTE — CT
EXAMINATION TYPE: CT brain wo con

CT DLP: 1131.4 mGycm, Automated exposure control for dose reduction was used.

 

DATE OF EXAM: 4/15/2023 11:19 AM

 

COMPARISON: None.

 

CLINICAL INDICATION:Female, 67 years old with history of ams, cardiac arrest.

 

TECHNIQUE: 

Brain: Multiple axial CT images of the brain were obtained without IV contrast. Coronal and sagittal 
reformats reviewed.

 

FINDINGS:

 

Brain:

Extra-axial spaces: No abnormal extra-axial fluid collections.

Ventricular system: Within normal limits

Cerebral parenchyma: Cerebral atrophy. No acute intraparenchymal hemorrhage or mass effect.  The gray
-white junction is well differentiated. Nonspecific bilateral basal ganglia calcifications.

Cerebellum: Unremarkable.

Mass effect: No evidence of midline shift.

Intracranial vasculature: unremarkable

Soft tissues: Normal.

Calvarium/osseous structures: No depressed skull fracture.

Paranasal sinuses and mastoid air cells: Mastoid air cells are clear. Air-fluid level within the left
 maxillary sinus. Scattered mild mucosal thickening of the left ethmoid sinus and bilateral sphenoid 
sinuses. Secretions demonstrated within the nasopharynx.

Visualized orbits: Bilateral aphakia

 

 

IMPRESSION:

1.  No acute intracranial process.

2.  Paranasal sinus disease with air-fluid level within the left maxilla sinus. Correlate for acute s
inusitis.

## 2023-04-15 NOTE — CT
EXAMINATION TYPE: CT angio chest

CT DLP: 808.5 mGycm, Automated exposure control for dose reduction was used.

 

DATE OF EXAM: 4/15/2023 11:20 AM

 

COMPARISON: Chest radiograph from same day. 

 

CLINICAL INDICATION:Female, 67 years old with history of arrest; cardiac arrest and elevated d-dimer

 

TECHNIQUE/CONTRAST: 

CTA scan of the thorax is performed with IV Contrast, patient injected with 65 mL of Isovue 370, pulm
onary embolism protocol.  MIP images are created and reviewed.  

 

FINDINGS: 

 

Pulmonary Artery: There is no evidence for a filling defect within the pulmonary vasculature to sugge
st acute pulmonary embolism.  The pulmonary artery is large measuring 3.5 cm in diameter which can be
 seen in the setting of pulmonary arterial hypertension.

Lungs/Pleura: No pneumothorax or pleural effusion. Patchy consolidation demonstrated within both lowe
r lobes and right upper lobe with scattered patchy groundglass opacities throughout the lungs..

Airway: Endotracheal tube terminates 1.2 cm above the guzman.

Heart: Cardiomegaly. No pericardial effusion.

Vasculature: No evidence of aortic aneurysm. Left chest wall 3-lead cardiac pacemaking device.

Mediastinum: No gross evidence of adenopathy.

Musculoskeletal: Acute minimally displaced bilateral anterior rib fractures. These include right firs
t, second, third, fourth, fifth, sixth, seventh ribs and the left second, third, and fourth ribs. Hea
ling left rib fractures with callus formation. Remote posterior bilateral rib fractures. Left shoulde
r prosthesis. Moderate right shoulder arthropathy. Multilevel degenerative disc disease.

Soft Tissues: Unremarkable.

Lower neck: No significant findings.

Upper Abdomen: NG tube terminates within the stomach body. Postsurgical changes of the stomach. Anter
ior abdominal wall surgical changes..

 

IMPRESSION:

1. No evidence of pulmonary embolism.

 

2. Bilateral lower lobe and right upper lobe consolidation with scattered multifocal groundglass opac
ities throughout the lungs. Findings are compatible with pneumonia possibly from aspiration.

 

3.  Multiple acute minimally displaced anterior rib fractures likely related to CPR in the setting of
 cardiac arrest. Remote to subacute bilateral rib fractures also demonstrated. No pneumothorax.

 

4. Cardiomegaly.

 

5. NG tube and endotracheal tube appear in proper position.

## 2023-04-16 LAB
ALBUMIN SERPL-MCNC: 3.4 G/DL (ref 3.5–5)
ALP SERPL-CCNC: 81 U/L (ref 38–126)
ALT SERPL-CCNC: 21 U/L (ref 4–34)
ANION GAP SERPL CALC-SCNC: 5 MMOL/L
ANION GAP SERPL CALC-SCNC: 9 MMOL/L
AST SERPL-CCNC: 45 U/L (ref 14–36)
BASOPHILS # BLD AUTO: 0 K/UL (ref 0–0.2)
BASOPHILS NFR BLD AUTO: 0 %
BUN SERPL-SCNC: 18 MG/DL (ref 7–17)
BUN SERPL-SCNC: 19 MG/DL (ref 7–17)
CALCIUM SPEC-MCNC: 7.1 MG/DL (ref 8.4–10.2)
CALCIUM SPEC-MCNC: 7.7 MG/DL (ref 8.4–10.2)
CHLORIDE SERPL-SCNC: 112 MMOL/L (ref 98–107)
CHLORIDE SERPL-SCNC: 113 MMOL/L (ref 98–107)
CO2 BLDA-SCNC: 20 MMOL/L (ref 19–24)
CO2 SERPL-SCNC: 18 MMOL/L (ref 22–30)
CO2 SERPL-SCNC: 20 MMOL/L (ref 22–30)
EOSINOPHIL # BLD AUTO: 0 K/UL (ref 0–0.7)
EOSINOPHIL NFR BLD AUTO: 0 %
ERYTHROCYTE [DISTWIDTH] IN BLOOD BY AUTOMATED COUNT: 3.44 M/UL (ref 3.8–5.4)
ERYTHROCYTE [DISTWIDTH] IN BLOOD: 14.2 % (ref 11.5–15.5)
GLUCOSE BLD-MCNC: 130 MG/DL (ref 70–110)
GLUCOSE BLD-MCNC: 132 MG/DL (ref 70–110)
GLUCOSE BLD-MCNC: 147 MG/DL (ref 70–110)
GLUCOSE BLD-MCNC: 159 MG/DL (ref 70–110)
GLUCOSE SERPL-MCNC: 146 MG/DL (ref 74–99)
GLUCOSE SERPL-MCNC: 149 MG/DL (ref 74–99)
HCO3 BLDA-SCNC: 20 MMOL/L (ref 21–25)
HCT VFR BLD AUTO: 34.1 % (ref 34–46)
HGB BLD-MCNC: 10.8 GM/DL (ref 11.4–16)
LYMPHOCYTES # SPEC AUTO: 0.8 K/UL (ref 1–4.8)
LYMPHOCYTES NFR SPEC AUTO: 9 %
MAGNESIUM SPEC-SCNC: 1.7 MG/DL (ref 1.6–2.3)
MAGNESIUM SPEC-SCNC: 1.9 MG/DL (ref 1.6–2.3)
MCH RBC QN AUTO: 31.3 PG (ref 25–35)
MCHC RBC AUTO-ENTMCNC: 31.6 G/DL (ref 31–37)
MCV RBC AUTO: 99.1 FL (ref 80–100)
MONOCYTES # BLD AUTO: 0.3 K/UL (ref 0–1)
MONOCYTES NFR BLD AUTO: 3 %
NEUTROPHILS # BLD AUTO: 7.8 K/UL (ref 1.3–7.7)
NEUTROPHILS NFR BLD AUTO: 87 %
PCO2 BLDA: 34 MMHG (ref 35–45)
PH BLDA: 7.35 [PH] (ref 7.35–7.45)
PH UR: 6 [PH] (ref 5–8)
PLATELET # BLD AUTO: 229 K/UL (ref 150–450)
PO2 BLDA: 90 MMHG (ref 83–108)
POTASSIUM SERPL-SCNC: 3.5 MMOL/L (ref 3.5–5.1)
POTASSIUM SERPL-SCNC: 3.9 MMOL/L (ref 3.5–5.1)
PROT SERPL-MCNC: 6 G/DL (ref 6.3–8.2)
PROT UR QL: (no result)
RBC UR QL: 2 /HPF (ref 0–5)
SODIUM SERPL-SCNC: 138 MMOL/L (ref 137–145)
SODIUM SERPL-SCNC: 139 MMOL/L (ref 137–145)
SP GR UR: 1.03 (ref 1–1.03)
SQUAMOUS UR QL AUTO: <1 /HPF (ref 0–4)
UROBILINOGEN UR QL STRIP: <2 MG/DL (ref ?–2)
WBC # BLD AUTO: 8.9 K/UL (ref 3.8–10.6)
WBC # UR AUTO: 2 /HPF (ref 0–5)

## 2023-04-16 PROCEDURE — 4A133B1 MONITORING OF ARTERIAL PRESSURE, PERIPHERAL, PERCUTANEOUS APPROACH: ICD-10-PCS

## 2023-04-16 PROCEDURE — 4A133J1 MONITORING OF ARTERIAL PULSE, PERIPHERAL, PERCUTANEOUS APPROACH: ICD-10-PCS

## 2023-04-16 PROCEDURE — 03HY32Z INSERTION OF MONITORING DEVICE INTO UPPER ARTERY, PERCUTANEOUS APPROACH: ICD-10-PCS

## 2023-04-16 RX ADMIN — INSULIN ASPART SCH: 100 INJECTION, SOLUTION INTRAVENOUS; SUBCUTANEOUS at 23:16

## 2023-04-16 RX ADMIN — IPRATROPIUM BROMIDE AND ALBUTEROL SULFATE SCH ML: .5; 3 SOLUTION RESPIRATORY (INHALATION) at 15:21

## 2023-04-16 RX ADMIN — PIPERACILLIN AND TAZOBACTAM SCH MLS/HR: 3; .375 INJECTION, POWDER, FOR SOLUTION INTRAVENOUS at 18:19

## 2023-04-16 RX ADMIN — LEVETIRACETAM SCH MLS/HR: 15 INJECTION INTRAVENOUS at 08:16

## 2023-04-16 RX ADMIN — MIDAZOLAM SCH: 5 INJECTION INTRAMUSCULAR; INTRAVENOUS at 01:00

## 2023-04-16 RX ADMIN — NOREPINEPHRINE BITARTRATE SCH: 1 INJECTION, SOLUTION, CONCENTRATE INTRAVENOUS at 13:46

## 2023-04-16 RX ADMIN — INSULIN ASPART SCH: 100 INJECTION, SOLUTION INTRAVENOUS; SUBCUTANEOUS at 12:55

## 2023-04-16 RX ADMIN — LEVETIRACETAM SCH MLS/HR: 15 INJECTION INTRAVENOUS at 20:46

## 2023-04-16 RX ADMIN — INSULIN ASPART SCH UNIT: 100 INJECTION, SOLUTION INTRAVENOUS; SUBCUTANEOUS at 07:05

## 2023-04-16 RX ADMIN — IPRATROPIUM BROMIDE AND ALBUTEROL SULFATE SCH ML: .5; 3 SOLUTION RESPIRATORY (INHALATION) at 07:52

## 2023-04-16 RX ADMIN — PIPERACILLIN AND TAZOBACTAM SCH MLS/HR: 3; .375 INJECTION, POWDER, FOR SOLUTION INTRAVENOUS at 10:42

## 2023-04-16 RX ADMIN — CHLORHEXIDINE GLUCONATE SCH ML: 1.2 RINSE ORAL at 20:46

## 2023-04-16 RX ADMIN — APIXABAN SCH MG: 5 TABLET, FILM COATED ORAL at 08:08

## 2023-04-16 RX ADMIN — PANTOPRAZOLE SODIUM SCH MG: 40 INJECTION, POWDER, FOR SOLUTION INTRAVENOUS at 09:33

## 2023-04-16 RX ADMIN — APIXABAN SCH MG: 5 TABLET, FILM COATED ORAL at 20:46

## 2023-04-16 RX ADMIN — CHLORHEXIDINE GLUCONATE SCH ML: 1.2 RINSE ORAL at 09:42

## 2023-04-16 RX ADMIN — INSULIN ASPART SCH: 100 INJECTION, SOLUTION INTRAVENOUS; SUBCUTANEOUS at 18:19

## 2023-04-16 RX ADMIN — IPRATROPIUM BROMIDE AND ALBUTEROL SULFATE SCH ML: .5; 3 SOLUTION RESPIRATORY (INHALATION) at 20:23

## 2023-04-16 RX ADMIN — MIDAZOLAM SCH: 5 INJECTION INTRAMUSCULAR; INTRAVENOUS at 23:14

## 2023-04-16 RX ADMIN — METRONIDAZOLE SCH: 500 INJECTION, SOLUTION INTRAVENOUS at 18:20

## 2023-04-16 RX ADMIN — INSULIN ASPART SCH: 100 INJECTION, SOLUTION INTRAVENOUS; SUBCUTANEOUS at 00:04

## 2023-04-16 RX ADMIN — IPRATROPIUM BROMIDE AND ALBUTEROL SULFATE SCH ML: .5; 3 SOLUTION RESPIRATORY (INHALATION) at 11:39

## 2023-04-16 NOTE — XR
EXAMINATION TYPE: XR chest 1V portable

 

DATE OF EXAM: 4/16/2023

 

CLINICAL HISTORY: Difficulty breathing progress study.  

 

TECHNIQUE: Single AP portable semiupright view of the chest is obtained.

 

COMPARISON: Chest x-ray and CTA chest from one day earlier and older studies

 

FINDINGS:  Stable endotracheal and orogastric tubes. Cardiac silhouette size stable and upper limits 
of normal with multi lead pacemaker/defibrillator redemonstrated.  Persistent bibasilar increased opa
cities and right central opacities. Surgical change left shoulder is partially imaged.

 

IMPRESSION: Persistent right midlung and bibasilar multifocal acute infiltrates and/or edema. No cassidy
ge from one day earlier.

## 2023-04-16 NOTE — PCN
PROCEDURE NOTE



PROCEDURE:

Right radial arterial line.



PREOPERATIVE DIAGNOSES:

Hypotension, administration of fluids and pressors.  Frequent blood draws.



POSTOPERATIVE DIAGNOSES:

Hypotension, administration of fluids and pressors.  Frequent blood draws.



:

Dr. Terrance Suggs.



SITE:

Right radial site.



ARTERIAL LINE PLACEMENT:

Indications:  Hemodynamic monitoring.



A time-out was completed verifying correct patient, procedure, site, positioning, and

implant(s) or special equipment if applicable.



Zachary's test was performed to ensure adequate perfusion.  The patient's  right/left

wrist or  right/left groin was prepped and draped in sterile fashion.  1% Lidocaine was

used to anesthetize the area.  An 18G Arrow arterial line was introduced into the

radial/femoral artery.  The catheter was threaded over the guide wire and the needle

was removed with appropriate pulsatile blood return.  Blood loss was minimal.  The

catheter was then sutured in place to the skin and a sterile dressing applied.

Perfusion to the extremity distal to the point of catheter insertion was checked and

found to be adequate.



There was no immediate complication.  There was good blood return and waveform.  The

patient tolerated the procedure well.  The catheter was sutured in place.  A sterile

dressing was applied by the nurse.  There was informed consent and universal time-out.



MMODL / IJN: [id number] / Job#: [id number]

## 2023-04-16 NOTE — P.PN
Subjective


Progress Note Date: 04/16/23


Principal diagnosis: 





Cardiac arrest.





Pulmonary/critical care consult dated 04/15/2023.





67-year-old female who apparently had a cardiopulmonary arrest, at a local 

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  The patient apparently had a downtime of about 15 

minutes, for there was cardiopulmonary resuscitation and return of spontaneous 

circulation.  The patient was seen in the ER, by the ER physician, Dr. Pennington.  

The patient was vented, and a right femoral vein triple lumen catheter was 

placed.  Family members are in the room, we selected see the patient.  The 

patient herself is unresponsive.  She has a orally placed endotracheal tube.  

She's currently on the ventilator, with vent settings of volume assist control, 

rate 20, tidal volume 375, FiO2 100%, 5.  Blood gases show pO2 75, pCO2 of 53, 

and a pH is 7.17.  The patient is on norepinephrine at 0.15 mcg/kg/m, and 

propofol at 15 mcg/kg/m.  The patient has a history of COPD from secondhand 

tobacco exposure, a previous history of the fibrillator placement for 

cardiomyopathy, and history of stroke, and [place] filter placement.  In 

addition, the patient is on home O2, that she is supposed to use all the time, 

but she only uses as needed.  She was a smoker in the distant past.  White count

9.7, hemoglobin 11.4, hematocrit 37.9, and platelet count was normal.  D-dimer 

was 3.10.  Sodium 137, potassium 4.3, chlorides 104, CO2 17, anion gap 16, BUN 

and creatinine 12 and 0.76.  Troponin was 0.022 and N-terminal proBNP was 2290. 

Lactate was not measured but is probably elevated.  Testing for influenza A, and

B, RSV, and coronavirus are all negative.  Chest x-ray shows evidence of 

cardiomegaly, fluid overload, and an appropriately placed endotracheal tube.  

Computed tomography scan of the brain showed nothing acute.  CT angiogram was 

negative for PE.  There also may be some right lower lobe consolidation 

consistent with prior aspiration.





Progress note dated 04/16/2023.





67-year-old female who had an out-of-hospital cardiopulmonary arrest.  The 

patient had about 15 minutes of resuscitation before there was return of s

pontaneous circulation.  Unfortunately, the patient may have sustained anoxic 

brain injury.  Today, we place an art line.  She remains on the ventilator.  I 

did have neurology see her.  She is on volume assist control, rate 20, tidal 

volume 375, FiO2 60%, PEEP of 5.  Arterial blood gases show pO2 of 90, pCO2 34, 

and pH is 7.35.  The patient is on norepinephrine at 0.05 mcg/kg/m, propofol at 

40 mcg/kg/m, and saline at 75 mL an hour.  We will discontinue the Levaquin and 

Flagyl and start Zosyn.  In addition we'll start some tube feeds, at 10 mL an 

hour.  A right radial art line will be placed today.  White count 8.9, 

hemoglobin 10.8, hematocrit 34.1, and platelet count is normal.  Sodium 139, 

potassium 3.9, chlorides 112, CO2 18, BUN 19, creatinine 0.71.  Troponins were 

0.086 and 0.091.  Chest x-ray shows persistent bilateral infiltrates, more so in

the right lung than on the left.  This may relate to aspiration.





Objective





- Vital Signs


Vital signs: 


                                   Vital Signs











Temp  100.4 F H  04/16/23 08:30


 


Pulse  92   04/16/23 11:00


 


Resp  20   04/16/23 11:00


 


BP  122/58   04/16/23 10:00


 


Pulse Ox  95   04/16/23 11:00


 


FiO2  60   04/16/23 10:00








                                 Intake & Output











 04/15/23 04/16/23 04/16/23





 18:59 06:59 18:59


 


Intake Total 554.265 8255.334 544.505


 


Output Total 232 505 215


 


Balance 555.851 638.334 329.505


 


Weight 81.647 kg  


 


Intake:   


 


  IV  925 325


 


    Sodium Chloride 0.9% 1,  825 225





    000 ml @ 75 mls/hr IV .   





    [id number] STA Rx#:[id number]   


 


    levETIRAcetam IV 1,500 mg  100 100





    In Saline 1 100ml.bag @   





    400 mls/hr IVPB ONCE STA   





    Rx#:[id number]   


 


  Intake, IV Titration 787.851 218.334 219.505





  Amount   


 


    Levofloxacin 750Mg-D5w 150  





    Pmx 750 mg In Dextrose/   





    Water 1 150ml.bag @ 100   





    mls/hr IVPB ONCE STA Rx#:   





    [id number]   


 


    Magnesium Sulfate-D5w Pmx   100





    1 gm In Dextrose/Water 1   





    100ml.bag @ 100 mls/hr   





    IVPB ONCE ONE Rx#:   





    [id number]   


 


    Norepinephrine 32 mg In 20.410 18.334 19.505





    Sodium Chloride 0.9% 218   





    ml @ 0.03 MCG/KG/MIN 1.   





    148 mls/hr IV .Q24H [facility]   





    Rx#:[id number]   


 


    Piperacillin-Tazobactam 3   100





    .375 gm In Sodium   





    Chloride 0.9% 100 ml @ 25   





    mls/hr IVPB Q8H [facility] Rx#:   





    [id number]   


 


    Sodium Chloride 0.9% 1, 450  





    000 ml @ 75 mls/hr IV .   





    [id number] STA Rx#:[id number]   


 


    levETIRAcetam IV 1,500 mg 100  





    In Saline 1 100ml.bag @   





    400 mls/hr IVPB Q12HR [facility]   





    Rx#:[id number]   


 


    propofoL 1,000 mg In 67.441 200.000 





    Empty Bag 1 bag @ 15 MCG/   





    KG/MIN 7.348 mls/hr IV .   





    [id number] [facility] Rx#:[id number]   


 


Output:   


 


  Urine 232 505 215


 


Other:   


 


  Voiding Method Indwelling Catheter Indwelling Catheter 








                       ABP, PAP, CO, CI - Last Documented











Arterial Blood Pressure        123/50

















- Exam





No acute distress, sedated on propofol, with an orally placed endotracheal tube.





HEENT examination is grossly unremarkable.  





Neck supple.  Full range of motion.  No adenopathy thyromegaly or neck vein 

distention.





Cardiovascular examination reveals regular rhythm rate.  S1-S2 normal.  No S3 or

S4.  No discernible murmur noted.  Heart rate 92 bpm.





Lungs reveal scattered rhonchi.  No wheezes or crackles.  Breath sounds equal.  

Saturations are 95-97 %.





Abdomen obese, without bowel sounds.





Extremities are intact.  No cyanosis clubbing or edema.





Skin is without rash or lesion.





Neurologic examination cannot be adequately assessed at this time.





- Labs


CBC & Chem 7: 


                                 04/16/23 05:29





                                 04/16/23 05:29


Labs: 


                  Abnormal Lab Results - Last 24 Hours (Table)











  04/15/23 04/15/23 04/15/23 Range/Units





  12:10 13:34 14:08 


 


RBC     (3.80-5.40)  m/uL


 


Hgb     (11.4-16.0)  gm/dL


 


Neutrophils #     (1.3-7.7)  k/uL


 


Lymphocytes #     (1.0-4.8)  k/uL


 


ABG pH  7.17 L*    (7.35-7.45)  


 


ABG pCO2  53 H    (35-45)  mmHg


 


ABG pO2  75 L    ()  mmHg


 


ABG HCO3  19 L    (21-25)  mmol/L


 


ABG O2 Saturation  91.4 L    (94-97)  %


 


Chloride     ()  mmol/L


 


Carbon Dioxide     (22-30)  mmol/L


 


BUN     (7-17)  mg/dL


 


Glucose     (74-99)  mg/dL


 


POC Glucose (mg/dL)    199 H  ()  mg/dL


 


Plasma Lactic Acid [place]   2.9 H*   (0.7-2.0)  mmol/L


 


Calcium     (8.4-10.2)  mg/dL


 


AST     (14-36)  U/L


 


Troponin I     (0.000-0.034)  ng/mL


 


Total Protein     (6.3-8.2)  g/dL


 


Albumin     (3.5-5.0)  g/dL














  04/15/23 04/15/23 04/15/23 Range/Units





  15:16 15:24 18:09 


 


RBC     (3.80-5.40)  m/uL


 


Hgb     (11.4-16.0)  gm/dL


 


Neutrophils #     (1.3-7.7)  k/uL


 


Lymphocytes #     (1.0-4.8)  k/uL


 


ABG pH  7.25 L    (7.35-7.45)  


 


ABG pCO2     (35-45)  mmHg


 


ABG pO2  158 H    ()  mmHg


 


ABG HCO3  19 L    (21-25)  mmol/L


 


ABG O2 Saturation  97.5 H    (94-97)  %


 


Chloride     ()  mmol/L


 


Carbon Dioxide     (22-30)  mmol/L


 


BUN     (7-17)  mg/dL


 


Glucose     (74-99)  mg/dL


 


POC Glucose (mg/dL)    136 H  ()  mg/dL


 


Plasma Lactic Acid Kyaw     (0.7-2.0)  mmol/L


 


Calcium     (8.4-10.2)  mg/dL


 


AST     (14-36)  U/L


 


Troponin I   0.069 H*   (0.000-0.034)  ng/mL


 


Total Protein     (6.3-8.2)  g/dL


 


Albumin     (3.5-5.0)  g/dL














  04/15/23 04/15/23 04/16/23 Range/Units





  22:36 23:54 05:29 


 


RBC    3.44 L  (3.80-5.40)  m/uL


 


Hgb    10.8 L  (11.4-16.0)  gm/dL


 


Neutrophils #    7.8 H  (1.3-7.7)  k/uL


 


Lymphocytes #    0.8 L  (1.0-4.8)  k/uL


 


ABG pH     (7.35-7.45)  


 


ABG pCO2     (35-45)  mmHg


 


ABG pO2     ()  mmHg


 


ABG HCO3     (21-25)  mmol/L


 


ABG O2 Saturation     (94-97)  %


 


Chloride     ()  mmol/L


 


Carbon Dioxide     (22-30)  mmol/L


 


BUN     (7-17)  mg/dL


 


Glucose     (74-99)  mg/dL


 


POC Glucose (mg/dL)   150 H   ()  mg/dL


 


Plasma Lactic Acid Kyaw     (0.7-2.0)  mmol/L


 


Calcium     (8.4-10.2)  mg/dL


 


AST     (14-36)  U/L


 


Troponin I  0.086 H*    (0.000-0.034)  ng/mL


 


Total Protein     (6.3-8.2)  g/dL


 


Albumin     (3.5-5.0)  g/dL














  04/16/23 04/16/23 04/16/23 Range/Units





  05:29 05:29 06:25 


 


RBC     (3.80-5.40)  m/uL


 


Hgb     (11.4-16.0)  gm/dL


 


Neutrophils #     (1.3-7.7)  k/uL


 


Lymphocytes #     (1.0-4.8)  k/uL


 


ABG pH     (7.35-7.45)  


 


ABG pCO2    34 L  (35-45)  mmHg


 


ABG pO2     ()  mmHg


 


ABG HCO3    20 L  (21-25)  mmol/L


 


ABG O2 Saturation     (94-97)  %


 


Chloride  112 H    ()  mmol/L


 


Carbon Dioxide  18 L    (22-30)  mmol/L


 


BUN  19 H    (7-17)  mg/dL


 


Glucose  146 H    (74-99)  mg/dL


 


POC Glucose (mg/dL)     ()  mg/dL


 


Plasma Lactic Acid Kyaw     (0.7-2.0)  mmol/L


 


Calcium  7.7 L    (8.4-10.2)  mg/dL


 


AST  45 H    (14-36)  U/L


 


Troponin I   0.091 H*   (0.000-0.034)  ng/mL


 


Total Protein  6.0 L    (6.3-8.2)  g/dL


 


Albumin  3.4 L    (3.5-5.0)  g/dL














  04/16/23 Range/Units





  07:00 


 


RBC   (3.80-5.40)  m/uL


 


Hgb   (11.4-16.0)  gm/dL


 


Neutrophils #   (1.3-7.7)  k/uL


 


Lymphocytes #   (1.0-4.8)  k/uL


 


ABG pH   (7.35-7.45)  


 


ABG pCO2   (35-45)  mmHg


 


ABG pO2   ()  mmHg


 


ABG HCO3   (21-25)  mmol/L


 


ABG O2 Saturation   (94-97)  %


 


Chloride   ()  mmol/L


 


Carbon Dioxide   (22-30)  mmol/L


 


BUN   (7-17)  mg/dL


 


Glucose   (74-99)  mg/dL


 


POC Glucose (mg/dL)  159 H  ()  mg/dL


 


Plasma Lactic Acid Kyaw   (0.7-2.0)  mmol/L


 


Calcium   (8.4-10.2)  mg/dL


 


AST   (14-36)  U/L


 


Troponin I   (0.000-0.034)  ng/mL


 


Total Protein   (6.3-8.2)  g/dL


 


Albumin   (3.5-5.0)  g/dL








                      Microbiology - Last 24 Hours (Table)











 04/15/23 11:19 Legionella Culture - Preliminary





 Sputum 


 


 04/15/23 11:19 Sputum Culture - Preliminary





 Sputum 














Assessment and Plan


Assessment: 





Status post out-of-hospital cardiopulmonary arrest, with at least 15 minutes of 

downtime, before cardiopulmonary resuscitation and return of spontaneous 

circulation, were accomplished.





Status post intubation and mechanical ventilation for cardiopulmonary arrest, 

04/15/2023.





Rule out anoxic brain injury.





History of atrial fibrillation.





Status post AICD placement.





History of COPD.





History of hypertension.





History of CVA.





Prior history of [place] filter placement.





History of anxiety/depression.








Plan: 





Plan dated 04/15/2023.





Labs, x-rays, medications are reviewed.  The patient is seen in the emergency 

room, trauma 2.  We will await the patient to arrive in the intensive care unit.

 I did speak to the family about the fact that there may be some concerns of 

anoxic brain injury, given the 15 minute downtime at [facility].  Anyway, the 

[place] the next 24 hours will be critical.  Labs, x-rays, and medications are 

reviewed.  We will adjust the ventilator, and make additional recommendations 

along the way.  Prognosis is very guarded at this point.





Plan dated 04/16/2023.





A right radial arterial line was placed today.  The patient's labs, x-rays, and 

medications are reviewed.  We'll start the patient on tube feedings.  Levaquin 

and Flagyl be discontinued in favor of Zosyn for possible aspiration pneumonia. 

The patient remains on propofol for sedation.  She is also on norepinephrine at 

0.05 mcg/kg/m for blood pressure support.  Additional recommendations and 

suggestions are forthcoming.  Neurology is to see the patient.  An EEG was 

ordered.  Prognosis is certainly guarded.


Time with Patient: Greater than 30

## 2023-04-16 NOTE — P.CNNES
History of Present Illness


Consult date: 04/16/23


Requesting physician: Juanpablo Brannon


Reason for Consult: cardiac arrest with 15 minute down time


History of Present Illness: 


This is a 67-year-old woman with medical history of stroke, atrial fibrillation 

post Hang filter placement, CAD s/p PCI, AICD, COPD who presented 

emergency department because outside cardiac arrest.  History is obtained from 

medical record as well as the patient nurse.  Apparently patient was in normal 

state of health and was shopping at [email or web address] using her automatic cards and then 

she became short of breath struggled for air and she became unresponsive.  CPR 

was initiated.  Seems that CPR was initiated around her 9:10 AM on 11/05/2023 

and the patient was down for 15 minutes.  Per cardiology AICD was antegrade and 

the episode was nonsustained V. tach lasting for about a minute to a minute and 

a half and no shocks delivered.  Patient was intubated on a ventilator.  While 

in the ICU floor patient's had twitching of extremities per nursing staff.  As a

result the patient required Ativan as well as IV Keppra.  She is on IV propofol.

 Today according the nursing staff no further twitching or seizure-like activity

per the a.m. nurse today.





Of note seems the patient received 12 mg of Ativan total.  I have requested a 

total of 6 mg of Ativan for seizure/twiching but prior per nurse unsure reason. 




She received Keppra 1500 mg bolus once.





Some other workup during his hospital visit consisted of:


Blood pressure on presentation is 57/42.  Patient blood pressure improved and 

patient is on norepinephrine currently.


T-max of 102.9.  On initial presentation was 98.7.


white blood cell is within normal limits on 2 consecutive days.


Most recent troponins 0.091


Plasma lactic acid is 2.9


CT of the head is reported as no acute process.  Paranasal sinus disease with 

air fluid level within the left maxillary sinus.  Correlate for acute sinusitis.

 I personally reviewed the CT and there is no acute or subacute ischemia, no 

intracranial hemorrhage.  There is no mass effect.  There is no and edema is 

appreciable.


2-D echo was reported as left ventricular ejection fraction of 30-35% with 

mainly anterior septal hypokinesis.  No pericardial effusion.


EKG is reported as age are fibrillation with rapid ventricular response with 

aberrant conduction or ventricular premature complexes.  Left axis deviation.  

Moderate intraventricular conduction delay.  ST deviation to moderate T-wave 

abnormality.








Review of Systems


Review of systems Limited but the prone positive and negative as per HPI.








Past Medical History


Past Medical History: Atrial Fibrillation, COPD, Hypertension


Additional Past Medical History / Comment(s): at home O2


History of Any Multi-Drug Resistant Organisms: None Reported


Past Surgical History: No Surgical Hx Reported


Additional Past Surgical History / Comment(s): hang filter, left rotator 

cuff, RTKR.


Past Anesthesia/Blood Transfusion Reactions: No Reported Reaction


Type of Cardiac Device: Permanent Pacemaker, AICD


Device Placement Date:: 2002


Past Psychological History: Anxiety, Depression


Smoking Status: Never smoker


Past Alcohol Use History: None Reported, Occasional


Past Drug Use History: None Reported





- Past Family History


  ** Mother


Family Medical History: Cancer, Coronary Artery Disease (CAD)


Additional Family Medical History / Comment(s): urterine CA





Medications and Allergies


                                Home Medications











 Medication  Instructions  Recorded  Confirmed  Type


 


ARIPiprazole [Abilify] 2 mg PO DAILY 01/10/23 04/15/23 History


 


Alendronate Sodium [Fosamax] 70 mg PO Q7D 01/10/23 04/15/23 History


 


Apixaban [Eliquis] 5 mg PO BID 01/10/23 04/15/23 History


 


Atorvastatin [Lipitor] 40 mg PO HS 01/10/23 04/15/23 History


 


Carbidopa/Levodopa [Sinemet  1 tab PO BID 01/10/23 04/15/23 History





mg Tablet]    


 


Dicyclomine [Bentyl] 10 mg PO QID PRN 01/10/23 04/15/23 History


 


Furosemide [Lasix] 40 mg PO DAILY 01/10/23 04/15/23 History


 


Isosorbide Mononitrate ER [Imdur] 60 mg PO DAILY 01/10/23 04/15/23 History


 


Liothyronine Sodium [Cytomel] 25 mcg PO DAILY 01/10/23 04/15/23 History


 


Nitroglycerin Sl Tabs [Nitrostat] 0.4 mg SL Q5M PRN 01/10/23 04/15/23 History


 


Sertraline [Zoloft] 200 mg PO DAILY 01/10/23 04/15/23 History


 


carvediloL [Coreg] 3.125 mg PO BID 01/10/23 04/15/23 History


 


traZODone  mg PO HS 01/10/23 04/15/23 History


 


traZODone HCL [Desyrel] 100 mg PO DAILY@0400 01/10/23 04/15/23 History


 


lisinopriL [Zestril] 5 mg PO DAILY 04/15/23 04/15/23 History








                                    Allergies











Allergy/AdvReac Type Severity Reaction Status Date / Time


 


cephalexin [From Keflex] Allergy  Mouth Verified 04/15/23 15:18





   swelling  














Physical Examination





- Vital Signs


Vital Signs: 


                                   Vital Signs











  Temp Pulse Resp BP Pulse Ox FiO2


 


 04/16/23 11:44   84    


 


 04/16/23 11:39       60


 


 04/16/23 11:00   92  20   95 


 


 04/16/23 10:30   91  20   


 


 04/16/23 10:00   84  20  122/58  97  60


 


 04/16/23 09:30   90  22  141/55  89 L 


 


 04/16/23 09:00   80  20  121/48  


 


 04/16/23 08:30  100.4 F H  85  20  118/56  


 


 04/16/23 08:09   92    


 


 04/16/23 08:00   86  20  122/51   60


 


 04/16/23 07:56   86    


 


 04/16/23 07:51       60


 


 04/16/23 07:30   82  20  136/61  


 


 04/16/23 07:00   88  21  129/67  


 


 04/16/23 06:45   89  21   96 


 


 04/16/23 06:30   100  26 H  114/49  97 


 


 04/16/23 06:15   89  26 H  118/48  96 


 


 04/16/23 06:00  99.7 F H  86  20  117/55  96 


 


 04/16/23 05:45   86  24  123/48  98 


 


 04/16/23 05:30   86  22  122/53  96 


 


 04/16/23 05:15   79  21  119/55  97 


 


 04/16/23 05:00   86  23  104/59  97 


 


 04/16/23 04:45   87  23  120/53  97 


 


 04/16/23 04:30   86  22  119/53  97 


 


 04/16/23 04:27       60


 


 04/16/23 04:15   76  23  105/56  97 


 


 04/16/23 04:00  101.2 F H  89  22  115/54  97  60


 


 04/16/23 03:45   89  21   97 


 


 04/16/23 03:30   92  20  109/50  97 


 


 04/16/23 03:15   87  20  118/54  97 


 


 04/16/23 03:00   91  20  111/49  97 


 


 04/16/23 02:45   92  20  114/52  97 


 


 04/16/23 02:30   93  23  110/58  97 


 


 04/16/23 02:15   92  22  116/51  97 


 


 04/16/23 02:00   92  25 H  115/48  99 


 


 04/16/23 01:45   92  23  114/51  97 


 


 04/16/23 01:30   90  24  96/56  97 


 


 04/16/23 01:15   96  24  100/46  96 


 


 04/16/23 01:00  102.9 F H  90  24  99/50  96 


 


 04/16/23 00:45   89  23  98/46  96 


 


 04/16/23 00:30   89  29 H  115/49  96 


 


 04/16/23 00:15   87  22  104/50  95 


 


 04/16/23 00:13       60


 


 04/16/23 00:00  102.7 F H  86  22  109/57  96  60


 


 04/15/23 23:45   92  27 H  110/57  96 


 


 04/15/23 23:30   93  22  122/71  96 


 


 04/15/23 23:15   98  26 H  140/65  96 


 


 04/15/23 23:00   98  28 H  117/66  96 


 


 04/15/23 22:45   98  35 H  94/66  95 


 


 04/15/23 22:30   100  27 H  120/55  96 


 


 04/15/23 22:15   98  23  119/56  95 


 


 04/15/23 22:00   97  20  123/60  95 


 


 04/15/23 21:45   93  20  129/58  96 


 


 04/15/23 21:30   93  20  121/61  94 L 


 


 04/15/23 21:15   92  20  126/56  98 


 


 04/15/23 21:00   90  20  117/55  100 


 


 04/15/23 20:45   85  20  120/53  98 


 


 04/15/23 20:30   84  20  116/61  98 


 


 04/15/23 20:23   85    


 


 04/15/23 20:15   85  20  115/54  100 


 


 04/15/23 20:14       60


 


 04/15/23 20:00  102.3 F H  85  21  112/61  99  60


 


 04/15/23 19:00   79  20  119/56  


 


 04/15/23 18:50   70  20  115/56  


 


 04/15/23 18:40   72  20  120/55  99 


 


 04/15/23 18:30   80  20  117/63  


 


 04/15/23 18:20   76  22  120/49  


 


 04/15/23 18:10   76  20  118/49  


 


 04/15/23 18:00   70  20  122/63  


 


 04/15/23 17:50   79  20  82/59  


 


 04/15/23 17:40   84  20  117/61  98 


 


 04/15/23 17:30   80  20  118/43  98 


 


 04/15/23 17:20   81  20  110/67  98 


 


 04/15/23 17:10   82  20  108/62  98 


 


 04/15/23 17:00   82  20  107/63  98 


 


 04/15/23 16:50   84  20  109/58  97 


 


 04/15/23 16:40   84  20  111/59  97 


 


 04/15/23 16:30   81  20  110/49  99 


 


 04/15/23 16:20   81  20  109/51  99 


 


 04/15/23 16:10   73  20  114/49  


 


 04/15/23 16:00   82  20  111/51  98  80


 


 04/15/23 15:55  98.4 F  82  20  112/50  98  80


 


 04/15/23 15:50   82  20  116/55  98 


 


 04/15/23 15:45   83  20  110/49  98 


 


 04/15/23 15:40   80  20  105/51  97 


 


 04/15/23 15:35   84  20  109/51  


 


 04/15/23 15:30   84  20  112/55  


 


 04/15/23 15:25   73  20  93/59  98 


 


 04/15/23 15:20   80  20  110/48  99 


 


 04/15/23 15:19       80


 


 04/15/23 15:15   75  20  102/79  99 


 


 04/15/23 15:10   76  20  124/47  99 


 


 04/15/23 15:05   78  20  118/57  99 


 


 04/15/23 15:00   78  20  101/62  99 


 


 04/15/23 14:55   78  20  112/54  99 


 


 04/15/23 14:50   81  20  117/50  99 


 


 04/15/23 14:45   84  20  129/50  99 


 


 04/15/23 14:40   75  20  124/72  98 


 


 04/15/23 14:35   72  20  146/73  98 


 


 04/15/23 14:00  94.4 F L  70  20  151/74  100  100


 


 04/15/23 13:40   88  16  141/72  100 


 


 04/15/23 13:29       100


 


 04/15/23 13:02   78  18  136/66  99 


 


 04/15/23 12:17   82  18  83/48  


 


 04/15/23 12:11   85  20  76/42  95 








                                Intake and Output











 04/15/23 04/16/23 04/16/23





 22:59 06:59 14:59


 


Intake Total 1041.852 768.945 544.505


 


Output Total 392 305 215


 


Balance 649.852 463.945 329.505


 


Intake:   


 


   600 325


 


    Sodium Chloride 0.9% 1, 225 600 225





    000 ml @ 75 mls/hr IV .   





    [id number] STA Rx#:[id number]   


 


    levETIRAcetam IV 1,500 mg 100  100





    In Saline 1 100ml.bag @   





    400 mls/hr IVPB ONCE [facility]   





    Rx#:[id number]   


 


  Intake, IV Titration 716.852 168.945 219.505





  Amount   


 


    Levofloxacin 750Mg-D5w 150  





    Pmx 750 mg In Dextrose/   





    Water 1 150ml.bag @ 100   





    mls/hr IVPB ONCE [facility] Rx#:   





    [id number]   


 


    Magnesium Sulfate-D5w Pmx   100





    1 gm In Dextrose/Water 1   





    100ml.bag @ 100 mls/hr   





    IVPB ONCE ONE Rx#:   





    [id number]   


 


    Norepinephrine 32 mg In 11.264 11.484 19.505





    Sodium Chloride 0.9% 218   





    ml @ 0.03 MCG/KG/MIN 1.   





    148 mls/hr IV .Q24H [facility]   





    Rx#:[id number]   


 


    Piperacillin-Tazobactam 3   100





    .375 gm In Sodium   





    Chloride 0.9% 100 ml @ 25   





    mls/hr IVPB Q8H [facility] Rx#:   





    [id number]   


 


    Sodium Chloride 0.9% 1, 375  





    000 ml @ 75 mls/hr IV .   





    [id number] STA Rx#:[id number]   


 


    levETIRAcetam IV 1,500 mg 100  





    In Saline 1 100ml.bag @   





    400 mls/hr IVPB Q12HR [facility]   





    Rx#:[id number]   


 


    propofoL 1,000 mg In 80.588 157.461 





    Empty Bag 1 bag @ 15 MCG/   





    KG/MIN 7.348 mls/hr IV .   





    [id number] [facility] Rx#:[id number]   


 


Output:   


 


  Urine 392 305 215


 


Other:   


 


  Voiding Method Indwelling Catheter Indwelling Catheter 








                         ABP, PAP, CO, CI - Last 8 Hours











Arterial Blood Pressure        123/50


 


Arterial Blood Pressure        171/67


 


Arterial Blood Pressure        128/51


 


Arterial Blood Pressure        157/56














GENERAL: The patient is lying in bed and is not in acute distress.


CHEST: The heart rate is regular rate rhythm.   No murmurs to auscultation.  


LUNG: Clear to auscultation bilaterally no wheezing noted throughout.  Not 

labored breathing.


ABDOMEN/GI: Bowel sounds present in all 4 quadrants. No tenderness to palpation 

throughout.





NEUROLOGICAL:


Limited because of condition.  Is on IV Propofol 40mcg/kg/min.


Higher mental function: The patient is comatose GCS 3 (E1, VT1, M1).   


Cranial nerves: I had manually open her eyes.  Primary gaze is fixed upward 

gaze.  The pupils are round, equal and reactive to light .  No facial weakness. 

Is breathing over the vent.  Has postivie gag reflex.  


Motor: The strength is no movement event with painful stimuli not withdrawling. 

Decrease tone throughout.  Normal bulk.  


Cerebellum: Unable to assess.


Sensation: Unable to assess light touch.


Reflexes (right/left): 1+ throughout.


Plantars are mute bilaterally. 








Results





- Laboratory Findings


CBC and BMP: 


                                 04/16/23 05:29





                                 04/16/23 05:29


Abnormal Lab Findings: 


                                  Abnormal Labs











  04/15/23 04/15/23 04/15/23





  10:02 10:02 10:02


 


RBC  3.53 L  


 


Hgb   


 


MCV  107.2 H  


 


MCHC  30.0 L  


 


Neutrophils #   


 


Lymphocytes #   


 


D-Dimer   3.10 H 


 


ABG pH   


 


ABG pCO2   


 


ABG pO2   


 


ABG HCO3   


 


ABG O2 Saturation   


 


Chloride   


 


Carbon Dioxide    17 L


 


BUN   


 


Glucose    325 H


 


POC Glucose (mg/dL)   


 


Plasma Lactic Acid Kyaw   


 


Calcium   


 


AST    57 H


 


Troponin I   


 


Total Protein   


 


Albumin   


 


Urine Protein   


 


Urine Mucus   














  04/15/23 04/15/23 04/15/23





  12:10 13:34 14:08


 


RBC   


 


Hgb   


 


MCV   


 


MCHC   


 


Neutrophils #   


 


Lymphocytes #   


 


D-Dimer   


 


ABG pH  7.17 L*  


 


ABG pCO2  53 H  


 


ABG pO2  75 L  


 


ABG HCO3  19 L  


 


ABG O2 Saturation  91.4 L  


 


Chloride   


 


Carbon Dioxide   


 


BUN   


 


Glucose   


 


POC Glucose (mg/dL)    199 H


 


Plasma Lactic Acid Kyaw   2.9 H* 


 


Calcium   


 


AST   


 


Troponin I   


 


Total Protein   


 


Albumin   


 


Urine Protein   


 


Urine Mucus   














  04/15/23 04/15/23 04/15/23





  15:16 15:24 18:09


 


RBC   


 


Hgb   


 


MCV   


 


MCHC   


 


Neutrophils #   


 


Lymphocytes #   


 


D-Dimer   


 


ABG pH  7.25 L  


 


ABG pCO2   


 


ABG pO2  158 H  


 


ABG HCO3  19 L  


 


ABG O2 Saturation  97.5 H  


 


Chloride   


 


Carbon Dioxide   


 


BUN   


 


Glucose   


 


POC Glucose (mg/dL)    136 H


 


Plasma Lactic Acid Kyaw   


 


Calcium   


 


AST   


 


Troponin I   0.069 H* 


 


Total Protein   


 


Albumin   


 


Urine Protein   


 


Urine Mucus   














  04/15/23 04/15/23 04/16/23





  22:36 23:54 05:29


 


RBC    3.44 L


 


Hgb    10.8 L


 


MCV   


 


MCHC   


 


Neutrophils #    7.8 H


 


Lymphocytes #    0.8 L


 


D-Dimer   


 


ABG pH   


 


ABG pCO2   


 


ABG pO2   


 


ABG HCO3   


 


ABG O2 Saturation   


 


Chloride   


 


Carbon Dioxide   


 


BUN   


 


Glucose   


 


POC Glucose (mg/dL)   150 H 


 


Plasma Lactic Acid Kyaw   


 


Calcium   


 


AST   


 


Troponin I  0.086 H*  


 


Total Protein   


 


Albumin   


 


Urine Protein   


 


Urine Mucus   














  04/16/23 04/16/23 04/16/23





  05:29 05:29 06:25


 


RBC   


 


Hgb   


 


MCV   


 


MCHC   


 


Neutrophils #   


 


Lymphocytes #   


 


D-Dimer   


 


ABG pH   


 


ABG pCO2    34 L


 


ABG pO2   


 


ABG HCO3    20 L


 


ABG O2 Saturation   


 


Chloride  112 H  


 


Carbon Dioxide  18 L  


 


BUN  19 H  


 


Glucose  146 H  


 


POC Glucose (mg/dL)   


 


Plasma Lactic Acid Kyaw   


 


Calcium  7.7 L  


 


AST  45 H  


 


Troponin I   0.091 H* 


 


Total Protein  6.0 L  


 


Albumin  3.4 L  


 


Urine Protein   


 


Urine Mucus   














  04/16/23 04/16/23 04/16/23





  07:00 11:09 11:27


 


RBC   


 


Hgb   


 


MCV   


 


MCHC   


 


Neutrophils #   


 


Lymphocytes #   


 


D-Dimer   


 


ABG pH   


 


ABG pCO2   


 


ABG pO2   


 


ABG HCO3   


 


ABG O2 Saturation   


 


Chloride   


 


Carbon Dioxide   


 


BUN   


 


Glucose   


 


POC Glucose (mg/dL)  159 H   132 H


 


Plasma Lactic Acid Kyaw   


 


Calcium   


 


AST   


 


Troponin I   


 


Total Protein   


 


Albumin   


 


Urine Protein   1+ H 


 


Urine Mucus   Rare H 














Assessment and Plan


Assessment: 





Cardiopulmonary arrest lasting for 15 minutes.  It appears the patient had 

episode of nonsustained V. tach is seems to happen 1 hour prior to the cardiac 

arrest but per cardiology unsure if true VT or A.fib


Encephalopathy due to Anoxic due to above.  Rule out anoxic brain injury. 

Patient has some brainstem reflex.  Exam is somewhat limited because of 

sedation.


Reported twitching of body that is intermittent on 04/15/23:  Probable seizures 

due to cardiopulmonary arrest--today resolved


Cardiomyopathy with ejection fraction of 30-35%


Status post intubation and mechanical ventilation due to cardiopulmonary last


Non-STEMI


Paroxysmal atrial fibrillation


History of stroke


AICD


Hypertension


CAD with prior PCI


History of green filter placement





Plan: 


I ordered a stat EEG


Continue Keppra 1500 mg IV every 12 hours (new during this admission) for her 

twitching of body for concern of seizure.


Recommend sedation holiday.


I will get repeat CT head for tomorrow to assess any changes compared to day 

prior.


It appears patient has fever and suspected aspiration pneumonia.  


Will defer management to primary and ICU team.


The condition is very guarded.





Plan is discussed with patient's nurse.


Thank you for the consultation.





Dr. Guerrero will start neurology service tomorrow A.M.





Time with Patient: Greater than 30

## 2023-04-16 NOTE — CA
Transthoracic Echo Report 

 Name: Delphine Delgadillo 

 MRN:    [id number] 

 Age:    67     Gender:     F 

 

 :    1955 

 Exam Date:     04/15/2023 15:09 

 Exam Location: [facility] 

 Ht (in):     64     Wt (lb):     180 

 Ordering Physician:        Jesus Blanc DO  

                            ([email or web address]) 

 Attending/Referring Phys: 

 Technician         Dixie Latif RDCS 

 Procedure CPT: 

 Indications:       Cardiac Arrest 

 

 Cardiac Hx: 

 Technical Quality:      Technically difficult study 

 Contrast 1:    Lumason                     Total Dose (mL):      5 

 Contrast 2:                                Total Dose (mL): 

 

 MEASUREMENTS  (Male / Female) Normal Values 

 2D ECHO 

 LV Diastolic Diameter PLAX        4.6 cm                4.2 - 5.9 / 3.9 - 5.3 cm 

 LV Systolic Diameter PLAX         3.7 cm                 

 IVS Diastolic Thickness           1.4 cm                0.6 - 1.0 / 0.6 - 0.9 cm 

 LVPW Diastolic Thickness          1.1 cm                0.6 - 1.0 / 0.6 - 0.9 cm 

 LV Relative Wall Thickness        0.5                    

 RV Internal Dim ED PLAX           3.2 cm                 

 LA Volume                         101.0 cm???             18 - 58 / 22 - 52 cm??? 

 

 M-MODE 

 Aortic Root Diameter MM           3.1 cm                 

 LA Systolic Diameter MM           3.4 cm                 

 LA Ao Ratio MM                    1.1                    

 

 DOPPLER 

 AV Peak Velocity                  225.3 cm/s             

 AV Peak Gradient                  20.3 mmHg              

 AV Mean Velocity                  170.7 cm/s             

 AV Mean Gradient                  12.6 mmHg              

 AV Velocity Time Integral         42.2 cm                

 AI Peak Velocity                  342.5 cm/s             

 AI Peak Gradient                  46.9 mmHg              

 AI Pressure Half Time             465.8 ms               

 LVOT Peak Velocity                126.6 cm/s             

 LVOT Peak Gradient                6.4 mmHg               

 LVOT Velocity Time Integral       23.9 cm                

 TR Peak Velocity                  317.8 cm/s             

 TR Peak Gradient                  40.4 mmHg              

 

 

 FINDINGS 

 Left Ventricle 

 Moderately increased left ventricular wall thickness. Reduced global left  

 ventricular systolic function. Left ventricular ejection fraction is estimated  

 at 30-35 %.  Mainly anterior septal hypokinesis. 

 

 Right Ventricle 

 Normal right ventricular size and function. Mild pulmonary hypertension. 

 

 Right Atrium 

 Normal right atrial size. Catheter/pacemaker wire in the right atrial cavity. 

 

 Left Atrium 

 Severely increased left atrial volume. Mildly increased left atrial area. 

 

 Mitral Valve 

 Mild mitral annular calcification. Mild mitral regurgitation. 

 

 Aortic Valve 

 Mild aortic stenosis with a peak gradient of 20 mmHg and a mean gradient of 13  

 mmHg. Mild-to-moderate aortic regurgitation. 

 

 Tricuspid Valve 

 Structurally normal tricuspid valve. 

 

 Pulmonic Valve 

 Structurally normal pulmonic valve. 

 

 Pericardium 

 No pericardial effusion. 

 

 Aorta 

 Normal size aortic root and proximal ascending aorta. 

 

 CONCLUSIONS 

 Left ventricular ejection fraction 30-35% with mainly anterior septal  

 hypokinesis 

 Mild mitral regurgitation 

 Mild aortic stenosis 

 No pericardial effusion 

 Previewed by:  

 Dr. Jesus Blanc DO 

 (Electronically Signed) 

 Final Date:      2023 10:00

## 2023-04-16 NOTE — P.PN
Subjective





HISTORY OF PRESENTING ILLNESS


Patient is a pleasant 67-year-old female with history of CAD status post prior 

PCI, hypertension, hyperlipidemia, AICD, obesity, paroxysmal atrial 

fibrillation.  She follows in the office with Dr. Keene from [facility].  

Patient currently intubated and history obtained from family.  Apparently she 

had been in her normal state of health and I gone shopping at [employer].  She was 

using a automatic cart and apparently staff found her more short of breath and 

struggling for air.  EMS was called and found patient unresponsive and CPR 

initiated at around 9:10 AM.  Per chart apparently there was around 15 minutes 

of downtime.  AICD was interrogated with episodes of PMT and nonsustained VT 

lasting for 1-1/2 minutes requiring ATP however no shocks delivered.  These 

episodes occurred at 8am per interrogation. Full strips not available for 

reference.  Per chart patient did not require any defibrillators during the 

code.  Per family patient had been in her normal state of health and had been 

dealing with chronic shortness breath and might have felt somewhat short of 

breath last a however nothing overly out of the ordinary.





Patient had ETT tube placed and currently on 80% FiO2 and remains on 

norepinephrine with ability to come down somewhat on the norepinephrine.  

Initial troponin 0.02 and then 0.06.  White blood cell count 9.7, hemoglobin 

11.4, proBNP 2290.  CTA showed no PE, bilateral lower lobe and right upper lobe 

consolidation possibly related to pneumonia versus aspiration as well as rib 

fractures.





4/16


Patient seen and examined.  Patient not making any significant neurologic 

recovery at this time and being worked up for possible seizure-like activity.  

She was given Ativan for some eyes rolling in the back of her head.  Currently 

unresponsive.  Remains on ventilator with FiO2 down to 60%.  Echo performed with

the EF 30-35% with some more anterior septal hypokinesis.  She is off vasopresso

rs.  Having frequent ectopy and PVCs.





PHYSICAL EXAMINATION


Vital signs reviewed.


CONSTITUTIONAL: No apparent distress, sedated, unresponsive on ventilator. 


HEENT: Head is normocephalic. Pupils are equal, round. Sclerae anicteric. Mucous

membranes of the mouth are moist.  No JVD. No carotid bruit.


CHEST EXAMINATION: Lungs are clear to auscultation. No chest wall tenderness is 

noted on palpation or with deep breathing. 


HEART EXAMINATION: Regular rate and rhythm. S1, S2 heard. No murmurs, gallops or

rub.


ABDOMEN: Soft, nontender. Positive bowel sounds.


EXTREMITIES: 2+ peripheral pulses, no lower extremity edema and no calf 

tenderness.


NEUROLOGIC EXAMINATION: Patient is unresponsive on ventilator





ASSESSMENT


1.  Status post cardiac arrest with approximately 15 minutes of downtime


2.  Episodes of nonsustained VT requiring ATP however occurring one hour prior 

to cardiac arrest.  Unclear if true nonsustained VT or inappropriate ATP of A. 

fib.  Await full interrogation


3.  Non-STEMI related to cardiac arrest


4.  Unresponsive, altered mental status rule out anoxic brain injury


5.  CAD with prior PCI


6.  Paroxysmal atrial fibrillation


7.  Hypertension, currently hypotensive on pressors


8.  Cardiomyopathy EF 30-35%


9.  Chronic systolic heart failure





PLAN


Echo showing EF 30-35% and attempt to obtain prior records of this ejection 

fraction.  Continue supportive care.  Vasopressors have been weaned.  Monitor 

for neurologic improvement.  She did have episode of ATP which apparently was 

one hour prior to her code on her AICD interrogation.  This was a remote 

interrogation and ideally obtain full interrogation if clinically relevant.   

May require ischemic workup if has significant neurologic recovery.








Objective





- Vital Signs


Vital signs: 


                                   Vital Signs











Temp  100.4 F H  04/16/23 08:30


 


Pulse  84   04/16/23 10:00


 


Resp  20   04/16/23 10:00


 


BP  122/58   04/16/23 10:00


 


Pulse Ox  97   04/16/23 10:00


 


FiO2  60   04/16/23 10:00








                                 Intake & Output











 04/15/23 04/16/23 04/16/23





 18:59 06:59 18:59


 


Intake Total 671.006 1286.334 369.505


 


Output Total 232 505 180


 


Balance 555.851 638.334 189.505


 


Weight 81.647 kg  


 


Intake:   


 


  IV  925 250


 


    Sodium Chloride 0.9% 1,  825 150





    000 ml @ 75 mls/hr IV .   





    [id number] STA Rx#:[id number]   


 


    levETIRAcetam IV 1,500 mg  100 100





    In Saline 1 100ml.bag @   





    400 mls/hr IVPB ONCE STA   





    Rx#:[id number]   


 


  Intake, IV Titration 787.851 218.334 119.505





  Amount   


 


    Levofloxacin 750Mg-D5w 150  





    Pmx 750 mg In Dextrose/   





    Water 1 150ml.bag @ 100   





    mls/hr IVPB ONCE STA Rx#:   





    [id number]   


 


    Magnesium Sulfate-D5w Pmx   100





    1 gm In Dextrose/Water 1   





    100ml.bag @ 100 mls/hr   





    IVPB ONCE ONE Rx#:   





    [id number]   


 


    Norepinephrine 32 mg In 20.410 18.334 19.505





    Sodium Chloride 0.9% 218   





    ml @ 0.03 MCG/KG/MIN 1.   





    148 mls/hr IV .Q24H [facility]   





    Rx#:[id number]   


 


    Sodium Chloride 0.9% 1, 450  





    000 ml @ 75 mls/hr IV .   





    [id number] STA Rx#:[id number]   


 


    levETIRAcetam IV 1,500 mg 100  





    In Saline 1 100ml.bag @   





    400 mls/hr IVPB Q12HR [facility]   





    Rx#:[id number]   


 


    propofoL 1,000 mg In 67.441 200.000 





    Empty Bag 1 bag @ 15 MCG/   





    KG/MIN 7.348 mls/hr IV .   





    [id number] [facility] Rx#:[id number]   


 


Output:   


 


  Urine 232 505 180


 


Other:   


 


  Voiding Method Indwelling Catheter Indwelling Catheter 








                       ABP, PAP, CO, CI - Last Documented











Arterial Blood Pressure        128/51

















- Labs


CBC & Chem 7: 


                                 04/16/23 05:29





                                 04/16/23 05:29


Labs: 


                  Abnormal Lab Results - Last 24 Hours (Table)











  04/15/23 04/15/23 04/15/23 Range/Units





  12:10 13:34 14:08 


 


RBC     (3.80-5.40)  m/uL


 


Hgb     (11.4-16.0)  gm/dL


 


Neutrophils #     (1.3-7.7)  k/uL


 


Lymphocytes #     (1.0-4.8)  k/uL


 


ABG pH  7.17 L*    (7.35-7.45)  


 


ABG pCO2  53 H    (35-45)  mmHg


 


ABG pO2  75 L    ()  mmHg


 


ABG HCO3  19 L    (21-25)  mmol/L


 


ABG O2 Saturation  91.4 L    (94-97)  %


 


Chloride     ()  mmol/L


 


Carbon Dioxide     (22-30)  mmol/L


 


BUN     (7-17)  mg/dL


 


Glucose     (74-99)  mg/dL


 


POC Glucose (mg/dL)    199 H  ()  mg/dL


 


Plasma Lactic Acid Kyaw   2.9 H*   (0.7-2.0)  mmol/L


 


Calcium     (8.4-10.2)  mg/dL


 


AST     (14-36)  U/L


 


Troponin I     (0.000-0.034)  ng/mL


 


Total Protein     (6.3-8.2)  g/dL


 


Albumin     (3.5-5.0)  g/dL














  04/15/23 04/15/23 04/15/23 Range/Units





  15:16 15:24 18:09 


 


RBC     (3.80-5.40)  m/uL


 


Hgb     (11.4-16.0)  gm/dL


 


Neutrophils #     (1.3-7.7)  k/uL


 


Lymphocytes #     (1.0-4.8)  k/uL


 


ABG pH  7.25 L    (7.35-7.45)  


 


ABG pCO2     (35-45)  mmHg


 


ABG pO2  158 H    ()  mmHg


 


ABG HCO3  19 L    (21-25)  mmol/L


 


ABG O2 Saturation  97.5 H    (94-97)  %


 


Chloride     ()  mmol/L


 


Carbon Dioxide     (22-30)  mmol/L


 


BUN     (7-17)  mg/dL


 


Glucose     (74-99)  mg/dL


 


POC Glucose (mg/dL)    136 H  ()  mg/dL


 


Plasma Lactic Acid Kyaw     (0.7-2.0)  mmol/L


 


Calcium     (8.4-10.2)  mg/dL


 


AST     (14-36)  U/L


 


Troponin I   0.069 H*   (0.000-0.034)  ng/mL


 


Total Protein     (6.3-8.2)  g/dL


 


Albumin     (3.5-5.0)  g/dL














  04/15/23 04/15/23 04/16/23 Range/Units





  22:36 23:54 05:29 


 


RBC    3.44 L  (3.80-5.40)  m/uL


 


Hgb    10.8 L  (11.4-16.0)  gm/dL


 


Neutrophils #    7.8 H  (1.3-7.7)  k/uL


 


Lymphocytes #    0.8 L  (1.0-4.8)  k/uL


 


ABG pH     (7.35-7.45)  


 


ABG pCO2     (35-45)  mmHg


 


ABG pO2     ()  mmHg


 


ABG HCO3     (21-25)  mmol/L


 


ABG O2 Saturation     (94-97)  %


 


Chloride     ()  mmol/L


 


Carbon Dioxide     (22-30)  mmol/L


 


BUN     (7-17)  mg/dL


 


Glucose     (74-99)  mg/dL


 


POC Glucose (mg/dL)   150 H   ()  mg/dL


 


Plasma Lactic Acid Kyaw     (0.7-2.0)  mmol/L


 


Calcium     (8.4-10.2)  mg/dL


 


AST     (14-36)  U/L


 


Troponin I  0.086 H*    (0.000-0.034)  ng/mL


 


Total Protein     (6.3-8.2)  g/dL


 


Albumin     (3.5-5.0)  g/dL














  04/16/23 04/16/23 04/16/23 Range/Units





  05:29 05:29 06:25 


 


RBC     (3.80-5.40)  m/uL


 


Hgb     (11.4-16.0)  gm/dL


 


Neutrophils #     (1.3-7.7)  k/uL


 


Lymphocytes #     (1.0-4.8)  k/uL


 


ABG pH     (7.35-7.45)  


 


ABG pCO2    34 L  (35-45)  mmHg


 


ABG pO2     ()  mmHg


 


ABG HCO3    20 L  (21-25)  mmol/L


 


ABG O2 Saturation     (94-97)  %


 


Chloride  112 H    ()  mmol/L


 


Carbon Dioxide  18 L    (22-30)  mmol/L


 


BUN  19 H    (7-17)  mg/dL


 


Glucose  146 H    (74-99)  mg/dL


 


POC Glucose (mg/dL)     ()  mg/dL


 


Plasma Lactic Acid Kyaw     (0.7-2.0)  mmol/L


 


Calcium  7.7 L    (8.4-10.2)  mg/dL


 


AST  45 H    (14-36)  U/L


 


Troponin I   0.091 H*   (0.000-0.034)  ng/mL


 


Total Protein  6.0 L    (6.3-8.2)  g/dL


 


Albumin  3.4 L    (3.5-5.0)  g/dL














  04/16/23 Range/Units





  07:00 


 


RBC   (3.80-5.40)  m/uL


 


Hgb   (11.4-16.0)  gm/dL


 


Neutrophils #   (1.3-7.7)  k/uL


 


Lymphocytes #   (1.0-4.8)  k/uL


 


ABG pH   (7.35-7.45)  


 


ABG pCO2   (35-45)  mmHg


 


ABG pO2   ()  mmHg


 


ABG HCO3   (21-25)  mmol/L


 


ABG O2 Saturation   (94-97)  %


 


Chloride   ()  mmol/L


 


Carbon Dioxide   (22-30)  mmol/L


 


BUN   (7-17)  mg/dL


 


Glucose   (74-99)  mg/dL


 


POC Glucose (mg/dL)  159 H  ()  mg/dL


 


Plasma Lactic Acid Kyaw   (0.7-2.0)  mmol/L


 


Calcium   (8.4-10.2)  mg/dL


 


AST   (14-36)  U/L


 


Troponin I   (0.000-0.034)  ng/mL


 


Total Protein   (6.3-8.2)  g/dL


 


Albumin   (3.5-5.0)  g/dL








                      Microbiology - Last 24 Hours (Table)











 04/15/23 11:19 Legionella Culture - Preliminary





 Sputum 


 


 04/15/23 11:19 Sputum Culture - Preliminary





 Sputum

## 2023-04-16 NOTE — EEG
ELECTROENCEPHALOGRAM REPORT



CLINICAL HISTORY:

This is a 67-year-old woman, who had reported cardiac arrest outside hospital 
and has

twitches of extremities.  The video EEG is obtained to evaluate for seizure

epileptiform activity.



RELEVANT MEDICATION:

Keppra, Ativan and IV propofol.



EEG TYPE:

A routine 21-channel EEG is performed with video using the 10/20 electrode 
placement

system.



DESCRIPTION:

The patient is intubated on a ventilator.  The background is diffusely 
suppressed.  

But upon going up on sensitivity, background is low voltage of 1 to 2 hertz 
activity.  There was no

physiological sleep architecture.



There is no focal slowing.



Interictal and ictal is none.



ACTIVATION PROCEDURE:

Photic stimulation did not evoke a posterior driving response.  There is no 
abnormality

during the photic stimulation.



Hyperventilation is not performed.



CLINICAL INTERPRETATION:

This is an abnormal routine EEG.  The diffuse suppression is due to either 
medication induced (Ativan and IV Propofol) or due anoxia.

The background slowing is suggestive of severe encephalopathy.  

Otherwise, there is no focal slowing, epileptiform discharge or

seizure on the EEG.  Clinical correlation is recommended





MMODL / IJN: [id number] / Job#: [id number]

MTDD

## 2023-04-17 LAB
ANION GAP SERPL CALC-SCNC: 3 MMOL/L
BASOPHILS # BLD AUTO: 0 K/UL (ref 0–0.2)
BASOPHILS NFR BLD AUTO: 0 %
BUN SERPL-SCNC: 18 MG/DL (ref 7–17)
CALCIUM SPEC-MCNC: 7.2 MG/DL (ref 8.4–10.2)
CHLORIDE SERPL-SCNC: 113 MMOL/L (ref 98–107)
CO2 BLDA-SCNC: 24 MMOL/L (ref 19–24)
CO2 SERPL-SCNC: 22 MMOL/L (ref 22–30)
EOSINOPHIL # BLD AUTO: 0 K/UL (ref 0–0.7)
EOSINOPHIL NFR BLD AUTO: 0 %
ERYTHROCYTE [DISTWIDTH] IN BLOOD BY AUTOMATED COUNT: 3.01 M/UL (ref 3.8–5.4)
ERYTHROCYTE [DISTWIDTH] IN BLOOD: 14.7 % (ref 11.5–15.5)
GLUCOSE BLD-MCNC: 113 MG/DL (ref 70–110)
GLUCOSE BLD-MCNC: 130 MG/DL (ref 70–110)
GLUCOSE BLD-MCNC: 145 MG/DL (ref 70–110)
GLUCOSE BLD-MCNC: 152 MG/DL (ref 70–110)
GLUCOSE BLD-MCNC: 159 MG/DL (ref 70–110)
GLUCOSE SERPL-MCNC: 156 MG/DL (ref 74–99)
HCO3 BLDA-SCNC: 23 MMOL/L (ref 21–25)
HCT VFR BLD AUTO: 29.3 % (ref 34–46)
HGB BLD-MCNC: 9.5 GM/DL (ref 11.4–16)
LYMPHOCYTES # SPEC AUTO: 0.5 K/UL (ref 1–4.8)
LYMPHOCYTES NFR SPEC AUTO: 8 %
MAGNESIUM SPEC-SCNC: 2.3 MG/DL (ref 1.6–2.3)
MCH RBC QN AUTO: 31.4 PG (ref 25–35)
MCHC RBC AUTO-ENTMCNC: 32.3 G/DL (ref 31–37)
MCV RBC AUTO: 97.2 FL (ref 80–100)
MONOCYTES # BLD AUTO: 0.3 K/UL (ref 0–1)
MONOCYTES NFR BLD AUTO: 5 %
NEUTROPHILS # BLD AUTO: 5.5 K/UL (ref 1.3–7.7)
NEUTROPHILS NFR BLD AUTO: 85 %
PCO2 BLDA: 42 MMHG (ref 35–45)
PH BLDA: 7.34 [PH] (ref 7.35–7.45)
PLATELET # BLD AUTO: 153 K/UL (ref 150–450)
PO2 BLDA: 89 MMHG (ref 83–108)
POTASSIUM SERPL-SCNC: 4 MMOL/L (ref 3.5–5.1)
SODIUM SERPL-SCNC: 138 MMOL/L (ref 137–145)
WBC # BLD AUTO: 6.4 K/UL (ref 3.8–10.6)

## 2023-04-17 PROCEDURE — 3E0G76Z INTRODUCTION OF NUTRITIONAL SUBSTANCE INTO UPPER GI, VIA NATURAL OR ARTIFICIAL OPENING: ICD-10-PCS

## 2023-04-17 RX ADMIN — POTASSIUM BICARBONATE SCH MEQ: 782 TABLET, EFFERVESCENT ORAL at 01:09

## 2023-04-17 RX ADMIN — PIPERACILLIN AND TAZOBACTAM SCH MLS/HR: 3; .375 INJECTION, POWDER, FOR SOLUTION INTRAVENOUS at 10:30

## 2023-04-17 RX ADMIN — APIXABAN SCH MG: 5 TABLET, FILM COATED ORAL at 20:05

## 2023-04-17 RX ADMIN — ACETAMINOPHEN PRN MG: 325 TABLET, FILM COATED ORAL at 15:50

## 2023-04-17 RX ADMIN — IPRATROPIUM BROMIDE AND ALBUTEROL SULFATE SCH ML: .5; 3 SOLUTION RESPIRATORY (INHALATION) at 00:16

## 2023-04-17 RX ADMIN — SODIUM CHLORIDE SCH MLS/HR: 3 INJECTION, SOLUTION INTRAVENOUS at 17:14

## 2023-04-17 RX ADMIN — CHLORHEXIDINE GLUCONATE SCH ML: 1.2 RINSE ORAL at 08:12

## 2023-04-17 RX ADMIN — PANTOPRAZOLE SODIUM SCH MG: 40 INJECTION, POWDER, FOR SOLUTION INTRAVENOUS at 08:02

## 2023-04-17 RX ADMIN — ACETAMINOPHEN PRN MG: 325 TABLET, FILM COATED ORAL at 03:45

## 2023-04-17 RX ADMIN — IPRATROPIUM BROMIDE AND ALBUTEROL SULFATE SCH ML: .5; 3 SOLUTION RESPIRATORY (INHALATION) at 03:59

## 2023-04-17 RX ADMIN — IPRATROPIUM BROMIDE AND ALBUTEROL SULFATE SCH ML: .5; 3 SOLUTION RESPIRATORY (INHALATION) at 08:02

## 2023-04-17 RX ADMIN — POTASSIUM BICARBONATE SCH MEQ: 782 TABLET, EFFERVESCENT ORAL at 00:19

## 2023-04-17 RX ADMIN — MIDAZOLAM SCH: 5 INJECTION INTRAMUSCULAR; INTRAVENOUS at 23:35

## 2023-04-17 RX ADMIN — IPRATROPIUM BROMIDE AND ALBUTEROL SULFATE SCH ML: .5; 3 SOLUTION RESPIRATORY (INHALATION) at 12:11

## 2023-04-17 RX ADMIN — IPRATROPIUM BROMIDE AND ALBUTEROL SULFATE SCH ML: .5; 3 SOLUTION RESPIRATORY (INHALATION) at 16:08

## 2023-04-17 RX ADMIN — ACETAMINOPHEN PRN MG: 325 TABLET, FILM COATED ORAL at 22:08

## 2023-04-17 RX ADMIN — LEVETIRACETAM SCH MLS/HR: 15 INJECTION INTRAVENOUS at 08:02

## 2023-04-17 RX ADMIN — METOPROLOL TARTRATE SCH MG: 25 TABLET, FILM COATED ORAL at 08:12

## 2023-04-17 RX ADMIN — APIXABAN SCH MG: 5 TABLET, FILM COATED ORAL at 08:12

## 2023-04-17 RX ADMIN — IPRATROPIUM BROMIDE AND ALBUTEROL SULFATE SCH ML: .5; 3 SOLUTION RESPIRATORY (INHALATION) at 19:51

## 2023-04-17 RX ADMIN — ATORVASTATIN CALCIUM SCH MG: 80 TABLET, FILM COATED ORAL at 08:12

## 2023-04-17 RX ADMIN — INSULIN ASPART SCH: 100 INJECTION, SOLUTION INTRAVENOUS; SUBCUTANEOUS at 18:32

## 2023-04-17 RX ADMIN — PIPERACILLIN AND TAZOBACTAM SCH MLS/HR: 3; .375 INJECTION, POWDER, FOR SOLUTION INTRAVENOUS at 18:23

## 2023-04-17 RX ADMIN — PIPERACILLIN AND TAZOBACTAM SCH MLS/HR: 3; .375 INJECTION, POWDER, FOR SOLUTION INTRAVENOUS at 01:10

## 2023-04-17 RX ADMIN — NOREPINEPHRINE BITARTRATE SCH: 1 INJECTION, SOLUTION, CONCENTRATE INTRAVENOUS at 11:47

## 2023-04-17 RX ADMIN — METOPROLOL TARTRATE SCH MG: 25 TABLET, FILM COATED ORAL at 20:05

## 2023-04-17 RX ADMIN — LEVETIRACETAM SCH MLS/HR: 15 INJECTION INTRAVENOUS at 20:05

## 2023-04-17 RX ADMIN — INSULIN ASPART SCH: 100 INJECTION, SOLUTION INTRAVENOUS; SUBCUTANEOUS at 23:37

## 2023-04-17 RX ADMIN — CHLORHEXIDINE GLUCONATE SCH ML: 1.2 RINSE ORAL at 20:05

## 2023-04-17 RX ADMIN — INSULIN ASPART SCH UNIT: 100 INJECTION, SOLUTION INTRAVENOUS; SUBCUTANEOUS at 05:08

## 2023-04-17 RX ADMIN — INSULIN ASPART SCH UNIT: 100 INJECTION, SOLUTION INTRAVENOUS; SUBCUTANEOUS at 11:53

## 2023-04-17 NOTE — P.PN
Subjective


Progress Note Date: 04/17/23





PROGRESS NOTE


The patient is a 67-year-old female with a known history of CAD, cardiomyopathy,

ICD implantation, atrial fibrillation who presented with cardiac arrest, CPR and

downtime of about 15 minutes who is intubated, unresponsive.  Her echocardiogram

showed an ejection fraction of 30-35% with segmental wall motion abnormality.  

Her blood pressure has been stable but she is having episodes of ventricular 

ectopic activity and short burst of nonsustained ventricular tachycardia.  

According to the device interrogation no defibrillation or shock were done.  She

had an episode of possible nonsustained VT treated with ATP.  She is off 

vasopressors.  She is in sinus mechanism.  Computed tomography scan of the head 

showed no acute changes





Medications:


Furosemide 1, insulin, Keppra, Ativan, Zosyn,Eliquis 5 mg twice a day


PHYSICAL EXAMINATION:


Blood pressure 111/50 heart rate 105, intubated and unresponsive.  Pupils 

dilated and fixed


LUNGS: Clear to auscultation, anteriorly


HEART: Regular rate and rhythm, S1, S2. No S3.  systolic ejection murmur


ABDOMEN: Soft, positive bowel sounds, no organomegaly


EXTREMETIES: No edema





LAB:


Hemoglobin 9.5, potassium 4.0, BUN 18, creatinine 0.73, magnesium 2.3


IMPRESSION:


1.  Status post cardiopulmonary arrest with down time of 15 minutes with 

evidence of anoxic encephalopathy


2.  History of ischemic heart disease with severely impaired systolic function 

and ICD implantation


3.  History of prior PCI


4.  Paroxysmal atrial fibrillation


5.  History of hypertension


6.  History of hyperlipidemia





PLAN:


1.  Restart beta blocker


2.  Restart statin


3.  Follow the arrhythmia


4.  Prognosis remains poor in view of the neurological status, depending on her 

progress further recommendations will be made





 








Objective





- Vital Signs


Vital signs: 


                                   Vital Signs











Temp  99.8 F H  04/17/23 05:00


 


Pulse  106 H  04/17/23 06:00


 


Resp  20   04/17/23 06:00


 


BP  133/78   04/16/23 21:00


 


Pulse Ox  99   04/17/23 06:00


 


FiO2  60   04/17/23 06:00








                                 Intake & Output











 04/16/23 04/16/23 04/17/23





 06:59 18:59 06:59


 


Intake Total 8697.462 7039.021 1307.762


 


Output Total 505 1505 550


 


Balance 638.334 213.021 757.762


 


Weight   84 kg


 


Intake:   


 


   1275 1025


 


    Magnesium Sulfate-D5w Pmx   100





    1 gm In Dextrose/Water 1   





    100ml.bag @ 100 mls/hr   





    IVPB ONCE ONE Rx#:   





    [id number]   


 


    Sodium Chloride 0.9% 1, 825 675 825





    000 ml @ 75 mls/hr IV .   





    [id number] STA Rx#:[id number]   


 


    Sodium Chloride 0.9% 500  500 





    ml 500 ml @ 999 mls/hr IV   





    .Q31M ONE Rx#:[id number]   


 


    levETIRAcetam IV 1,500 mg 100 100 100





    In Saline 1 100ml.bag @   





    400 mls/hr IVPB ONCE STA   





    Rx#:[id number]   


 


  Intake, IV Titration 218.334 353.021 47.762





  Amount   


 


    Magnesium Sulfate-D5w Pmx  100 





    1 gm In Dextrose/Water 1   





    100ml.bag @ 100 mls/hr   





    IVPB ONCE ONE Rx#:   





    [id number]   


 


    Norepinephrine 32 mg In 18.334 19.505 





    Sodium Chloride 0.9% 218   





    ml @ 0.03 MCG/KG/MIN 1.   





    148 mls/hr IV .Q24H [facility]   





    Rx#:[id number]   


 


    Piperacillin-Tazobactam 3  100 





    .375 gm In Sodium   





    Chloride 0.9% 100 ml @ 25   





    mls/hr IVPB Q8H [facility] Rx#:   





    [id number]   


 


    propofoL 1,000 mg In 200.000 133.516 47.762





    Empty Bag 1 bag @ 15 MCG/   





    KG/MIN 7.348 mls/hr IV .   





    [id number] [facility] Rx#:[id number]   


 


  Tube Feeding  60 110


 


  Other  30 125


 


Output:   


 


  Urine 505 1505 550


 


Other:   


 


  Voiding Method Indwelling Catheter Indwelling Catheter Indwelling Catheter








                       ABP, PAP, CO, CI - Last Documented











Arterial Blood Pressure        111/53

















- Labs


CBC & Chem 7: 


                                 04/17/23 04:15





                                 04/17/23 04:15


Labs: 


                  Abnormal Lab Results - Last 24 Hours (Table)











  04/16/23 04/16/23 04/16/23 Range/Units





  07:00 11:09 11:27 


 


RBC     (3.80-5.40)  m/uL


 


Hgb     (11.4-16.0)  gm/dL


 


Hct     (34.0-46.0)  %


 


Lymphocytes #     (1.0-4.8)  k/uL


 


ABG pH     (7.35-7.45)  


 


Chloride     ()  mmol/L


 


Carbon Dioxide     (22-30)  mmol/L


 


BUN     (7-17)  mg/dL


 


Glucose     (74-99)  mg/dL


 


POC Glucose (mg/dL)  159 H   132 H  ()  mg/dL


 


Calcium     (8.4-10.2)  mg/dL


 


Urine Protein   1+ H   (Negative)  


 


Urine Mucus   Rare H   (None)  /hpf














  04/16/23 04/16/23 04/16/23 Range/Units





  18:17 22:50 23:16 


 


RBC     (3.80-5.40)  m/uL


 


Hgb     (11.4-16.0)  gm/dL


 


Hct     (34.0-46.0)  %


 


Lymphocytes #     (1.0-4.8)  k/uL


 


ABG pH     (7.35-7.45)  


 


Chloride   113 H   ()  mmol/L


 


Carbon Dioxide   20 L   (22-30)  mmol/L


 


BUN   18 H   (7-17)  mg/dL


 


Glucose   149 H   (74-99)  mg/dL


 


POC Glucose (mg/dL)  130 H   147 H  ()  mg/dL


 


Calcium   7.1 L   (8.4-10.2)  mg/dL


 


Urine Protein     (Negative)  


 


Urine Mucus     (None)  /hpf














  04/17/23 04/17/23 04/17/23 Range/Units





  04:15 04:15 04:47 


 


RBC   3.01 L   (3.80-5.40)  m/uL


 


Hgb   9.5 L   (11.4-16.0)  gm/dL


 


Hct   29.3 L   (34.0-46.0)  %


 


Lymphocytes #   0.5 L   (1.0-4.8)  k/uL


 


ABG pH    7.34 L  (7.35-7.45)  


 


Chloride  113 H    ()  mmol/L


 


Carbon Dioxide     (22-30)  mmol/L


 


BUN  18 H    (7-17)  mg/dL


 


Glucose  156 H    (74-99)  mg/dL


 


POC Glucose (mg/dL)     ()  mg/dL


 


Calcium  7.2 L    (8.4-10.2)  mg/dL


 


Urine Protein     (Negative)  


 


Urine Mucus     (None)  /hpf














  04/17/23 Range/Units





  05:06 


 


RBC   (3.80-5.40)  m/uL


 


Hgb   (11.4-16.0)  gm/dL


 


Hct   (34.0-46.0)  %


 


Lymphocytes #   (1.0-4.8)  k/uL


 


ABG pH   (7.35-7.45)  


 


Chloride   ()  mmol/L


 


Carbon Dioxide   (22-30)  mmol/L


 


BUN   (7-17)  mg/dL


 


Glucose   (74-99)  mg/dL


 


POC Glucose (mg/dL)  159 H  ()  mg/dL


 


Calcium   (8.4-10.2)  mg/dL


 


Urine Protein   (Negative)  


 


Urine Mucus   (None)  /hpf








                      Microbiology - Last 24 Hours (Table)











 04/15/23 11:19 Gram Stain - Preliminary





 Sputum Sputum Culture - Preliminary


 


 04/15/23 13:34 Blood Culture - Preliminary





 Blood    No Growth after 24 hours


 


 04/15/23 13:34 Blood Culture - Preliminary





 Blood    No Growth after 24 hours


 


 04/15/23 11:19 Legionella Culture - Preliminary





 Sputum

## 2023-04-17 NOTE — CT
EXAMINATION TYPE: CT brain wo con

 

DATE OF EXAM: 4/17/2023

 

HISTORY: Headache. Altered mental status. Cardiac arrest.

 

CT DLP: 1165 mGycm.  Automated Exposure Control for Dose Reduction was Utilized.

 

TECHNIQUE: CT scan of the head is performed without contrast.

 

COMPARISON: CT brain 2 days earlier..

 

FINDINGS:   There is no acute intracranial hemorrhage or midline shift identified. There is mild diff
use ventricular and sulcal prominence redemonstrated. Gray-white matter differentiation fairly well-m
aintained.  Bilateral basal ganglia calcifications redemonstrated along with some bilateral cerebella
r calcifications again seen. Nasal septum remains deviated to right of midline. Bilateral aphakia red
emonstrated. Resolved air fluid level left maxillary sinus. More prominent dependent fluid bilateral 
sphenoid sinuses.

 

 

IMPRESSION:  No acute intracranial hemorrhage or midline shift.  There is mild diffuse cerebral atrop
hy redemonstrated. Resolved left maxillary acute sinusitis. Slightly worsening bilateral sphenoid acu
te sinusitis.

## 2023-04-17 NOTE — XR
EXAMINATION TYPE: XR chest 1V portable

 

DATE OF EXAM: 4/17/2023

 

COMPARISON: 4/16/2023

 

HISTORY: Tube placement

 

TECHNIQUE: Single frontal view of the chest is obtained.

 

FINDINGS:  

 

There is an endotracheal tube terminating approximately 3 cm from the level of the guzman, orogastric
 tube which extends past the diaphragm and terminates outside the field of view, and a left-sided AIC
D. There is partially visualized left shoulder orthopedic hardware.

 

The heart size is unchanged. The cardiomediastinal silhouette is unchanged.  There is redemonstration
 of bibasilar airspace opacities with obscuration of both costophrenic angles. There is distention of
 the pulmonary with increased perihilar interstitial opacities.

 

IMPRESSION:  

 

Stable bilateral interstitial and airspace opacity with small bilateral pleural effusions. Findings m
ay relate to pulmonary edema or an infectious etiology.

## 2023-04-17 NOTE — P.PN
Subjective


Progress Note Date: 04/16/23








Patient is a 67-year-old male with a known history of chronic atrial 

fibrillation on anticoagulation with Eliquis, cardiomyopathy status post ICD 

placement, COPD on home oxygen at 4 L via nasal cannula, hypertension, 

anxiety/depression and history of femoral neck fracture s/p repair in January 2023 was brought to the hospital by EMS status postcardiac arrest.  Patient was 

at [facility] where she was found to have worsening shortness of breath and became 

unresponsive on a motorized scooter..  Patient underwent CPR for about 15 

minutes by EMS and was given 3 doses of epinephrine with return of spontaneous 

circulation and was intubated.  Patient was brought to ER for evaluation.  

Patient was unresponsive and was able to provide any history.


On arrival chest x-ray showed cardiomegaly and mild pulmonary vascular 

congestion.  Correlate with BNP for congestive heart failure.  Right basilar 

patchy airspace opacities may represent pulmonary edema versus infiltrate.  

Endotracheal tube in appropriate position.  Possible NG tube terminating in the 

mid esophagus.


CT head showed no acute intracranial process.  Paranasal sinus disease with air-

fluid level within the left maxillary sinus.  Correlate for acute sinusitis.


Chest CTA showed no evidence of PE.  Bilateral lower lobe and right upper lobe 

consolidation with a scattered multifocal groundglass opacities throughout the 

lungs.  Findings are compatible with pneumonia possibly aspiration.


Multiple acute minimally displaced rib fractures likely related to CPR in the 

setting of cardiac arrest.  Remote to subacute bilateral rib fractures also demo

nstrated.  No pneumothorax.  Cardiomegaly.


Laboratory data showed WBC 9.7 hemoglobin 11.4 platelets 246 and D-dimer 3.1


Initial ABG showed pH of 7.17 PCO2 30 and PO2 75


Sodium 135, potassium 4.3, chloride 104, bicarb is 17 BUN 12 and creatinine 0.76

and blood sugar was 325 and lactic acid 2.9 AST 57 ALT 14 alk phos 73 and 

troponin x1 negative


proBNP 2290


Influenza A B RSV and COVID-19 PCR not detected.





4/16/2023


Patient is in the MICU.  Remains mechanical ventilator.  Off pressor support.


Otherwise patient remains unresponsive.  EEG was ordered and neurology is on 

board.


2D echocardiogram showed ejection fraction 30 to 35% with anterior septal 

hypokinesis.  AICD C interrogation was done.


Cardiology neurology and pulmonary is on board.


Chest x-ray showed persistent right midlung and bibasilar multifocal acute 

infiltrates without edema.  No change from 1 day.


Patient remains on antibiotics Zosyn.  NG tube in place.





Current medications reviewed.








Objective





- Vital Signs


Vital signs: 


                                   Vital Signs











Temp  100.4 F H  04/16/23 08:30


 


Pulse  84   04/16/23 10:00


 


Resp  20   04/16/23 10:00


 


BP  122/58   04/16/23 10:00


 


Pulse Ox  97   04/16/23 10:00


 


FiO2  60   04/16/23 10:00








                                 Intake & Output











 04/15/23 04/16/23 04/16/23





 18:59 06:59 18:59


 


Intake Total 289.593 1485.334 369.505


 


Output Total 232 505 180


 


Balance 555.851 638.334 189.505


 


Weight 81.647 kg  


 


Intake:   


 


  IV  925 250


 


    Sodium Chloride 0.9% 1,  825 150





    000 ml @ 75 mls/hr IV .   





    [id number] STA Rx#:[id number]   


 


    levETIRAcetam IV 1,500 mg  100 100





    In Saline 1 100ml.bag @   





    400 mls/hr IVPB ONCE STA   





    Rx#:[id number]   


 


  Intake, IV Titration 787.851 218.334 119.505





  Amount   


 


    Levofloxacin 750Mg-D5w 150  





    Pmx 750 mg In Dextrose/   





    Water 1 150ml.bag @ 100   





    mls/hr IVPB ONCE STA Rx#:   





    [id number]   


 


    Magnesium Sulfate-D5w Pmx   100





    1 gm In Dextrose/Water 1   





    100ml.bag @ 100 mls/hr   





    IVPB ONCE ONE Rx#:   





    [id number]   


 


    Norepinephrine 32 mg In 20.410 18.334 19.505





    Sodium Chloride 0.9% 218   





    ml @ 0.03 MCG/KG/MIN 1.   





    148 mls/hr IV .Q24H [facility]   





    Rx#:[id number]   


 


    Sodium Chloride 0.9% 1, 450  





    000 ml @ 75 mls/hr IV .   





    [id number] STA Rx#:[id number]   


 


    levETIRAcetam IV 1,500 mg 100  





    In Saline 1 100ml.bag @   





    400 mls/hr IVPB Q12HR [facility]   





    Rx#:[id number]   


 


    propofoL 1,000 mg In 67.441 200.000 





    Empty Bag 1 bag @ 15 MCG/   





    KG/MIN 7.348 mls/hr IV .   





    [id number] MIKHAIL Rx#:[id number]   


 


Output:   


 


  Urine 232 505 180


 


Other:   


 


  Voiding Method Indwelling Catheter Indwelling Catheter 








                       ABP, PAP, CO, CI - Last Documented











Arterial Blood Pressure        128/51

















- Exam





PHYSICAL EXAMINATION: 


Patient is on mechanical ventilator.


HEENT: Normocephalic. Neck is supple. Pupils reactive. Nostrils clear. Oral 

cavity is moist. 


Neck reveals no JVD, carotid bruits, or thyromegaly. 


CHEST EXAMINATION: Trachea is central. Symmetrical expansion.  Bibasilar 

diminished sounds and coarse breath sounds.  No wheezing.


CARDIAC: Normal S1, S2 with no gallops. No murmurs 


ABDOMEN: Soft. Bowel sounds present.  Nontender.  No organomegaly. No abdominal 

bruits. 


Extremities: Trace lower extremity edema.  No clubbing or cyanosis


Neurologically patient is on mechanical ventilator.


Skin: No rash or skin lesions. 


Psychiatric: Could not be assessed at,


Musculoskeletal: No joint swelling or deformity.








- Labs


CBC & Chem 7: 


                                 04/16/23 05:29





                                 04/16/23 22:50


Labs: 


                  Abnormal Lab Results - Last 24 Hours (Table)











  04/15/23 04/15/23 04/15/23 Range/Units





  12:10 13:34 14:08 


 


RBC     (3.80-5.40)  m/uL


 


Hgb     (11.4-16.0)  gm/dL


 


Neutrophils #     (1.3-7.7)  k/uL


 


Lymphocytes #     (1.0-4.8)  k/uL


 


ABG pH  7.17 L*    (7.35-7.45)  


 


ABG pCO2  53 H    (35-45)  mmHg


 


ABG pO2  75 L    ()  mmHg


 


ABG HCO3  19 L    (21-25)  mmol/L


 


ABG O2 Saturation  91.4 L    (94-97)  %


 


Chloride     ()  mmol/L


 


Carbon Dioxide     (22-30)  mmol/L


 


BUN     (7-17)  mg/dL


 


Glucose     (74-99)  mg/dL


 


POC Glucose (mg/dL)    199 H  ()  mg/dL


 


Plasma Lactic Acid Kyaw   2.9 H*   (0.7-2.0)  mmol/L


 


Calcium     (8.4-10.2)  mg/dL


 


AST     (14-36)  U/L


 


Troponin I     (0.000-0.034)  ng/mL


 


Total Protein     (6.3-8.2)  g/dL


 


Albumin     (3.5-5.0)  g/dL














  04/15/23 04/15/23 04/15/23 Range/Units





  15:16 15:24 18:09 


 


RBC     (3.80-5.40)  m/uL


 


Hgb     (11.4-16.0)  gm/dL


 


Neutrophils #     (1.3-7.7)  k/uL


 


Lymphocytes #     (1.0-4.8)  k/uL


 


ABG pH  7.25 L    (7.35-7.45)  


 


ABG pCO2     (35-45)  mmHg


 


ABG pO2  158 H    ()  mmHg


 


ABG HCO3  19 L    (21-25)  mmol/L


 


ABG O2 Saturation  97.5 H    (94-97)  %


 


Chloride     ()  mmol/L


 


Carbon Dioxide     (22-30)  mmol/L


 


BUN     (7-17)  mg/dL


 


Glucose     (74-99)  mg/dL


 


POC Glucose (mg/dL)    136 H  ()  mg/dL


 


Plasma Lactic Acid Kyaw     (0.7-2.0)  mmol/L


 


Calcium     (8.4-10.2)  mg/dL


 


AST     (14-36)  U/L


 


Troponin I   0.069 H*   (0.000-0.034)  ng/mL


 


Total Protein     (6.3-8.2)  g/dL


 


Albumin     (3.5-5.0)  g/dL














  04/15/23 04/15/23 04/16/23 Range/Units





  22:36 23:54 05:29 


 


RBC    3.44 L  (3.80-5.40)  m/uL


 


Hgb    10.8 L  (11.4-16.0)  gm/dL


 


Neutrophils #    7.8 H  (1.3-7.7)  k/uL


 


Lymphocytes #    0.8 L  (1.0-4.8)  k/uL


 


ABG pH     (7.35-7.45)  


 


ABG pCO2     (35-45)  mmHg


 


ABG pO2     ()  mmHg


 


ABG HCO3     (21-25)  mmol/L


 


ABG O2 Saturation     (94-97)  %


 


Chloride     ()  mmol/L


 


Carbon Dioxide     (22-30)  mmol/L


 


BUN     (7-17)  mg/dL


 


Glucose     (74-99)  mg/dL


 


POC Glucose (mg/dL)   150 H   ()  mg/dL


 


Plasma Lactic Acid Kyaw     (0.7-2.0)  mmol/L


 


Calcium     (8.4-10.2)  mg/dL


 


AST     (14-36)  U/L


 


Troponin I  0.086 H*    (0.000-0.034)  ng/mL


 


Total Protein     (6.3-8.2)  g/dL


 


Albumin     (3.5-5.0)  g/dL














  04/16/23 04/16/23 04/16/23 Range/Units





  05:29 05:29 06:25 


 


RBC     (3.80-5.40)  m/uL


 


Hgb     (11.4-16.0)  gm/dL


 


Neutrophils #     (1.3-7.7)  k/uL


 


Lymphocytes #     (1.0-4.8)  k/uL


 


ABG pH     (7.35-7.45)  


 


ABG pCO2    34 L  (35-45)  mmHg


 


ABG pO2     ()  mmHg


 


ABG HCO3    20 L  (21-25)  mmol/L


 


ABG O2 Saturation     (94-97)  %


 


Chloride  112 H    ()  mmol/L


 


Carbon Dioxide  18 L    (22-30)  mmol/L


 


BUN  19 H    (7-17)  mg/dL


 


Glucose  146 H    (74-99)  mg/dL


 


POC Glucose (mg/dL)     ()  mg/dL


 


Plasma Lactic Acid Kyaw     (0.7-2.0)  mmol/L


 


Calcium  7.7 L    (8.4-10.2)  mg/dL


 


AST  45 H    (14-36)  U/L


 


Troponin I   0.091 H*   (0.000-0.034)  ng/mL


 


Total Protein  6.0 L    (6.3-8.2)  g/dL


 


Albumin  3.4 L    (3.5-5.0)  g/dL














  04/16/23 Range/Units





  07:00 


 


RBC   (3.80-5.40)  m/uL


 


Hgb   (11.4-16.0)  gm/dL


 


Neutrophils #   (1.3-7.7)  k/uL


 


Lymphocytes #   (1.0-4.8)  k/uL


 


ABG pH   (7.35-7.45)  


 


ABG pCO2   (35-45)  mmHg


 


ABG pO2   ()  mmHg


 


ABG HCO3   (21-25)  mmol/L


 


ABG O2 Saturation   (94-97)  %


 


Chloride   ()  mmol/L


 


Carbon Dioxide   (22-30)  mmol/L


 


BUN   (7-17)  mg/dL


 


Glucose   (74-99)  mg/dL


 


POC Glucose (mg/dL)  159 H  ()  mg/dL


 


Plasma Lactic Acid Kyaw   (0.7-2.0)  mmol/L


 


Calcium   (8.4-10.2)  mg/dL


 


AST   (14-36)  U/L


 


Troponin I   (0.000-0.034)  ng/mL


 


Total Protein   (6.3-8.2)  g/dL


 


Albumin   (3.5-5.0)  g/dL








                      Microbiology - Last 24 Hours (Table)











 04/15/23 11:19 Legionella Culture - Preliminary





 Sputum 


 


 04/15/23 11:19 Sputum Culture - Preliminary





 Sputum 














Assessment and Plan


Assessment: 








Acute cardiac arrest s/p CPR for about 15 minutes with return of spontaneous 

circulation.  Status post intubation by EMS.


Episodes of nonsustained V. tach.  Awaiting full AICD interrogation report


Possible anoxic brain injury


Chronic atrial fibrillation on anticoagulant Eliquis


History of ICD placement


Hyperglycemia


COPD on home oxygen


Chronic hypoxic respiratory failure on 4 L oxygen via nasal cannula


Recent history of femoral neck fracture repair in January 2023


History of IVC filter placement


Anxiety/depression





Plan:


Patient is currently in the MICU.  Status post CPR and currently on mechanical 

ventilator.  Downtime was was about 15 minutes.  Possible anoxic brain injury 

also is being considered.


Continue with antibiotics for pneumonia with Levaquin.  Continue insulin sliding

scale.Patient is off pressor support.


Critical care team, pulmonary and neurology is on board.  Prognosis poor at this

time.


EEG was done today.


Continue to follow closely.





Time with Patient: Greater than 30

## 2023-04-17 NOTE — P.PN
Subjective


Progress Note Date: 04/17/23





Patient initially seen by Dr. Allen Brannon.  Please refer to his note for details.





Patient is a 67-year-old female, who came to the hospital with cardiopulmonary 

arrest on 04/15/2023 at 9:37 AM.  The downtime was about 15 minutes.  Patient's 

sister and another relative was present.  Patient's nurse was also present.





Patient continues to be comatose.  She had sedation holiday performed for about 

half an hour at 9:50 AM in which she was not showing any meaningful response.  

At present patient on propofol 15 mcg/kg/m. patient's nurse mentioned that 

patient became tachycardic and blood pressure went up to 180 systolic, therefore

she was placed back on propofol.  No seizure-like activity noted.  Patient 

received Ativan 2 mg last night.





Objective





- Vital Signs


Vital signs: 


                                   Vital Signs











Temp  101.2 F H  04/17/23 15:30


 


Pulse  81   04/17/23 15:30


 


Resp  20   04/17/23 15:30


 


BP  133/78   04/16/23 21:00


 


Pulse Ox  95   04/17/23 15:30


 


FiO2  60   04/17/23 15:30








                                 Intake & Output











 04/16/23 04/17/23 04/17/23





 18:59 06:59 18:59


 


Intake Total 1819.857 9755.762 1045.255


 


Output Total 1505 595 320


 


Balance 213.021 797.762 725.255


 


Weight  84 kg 84 kg


 


Intake:   


 


  IV 1275 1100 600


 


    Magnesium Sulfate-D5w Pmx  100 





    1 gm In Dextrose/Water 1   





    100ml.bag @ 100 mls/hr   





    IVPB ONCE ONE Rx#:   





    [id number]   


 


    Sodium Chloride 0.9% 1, 675 900 600





    000 ml @ 75 mls/hr IV .   





    [id number] STA Rx#:[id number]   


 


    Sodium Chloride 0.9% 500 500  





    ml 500 ml @ 999 mls/hr IV   





    .Q31M ONE Rx#:[id number]   


 


    levETIRAcetam IV 1,500 mg 100 100 





    In Saline 1 100ml.bag @   





    400 mls/hr IVPB ONCE STA   





    Rx#:[id number]   


 


  Intake, IV Titration 353.021 47.762 245.255





  Amount   


 


    Magnesium Sulfate-D5w Pmx 100  





    1 gm In Dextrose/Water 1   





    100ml.bag @ 100 mls/hr   





    IVPB ONCE ONE Rx#:   





    [id number]   


 


    Norepinephrine 32 mg In 19.505  





    Sodium Chloride 0.9% 218   





    ml @ 0.03 MCG/KG/MIN 1.   





    148 mls/hr IV .Q24H [facility]   





    Rx#:[id number]   


 


    Piperacillin-Tazobactam 3 100  100





    .375 gm In Sodium   





    Chloride 0.9% 100 ml @ 25   





    mls/hr IVPB Q8H MIKHAIL Rx#:   





    [id number]   


 


    Sodium Chloride 0.9% 1,   75





    000 ml @ 75 mls/hr IV .   





    [id number] STA Rx#:[id number]   


 


    propofoL 1,000 mg In 133.516 47.762 70.255





    Empty Bag 1 bag @ 15 MCG/   





    KG/MIN 7.348 mls/hr IV .   





    [id number] [facility] Rx#:[id number]   


 


  Tube Feeding 60 120 140


 


  Other 30 125 60


 


Output:   


 


  Urine 1505 595 320


 


Other:   


 


  Voiding Method Indwelling Catheter Indwelling Catheter Indwelling Catheter








                       ABP, PAP, CO, CI - Last Documented











Arterial Blood Pressure        109/50

















- Exam





Patient is comatose, with GCS of 3.  Patient not responding to calling her name 

or painful stimuli.





Patient is slightly breathing over the ventilator.  She has a weak gag.  Her 

pupils are equal, round and reacting.  Gaze is very abnormal, deviated upwards 

and to the left side.  Oculocephalics are minimally present.  Corneals present.





On manually opening the eyes, patient had some eye twitching noted.  However no 

twitching noted at rest otherwise.





- Labs


CBC & Chem 7: 


                                 04/17/23 04:15





                                 04/17/23 17:15


Labs: 


                  Abnormal Lab Results - Last 24 Hours (Table)











  04/16/23 04/16/23 04/16/23 Range/Units





  18:17 22:50 23:16 


 


RBC     (3.80-5.40)  m/uL


 


Hgb     (11.4-16.0)  gm/dL


 


Hct     (34.0-46.0)  %


 


Lymphocytes #     (1.0-4.8)  k/uL


 


ABG pH     (7.35-7.45)  


 


Chloride   113 H   ()  mmol/L


 


Carbon Dioxide   20 L   (22-30)  mmol/L


 


BUN   18 H   (7-17)  mg/dL


 


Glucose   149 H   (74-99)  mg/dL


 


POC Glucose (mg/dL)  130 H   147 H  ()  mg/dL


 


Calcium   7.1 L   (8.4-10.2)  mg/dL


 


Procalcitonin     (0.02-0.09)  ng/mL














  04/17/23 04/17/23 04/17/23 Range/Units





  04:15 04:15 04:15 


 


RBC   3.01 L   (3.80-5.40)  m/uL


 


Hgb   9.5 L   (11.4-16.0)  gm/dL


 


Hct   29.3 L   (34.0-46.0)  %


 


Lymphocytes #   0.5 L   (1.0-4.8)  k/uL


 


ABG pH     (7.35-7.45)  


 


Chloride  113 H    ()  mmol/L


 


Carbon Dioxide     (22-30)  mmol/L


 


BUN  18 H    (7-17)  mg/dL


 


Glucose  156 H    (74-99)  mg/dL


 


POC Glucose (mg/dL)     ()  mg/dL


 


Calcium  7.2 L    (8.4-10.2)  mg/dL


 


Procalcitonin    0.49 H  (0.02-0.09)  ng/mL














  04/17/23 04/17/23 04/17/23 Range/Units





  04:47 05:06 11:27 


 


RBC     (3.80-5.40)  m/uL


 


Hgb     (11.4-16.0)  gm/dL


 


Hct     (34.0-46.0)  %


 


Lymphocytes #     (1.0-4.8)  k/uL


 


ABG pH  7.34 L    (7.35-7.45)  


 


Chloride     ()  mmol/L


 


Carbon Dioxide     (22-30)  mmol/L


 


BUN     (7-17)  mg/dL


 


Glucose     (74-99)  mg/dL


 


POC Glucose (mg/dL)   159 H  152 H  ()  mg/dL


 


Calcium     (8.4-10.2)  mg/dL


 


Procalcitonin     (0.02-0.09)  ng/mL








                      Microbiology - Last 24 Hours (Table)











 04/15/23 13:34 Blood Culture - Preliminary





 Blood    No Growth after 48 hours


 


 04/15/23 13:34 Blood Culture - Preliminary





 Blood    No Growth after 48 hours


 


 04/15/23 11:19 Gram Stain - Preliminary





 Sputum Sputum Culture - Preliminary














Assessment and Plan


Assessment: 





Cardiopulmonary arrest lasting for 15 minutes.  It appears the patient had 

episode of nonsustained V. tach is seems to happen 1 hour prior to the cardiac 

arrest but per cardiology unsure if true VT or A.fib


Anoxic encephalopathy.  Patient's computed tomography scan showing mild diffuse 

cerebral edema.  Some effacement of the lateral ventricles.  Patient has 

preserved brainstem reflexes at this time.  


Reported twitching of body that is intermittent on 04/15/23:  Probable seizures 

due to cardiopulmonary arrest- now resolved


Cardiomyopathy with ejection fraction of 30-35%


Status post intubation and mechanical ventilation due to cardiopulmonary last


Non-STEMI


Paroxysmal atrial fibrillation


History of stroke


AICD


Hypertension


CAD with prior PCI


History of green filter placement


Plan: 





EEG performed 04/16/2023 was abnormal routine EEG due to diffuse suppression, 

which can be due to either medication-induced or due to anoxia.  The background 

slowing is suggestive of severe encephalopathy.  Otherwise no focal slowing, or 

epileptiform discharges were seen.


Continue Keppra 1500 mg IV every 12 hours (new during this admission).





CT head performed early this morning at 5 AM showed no acute intracranial 

hemorrhage or midline shift.  There is mild diffuse cerebral atrophy 

redemonstrated.  Resolved left maxillary acute sinusitis.  Slightly worsening 

bilateral sphenoid acute sinusitis.  On my review, there is definite evidence of

at least mild generalized cerebral edema, with some effacement of the lateral 

ventricles.  





I informed results of EEG and computed tomography scan to the family.  Also 

reviewed CT films on the computer with the family.


It appears patient has fever and suspected aspiration pneumonia.  


Will defer management to primary and ICU team.


Patient is over 48 hours post cardiac arrest, with comatose state and severely 

abnormal examination as above.  We will reassess patient in another 24 hours.


Start hypertonic saline for cerebral edema noted on computed tomography scan.


Discussed with family in detail.

## 2023-04-17 NOTE — P.PN
Subjective


Progress Note Date: 04/17/23


Principal diagnosis: 





Cardiac arrest.





Pulmonary/critical care consult dated 04/15/2023.





67-year-old female who apparently had a cardiopulmonary arrest, at a local 

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  The patient apparently had a downtime of about 15 

minutes, for there was cardiopulmonary resuscitation and return of spontaneous 

circulation.  The patient was seen in the ER, by the ER physician, Dr. Pennington.  

The patient was vented, and a right femoral vein triple lumen catheter was 

placed.  Family members are in the room, we selected see the patient.  The 

patient herself is unresponsive.  She has a orally placed endotracheal tube.  

She's currently on the ventilator, with vent settings of volume assist control, 

rate 20, tidal volume 375, FiO2 100%, 5.  Blood gases show pO2 75, pCO2 of 53, 

and a pH is 7.17.  The patient is on norepinephrine at 0.15 mcg/kg/m, and 

propofol at 15 mcg/kg/m.  The patient has a history of COPD from secondhand 

tobacco exposure, a previous history of the fibrillator placement for 

cardiomyopathy, and history of stroke, and Sadaf filter placement.  In 

addition, the patient is on home O2, that she is supposed to use all the time, 

but she only uses as needed.  She was a smoker in the distant past.  White count

9.7, hemoglobin 11.4, hematocrit 37.9, and platelet count was normal.  D-dimer 

was 3.10.  Sodium 137, potassium 4.3, chlorides 104, CO2 17, anion gap 16, BUN 

and creatinine 12 and 0.76.  Troponin was 0.022 and N-terminal proBNP was 2290. 

Lactate was not measured but is probably elevated.  Testing for influenza A, and

B, RSV, and coronavirus are all negative.  Chest x-ray shows evidence of 

cardiomegaly, fluid overload, and an appropriately placed endotracheal tube.  

Computed tomography scan of the brain showed nothing acute.  CT angiogram was 

negative for PE.  There also may be some right lower lobe consolidation 

consistent with prior aspiration.





Progress note dated 04/16/2023.





67-year-old female who had an out-of-hospital cardiopulmonary arrest.  The 

patient had about 15 minutes of resuscitation before there was return of s

pontaneous circulation.  Unfortunately, the patient may have sustained anoxic 

brain injury.  Today, we place an art line.  She remains on the ventilator.  I 

did have neurology see her.  She is on volume assist control, rate 20, tidal 

volume 375, FiO2 60%, PEEP of 5.  Arterial blood gases show pO2 of 90, pCO2 34, 

and pH is 7.35.  The patient is on norepinephrine at 0.05 mcg/kg/m, propofol at 

40 mcg/kg/m, and saline at 75 mL an hour.  We will discontinue the Levaquin and 

Flagyl and start Zosyn.  In addition we'll start some tube feeds, at 10 mL an 

hour.  A right radial art line will be placed today.  White count 8.9, 

hemoglobin 10.8, hematocrit 34.1, and platelet count is normal.  Sodium 139, 

potassium 3.9, chlorides 112, CO2 18, BUN 19, creatinine 0.71.  Troponins were 

0.086 and 0.091.  Chest x-ray shows persistent bilateral infiltrates, more so in

the right lung than on the left.  This may relate to aspiration.





Progress note dated 04/17/2023.





67-year-old female who was in an out-of-hospital cardiopulmonary arrest.  The 

patient was resuscitated, and brought to the emergency department.  The patient 

had about a 15 minute period of resuscitation before there was return of 

spontaneous circulation.  She remains on mechanical ventilator.  She is on 

volume assist control, rate 20, tidal volume 375, FiO2 60% and PEEP of 5.  Blood

gases show pO2 of 89, pCO2 42, and a pH is 7.34.  She remains on propofol at 15 

mcg/kg/m, saline at 75 mL an hour, and vital high protein at 10 mL an hour.  

We'll check a pro-calcitonin level on her.  She remains on Zosyn empirically.  

White count 6.4, hemoglobin 9.5, hematocrit 29.3, with a normal platelet count. 

Sodium 138, potassium 4, chlorides 113, CO2 22, BUN 18, creatinine 0.73.  Blood 

and sputum sampling thus far is negative.  Chest x-ray is currently pending.





Objective





- Vital Signs


Vital signs: 


                                   Vital Signs











Temp  99.8 F H  04/17/23 05:00


 


Pulse  113 H  04/17/23 05:00


 


Resp  20   04/17/23 05:00


 


BP  133/78   04/16/23 21:00


 


Pulse Ox  94 L  04/17/23 05:00


 


FiO2  60   04/17/23 05:00








                                 Intake & Output











 04/16/23 04/16/23 04/17/23





 06:59 18:59 06:59


 


Intake Total 6275.235 5726.021 1307.762


 


Output Total 505 1505 550


 


Balance 638.334 213.021 757.762


 


Weight   84 kg


 


Intake:   


 


   1275 1025


 


    Magnesium Sulfate-D5w Pmx   100





    1 gm In Dextrose/Water 1   





    100ml.bag @ 100 mls/hr   





    IVPB ONCE ONE Rx#:   





    [id number]   


 


    Sodium Chloride 0.9% 1, 825 675 825





    000 ml @ 75 mls/hr IV .   





    [id number] STA Rx#:[id number]   


 


    Sodium Chloride 0.9% 500  500 





    ml 500 ml @ 999 mls/hr IV   





    .Q31M ONE Rx#:[id number]   


 


    levETIRAcetam IV 1,500 mg 100 100 100





    In Saline 1 100ml.bag @   





    400 mls/hr IVPB ONCE [facility]   





    Rx#:[id number]   


 


  Intake, IV Titration 218.334 353.021 47.762





  Amount   


 


    Magnesium Sulfate-D5w Pmx  100 





    1 gm In Dextrose/Water 1   





    100ml.bag @ 100 mls/hr   





    IVPB ONCE ONE Rx#:   





    [id number]   


 


    Norepinephrine 32 mg In 18.334 19.505 





    Sodium Chloride 0.9% 218   





    ml @ 0.03 MCG/KG/MIN 1.   





    148 mls/hr IV .Q24H [facility]   





    Rx#:[id number]   


 


    Piperacillin-Tazobactam 3  100 





    .375 gm In Sodium   





    Chloride 0.9% 100 ml @ 25   





    mls/hr IVPB Q8H [facility] Rx#:   





    [id number]   


 


    propofoL 1,000 mg In 200.000 133.516 47.762





    Empty Bag 1 bag @ 15 MCG/   





    KG/MIN 7.348 mls/hr IV .   





    [id number] [facility] Rx#:[id number]   


 


  Tube Feeding  60 110


 


  Other  30 125


 


Output:   


 


  Urine 505 1505 550


 


Other:   


 


  Voiding Method Indwelling Catheter Indwelling Catheter Indwelling Catheter








                       ABP, PAP, CO, CI - Last Documented











Arterial Blood Pressure        139/63

















- Exam





No acute distress, sedated on propofol, with an orally placed endotracheal tube.





HEENT examination is grossly unremarkable.  





Neck supple.  Full range of motion.  No adenopathy thyromegaly or neck vein 

distention.





Cardiovascular examination reveals regular rhythm rate.  S1-S2 normal.  No S3 or

S4.  No discernible murmur noted.  Heart rate 103 bpm.





Lungs reveal scattered rhonchi.  No wheezes or crackles.  Breath sounds equal.  

Saturation is 94%





Abdomen obese, without bowel sounds.





Extremities are intact.  No cyanosis clubbing or edema.





Skin is without rash or lesion.





Neurologic examination cannot be adequately assessed at this time.





- Labs


CBC & Chem 7: 


                                 04/17/23 04:15





                                 04/17/23 04:15


Labs: 


                  Abnormal Lab Results - Last 24 Hours (Table)











  04/15/23 04/16/23 04/16/23 Range/Units





  22:36 05:29 05:29 


 


RBC   3.44 L   (3.80-5.40)  m/uL


 


Hgb   10.8 L   (11.4-16.0)  gm/dL


 


Hct     (34.0-46.0)  %


 


Neutrophils #   7.8 H   (1.3-7.7)  k/uL


 


Lymphocytes #   0.8 L   (1.0-4.8)  k/uL


 


ABG pH     (7.35-7.45)  


 


ABG pCO2     (35-45)  mmHg


 


ABG HCO3     (21-25)  mmol/L


 


Chloride    112 H  ()  mmol/L


 


Carbon Dioxide    18 L  (22-30)  mmol/L


 


BUN    19 H  (7-17)  mg/dL


 


Glucose    146 H  (74-99)  mg/dL


 


POC Glucose (mg/dL)     ()  mg/dL


 


Calcium    7.7 L  (8.4-10.2)  mg/dL


 


AST    45 H  (14-36)  U/L


 


Troponin I  0.086 H*    (0.000-0.034)  ng/mL


 


Total Protein    6.0 L  (6.3-8.2)  g/dL


 


Albumin    3.4 L  (3.5-5.0)  g/dL


 


Urine Protein     (Negative)  


 


Urine Mucus     (None)  /hpf














  04/16/23 04/16/23 04/16/23 Range/Units





  05:29 06:25 07:00 


 


RBC     (3.80-5.40)  m/uL


 


Hgb     (11.4-16.0)  gm/dL


 


Hct     (34.0-46.0)  %


 


Neutrophils #     (1.3-7.7)  k/uL


 


Lymphocytes #     (1.0-4.8)  k/uL


 


ABG pH     (7.35-7.45)  


 


ABG pCO2   34 L   (35-45)  mmHg


 


ABG HCO3   20 L   (21-25)  mmol/L


 


Chloride     ()  mmol/L


 


Carbon Dioxide     (22-30)  mmol/L


 


BUN     (7-17)  mg/dL


 


Glucose     (74-99)  mg/dL


 


POC Glucose (mg/dL)    159 H  ()  mg/dL


 


Calcium     (8.4-10.2)  mg/dL


 


AST     (14-36)  U/L


 


Troponin I  0.091 H*    (0.000-0.034)  ng/mL


 


Total Protein     (6.3-8.2)  g/dL


 


Albumin     (3.5-5.0)  g/dL


 


Urine Protein     (Negative)  


 


Urine Mucus     (None)  /hpf














  04/16/23 04/16/23 04/16/23 Range/Units





  11:09 11:27 18:17 


 


RBC     (3.80-5.40)  m/uL


 


Hgb     (11.4-16.0)  gm/dL


 


Hct     (34.0-46.0)  %


 


Neutrophils #     (1.3-7.7)  k/uL


 


Lymphocytes #     (1.0-4.8)  k/uL


 


ABG pH     (7.35-7.45)  


 


ABG pCO2     (35-45)  mmHg


 


ABG HCO3     (21-25)  mmol/L


 


Chloride     ()  mmol/L


 


Carbon Dioxide     (22-30)  mmol/L


 


BUN     (7-17)  mg/dL


 


Glucose     (74-99)  mg/dL


 


POC Glucose (mg/dL)   132 H  130 H  ()  mg/dL


 


Calcium     (8.4-10.2)  mg/dL


 


AST     (14-36)  U/L


 


Troponin I     (0.000-0.034)  ng/mL


 


Total Protein     (6.3-8.2)  g/dL


 


Albumin     (3.5-5.0)  g/dL


 


Urine Protein  1+ H    (Negative)  


 


Urine Mucus  Rare H    (None)  /hpf














  04/16/23 04/16/23 04/17/23 Range/Units





  22:50 23:16 04:15 


 


RBC     (3.80-5.40)  m/uL


 


Hgb     (11.4-16.0)  gm/dL


 


Hct     (34.0-46.0)  %


 


Neutrophils #     (1.3-7.7)  k/uL


 


Lymphocytes #     (1.0-4.8)  k/uL


 


ABG pH     (7.35-7.45)  


 


ABG pCO2     (35-45)  mmHg


 


ABG HCO3     (21-25)  mmol/L


 


Chloride  113 H   113 H  ()  mmol/L


 


Carbon Dioxide  20 L    (22-30)  mmol/L


 


BUN  18 H   18 H  (7-17)  mg/dL


 


Glucose  149 H   156 H  (74-99)  mg/dL


 


POC Glucose (mg/dL)   147 H   ()  mg/dL


 


Calcium  7.1 L   7.2 L  (8.4-10.2)  mg/dL


 


AST     (14-36)  U/L


 


Troponin I     (0.000-0.034)  ng/mL


 


Total Protein     (6.3-8.2)  g/dL


 


Albumin     (3.5-5.0)  g/dL


 


Urine Protein     (Negative)  


 


Urine Mucus     (None)  /hpf














  04/17/23 04/17/23 04/17/23 Range/Units





  04:15 04:47 05:06 


 


RBC  3.01 L    (3.80-5.40)  m/uL


 


Hgb  9.5 L    (11.4-16.0)  gm/dL


 


Hct  29.3 L    (34.0-46.0)  %


 


Neutrophils #     (1.3-7.7)  k/uL


 


Lymphocytes #  0.5 L    (1.0-4.8)  k/uL


 


ABG pH   7.34 L   (7.35-7.45)  


 


ABG pCO2     (35-45)  mmHg


 


ABG HCO3     (21-25)  mmol/L


 


Chloride     ()  mmol/L


 


Carbon Dioxide     (22-30)  mmol/L


 


BUN     (7-17)  mg/dL


 


Glucose     (74-99)  mg/dL


 


POC Glucose (mg/dL)    159 H  ()  mg/dL


 


Calcium     (8.4-10.2)  mg/dL


 


AST     (14-36)  U/L


 


Troponin I     (0.000-0.034)  ng/mL


 


Total Protein     (6.3-8.2)  g/dL


 


Albumin     (3.5-5.0)  g/dL


 


Urine Protein     (Negative)  


 


Urine Mucus     (None)  /hpf








                      Microbiology - Last 24 Hours (Table)











 04/15/23 11:19 Gram Stain - Preliminary





 Sputum Sputum Culture - Preliminary


 


 04/15/23 13:34 Blood Culture - Preliminary





 Blood    No Growth after 24 hours


 


 04/15/23 13:34 Blood Culture - Preliminary





 Blood    No Growth after 24 hours


 


 04/15/23 11:19 Legionella Culture - Preliminary





 Sputum 














Assessment and Plan


Assessment: 





Status post out-of-hospital cardiopulmonary arrest, with at least 15 minutes of 

downtime, before cardiopulmonary resuscitation and return of spontaneous 

circulation, were accomplished.





Status post intubation and mechanical ventilation for cardiopulmonary arrest, 

04/15/2023.





Rule out anoxic brain injury.





History of atrial fibrillation.





Status post AICD placement.





History of COPD.





History of hypertension.





History of CVA.





Prior history of Sadaf filter placement.





History of anxiety/depression.








Plan: 





Plan dated 04/15/2023.





Labs, x-rays, medications are reviewed.  The patient is seen in the emergency 

room, trauma 2.  We will await the patient to arrive in the intensive care unit.

 I did speak to the family about the fact that there may be some concerns of 

anoxic brain injury, given the 15 minute downtime at [facility].  Anyway, the 

[place] the next 24 hours will be critical.  Labs, x-rays, and medications are 

reviewed.  We will adjust the ventilator, and make additional recommendations 

along the way.  Prognosis is very guarded at this point.





Plan dated 04/16/2023.





A right radial arterial line was placed today.  The patient's labs, x-rays, and 

medications are reviewed.  We'll start the patient on tube feedings.  Levaquin 

and Flagyl be discontinued in favor of Zosyn for possible aspiration pneumonia. 

The patient remains on propofol for sedation.  She is also on norepinephrine at 

0.05 mcg/kg/m for blood pressure support.  Additional recommendations and 

suggestions are forthcoming.  Neurology is to see the patient.  An EEG was 

ordered.  Prognosis is certainly guarded.





Plan dated 04/17/2023.





The patient's EEG showed diffuse significant slowing, consistent with 

metabolic/toxic encephalopathy.  The patient may have sustained significant 

anoxic brain of that, with out-of-hospital cardiac arrest.  The patient remains 

on Zosyn.  Cultures are negative.  We'll check a pro-calcitonin level.  She 

remains on propofol at 15 mcg/kg/m.  Labs, x-rays, and medications are reviewed.

 Overall prognosis remains guarded.  The patient is scheduled for another c

omputed tomography scan of the brain.  We will continue to follow this patient 

and make recommendations, where appropriate.


Time with Patient: Greater than 30

## 2023-04-18 LAB
ALBUMIN SERPL-MCNC: 3 G/DL (ref 3.5–5)
ALP SERPL-CCNC: 62 U/L (ref 38–126)
ALT SERPL-CCNC: 21 U/L (ref 4–34)
ANION GAP SERPL CALC-SCNC: 2 MMOL/L
ANION GAP SERPL CALC-SCNC: 6 MMOL/L
AST SERPL-CCNC: 81 U/L (ref 14–36)
BASOPHILS # BLD AUTO: 0 K/UL (ref 0–0.2)
BASOPHILS NFR BLD AUTO: 0 %
BUN SERPL-SCNC: 16 MG/DL (ref 7–17)
BUN SERPL-SCNC: 17 MG/DL (ref 7–17)
CALCIUM SPEC-MCNC: 7.3 MG/DL (ref 8.4–10.2)
CALCIUM SPEC-MCNC: 7.3 MG/DL (ref 8.4–10.2)
CHLORIDE SERPL-SCNC: 114 MMOL/L (ref 98–107)
CHLORIDE SERPL-SCNC: 116 MMOL/L (ref 98–107)
CO2 BLDA-SCNC: 23 MMOL/L (ref 19–24)
CO2 SERPL-SCNC: 22 MMOL/L (ref 22–30)
CO2 SERPL-SCNC: 23 MMOL/L (ref 22–30)
EOSINOPHIL # BLD AUTO: 0 K/UL (ref 0–0.7)
EOSINOPHIL NFR BLD AUTO: 0 %
ERYTHROCYTE [DISTWIDTH] IN BLOOD BY AUTOMATED COUNT: 2.93 M/UL (ref 3.8–5.4)
ERYTHROCYTE [DISTWIDTH] IN BLOOD: 14.5 % (ref 11.5–15.5)
GLUCOSE BLD-MCNC: 139 MG/DL (ref 70–110)
GLUCOSE BLD-MCNC: 141 MG/DL (ref 70–110)
GLUCOSE BLD-MCNC: 146 MG/DL (ref 70–110)
GLUCOSE BLD-MCNC: 165 MG/DL (ref 70–110)
GLUCOSE SERPL-MCNC: 149 MG/DL (ref 74–99)
GLUCOSE SERPL-MCNC: 149 MG/DL (ref 74–99)
HCO3 BLDA-SCNC: 22 MMOL/L (ref 21–25)
HCT VFR BLD AUTO: 28.9 % (ref 34–46)
HGB BLD-MCNC: 9.3 GM/DL (ref 11.4–16)
LYMPHOCYTES # SPEC AUTO: 0.7 K/UL (ref 1–4.8)
LYMPHOCYTES NFR SPEC AUTO: 10 %
MCH RBC QN AUTO: 31.6 PG (ref 25–35)
MCHC RBC AUTO-ENTMCNC: 32 G/DL (ref 31–37)
MCV RBC AUTO: 98.7 FL (ref 80–100)
MONOCYTES # BLD AUTO: 0.3 K/UL (ref 0–1)
MONOCYTES NFR BLD AUTO: 5 %
NEUTROPHILS # BLD AUTO: 6.1 K/UL (ref 1.3–7.7)
NEUTROPHILS NFR BLD AUTO: 85 %
PCO2 BLDA: 34 MMHG (ref 35–45)
PH BLDA: 7.42 [PH] (ref 7.35–7.45)
PLATELET # BLD AUTO: 163 K/UL (ref 150–450)
PO2 BLDA: 112 MMHG (ref 83–108)
POTASSIUM SERPL-SCNC: 4.4 MMOL/L (ref 3.5–5.1)
POTASSIUM SERPL-SCNC: 4.4 MMOL/L (ref 3.5–5.1)
PROT SERPL-MCNC: 5.7 G/DL (ref 6.3–8.2)
SODIUM SERPL-SCNC: 141 MMOL/L (ref 137–145)
SODIUM SERPL-SCNC: 142 MMOL/L (ref 137–145)
WBC # BLD AUTO: 7.2 K/UL (ref 3.8–10.6)

## 2023-04-18 RX ADMIN — INSULIN ASPART SCH: 100 INJECTION, SOLUTION INTRAVENOUS; SUBCUTANEOUS at 12:54

## 2023-04-18 RX ADMIN — INSULIN ASPART SCH: 100 INJECTION, SOLUTION INTRAVENOUS; SUBCUTANEOUS at 05:07

## 2023-04-18 RX ADMIN — SODIUM CHLORIDE SCH MLS/HR: 3 INJECTION, SOLUTION INTRAVENOUS at 14:06

## 2023-04-18 RX ADMIN — IPRATROPIUM BROMIDE AND ALBUTEROL SULFATE SCH ML: .5; 3 SOLUTION RESPIRATORY (INHALATION) at 08:30

## 2023-04-18 RX ADMIN — PANTOPRAZOLE SODIUM SCH MG: 40 INJECTION, POWDER, FOR SOLUTION INTRAVENOUS at 08:25

## 2023-04-18 RX ADMIN — PIPERACILLIN AND TAZOBACTAM SCH MLS/HR: 3; .375 INJECTION, POWDER, FOR SOLUTION INTRAVENOUS at 02:49

## 2023-04-18 RX ADMIN — INSULIN ASPART SCH UNIT: 100 INJECTION, SOLUTION INTRAVENOUS; SUBCUTANEOUS at 18:17

## 2023-04-18 RX ADMIN — CHLORHEXIDINE GLUCONATE SCH ML: 1.2 RINSE ORAL at 20:04

## 2023-04-18 RX ADMIN — LEVETIRACETAM SCH MLS/HR: 15 INJECTION INTRAVENOUS at 09:40

## 2023-04-18 RX ADMIN — IPRATROPIUM BROMIDE AND ALBUTEROL SULFATE SCH ML: .5; 3 SOLUTION RESPIRATORY (INHALATION) at 12:15

## 2023-04-18 RX ADMIN — NOREPINEPHRINE BITARTRATE SCH: 1 INJECTION, SOLUTION, CONCENTRATE INTRAVENOUS at 13:10

## 2023-04-18 RX ADMIN — CHLORHEXIDINE GLUCONATE SCH ML: 1.2 RINSE ORAL at 08:24

## 2023-04-18 RX ADMIN — LEVETIRACETAM SCH MLS/HR: 15 INJECTION INTRAVENOUS at 20:04

## 2023-04-18 RX ADMIN — IPRATROPIUM BROMIDE AND ALBUTEROL SULFATE SCH ML: .5; 3 SOLUTION RESPIRATORY (INHALATION) at 00:23

## 2023-04-18 RX ADMIN — IPRATROPIUM BROMIDE AND ALBUTEROL SULFATE SCH ML: .5; 3 SOLUTION RESPIRATORY (INHALATION) at 04:13

## 2023-04-18 RX ADMIN — APIXABAN SCH MG: 5 TABLET, FILM COATED ORAL at 08:25

## 2023-04-18 RX ADMIN — IPRATROPIUM BROMIDE AND ALBUTEROL SULFATE SCH ML: .5; 3 SOLUTION RESPIRATORY (INHALATION) at 17:00

## 2023-04-18 RX ADMIN — ATORVASTATIN CALCIUM SCH MG: 80 TABLET, FILM COATED ORAL at 08:24

## 2023-04-18 RX ADMIN — IPRATROPIUM BROMIDE AND ALBUTEROL SULFATE SCH ML: .5; 3 SOLUTION RESPIRATORY (INHALATION) at 20:00

## 2023-04-18 RX ADMIN — METOPROLOL TARTRATE SCH MG: 25 TABLET, FILM COATED ORAL at 08:25

## 2023-04-18 RX ADMIN — PIPERACILLIN AND TAZOBACTAM SCH MLS/HR: 3; .375 INJECTION, POWDER, FOR SOLUTION INTRAVENOUS at 10:21

## 2023-04-18 RX ADMIN — APIXABAN SCH MG: 5 TABLET, FILM COATED ORAL at 20:04

## 2023-04-18 RX ADMIN — PIPERACILLIN AND TAZOBACTAM SCH MLS/HR: 3; .375 INJECTION, POWDER, FOR SOLUTION INTRAVENOUS at 18:17

## 2023-04-18 RX ADMIN — METOPROLOL TARTRATE SCH MG: 25 TABLET, FILM COATED ORAL at 20:07

## 2023-04-18 RX ADMIN — ACETAMINOPHEN PRN MG: 325 TABLET, FILM COATED ORAL at 05:09

## 2023-04-18 NOTE — P.PN
Subjective


Progress Note Date: 04/18/23








67-year-old female who apparently had a cardiopulmonary arrest, at a local 

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  The patient apparently had a downtime of about 15 

minutes, for there was cardiopulmonary resuscitation and return of spontaneous 

circulation.  The patient was seen in the ER, by the ER physician, Dr. Pennington.  

The patient was vented, and a right femoral vein triple lumen catheter was 

placed.  Family members are in the room, we selected see the patient.  The 

patient herself is unresponsive.  She has a orally placed endotracheal tube.  

She's currently on the ventilator, with vent settings of volume assist control, 

rate 20, tidal volume 375, FiO2 100%, 5.  Blood gases show pO2 75, pCO2 of 53, 

and a pH is 7.17.  The patient is on norepinephrine at 0.15 mcg/kg/m, and 

propofol at 15 mcg/kg/m.  The patient has a history of COPD from secondhand 

tobacco exposure, a previous history of the fibrillator placement for 

cardiomyopathy, and history of stroke, and Sadaf filter placement.  In 

addition, the patient is on home O2, that she is supposed to use all the time, 

but she only uses as needed.  She was a smoker in the distant past.  White count

9.7, hemoglobin 11.4, hematocrit 37.9, and platelet count was normal.  D-dimer 

was 3.10.  Sodium 137, potassium 4.3, chlorides 104, CO2 17, anion gap 16, BUN 

and creatinine 12 and 0.76.  Troponin was 0.022 and N-terminal proBNP was 2290. 

Lactate was not measured but is probably elevated.  Testing for influenza A, and

B, RSV, and coronavirus are all negative.  Chest x-ray shows evidence of 

cardiomegaly, fluid overload, and an appropriately placed endotracheal tube.  

Computed tomography scan of the brain showed nothing acute.  CT angiogram was 

negative for PE.  There also may be some right lower lobe consolidation 

consistent with prior aspiration.





Progress note dated 04/16/2023.





67-year-old female who had an out-of-hospital cardiopulmonary arrest.  The 

patient had about 15 minutes of resuscitation before there was return of 

spontaneous circulation.  Unfortunately, the patient may have sustained anoxic 

brain injury.  Today, we place an art line.  She remains on the ventilator.  I 

did have neurology see her.  She is on volume assist control, rate 20, tidal 

volume 375, FiO2 60%, PEEP of 5.  Arterial blood gases show pO2 of 90, pCO2 34, 

and pH is 7.35.  The patient is on norepinephrine at 0.05 mcg/kg/m, propofol at 

40 mcg/kg/m, and saline at 75 mL an hour.  We will discontinue the Levaquin and 

Flagyl and start Zosyn.  In addition we'll start some tube feeds, at 10 mL an 

hour.  A right radial art line will be placed today.  White count 8.9, 

hemoglobin 10.8, hematocrit 34.1, and platelet count is normal.  Sodium 139, 

potassium 3.9, chlorides 112, CO2 18, BUN 19, creatinine 0.71.  Troponins were 

0.086 and 0.091.  Chest x-ray shows persistent bilateral infiltrates, more so in

the right lung than on the left.  This may relate to aspiration.








On today's evaluation of 04/18/2023, seeing the patient for a follow-up.  This 

is a case of a cardiac arrest with at least 15 minutes downtime.  The patient 

has an AICD in place.  Initial cardiac rhythm is not clear.  The did not have 

any pacemaker discharges and the pacemaker has not been interrogated yet.  Noted

the patient has a pacer AICD in place.  Her current cardiac rhythm is paced and 

her rate is currently at 76.  Hemodynamically, the patient is stable on no 

pressors.  The patient is maintaining her own blood pressure.  Meanwhile, the 

patient's troponins did not rise and the troponin peaked at 0.09.  The patient 

currently has signs of anoxic encephalopathy.  Attempts to wean her off the 

sedation yesterday feel that the patient became quite hypertensive and 

tachypneic and she did not show any reasonable neurological recovery.  Neurology

is on the case for the potential anoxic encephalopathy.  CAT scan of the brain 

was done on 04/15/2023 and 04/17/2023 and there is mild diffuse cerebral atrophy

without any acute brain changes.  There is bilateral sphenoid sinus disease.  

EEG was also done on 04/16/2023 indicating diffuse suppression due to anoxia.  

The patient is currently on propofol which is running at the rate of 15 

mcg/kg/m.  The patient is also on IV Keppra 1.5 g every 12 hours.  This morning,

the patient remains on a mechanical ventilator patient is on assist control mode

at the rate of 20, tidal volume of 375, FiO2 of 50% with a PEEP of 5.  The blood

gas shows a pH of 7.42 with a pCO2 of 34 and pO2 112.  Sodium is at 141 with a 

potassium level of 4.4, BUN is at 17 with a creatinine of 0.59.  WBC count at 

7.2 with a hemoglobin 9.3 and a platelet count of 163.  The chest x-ray from 

this morning is showing cardiomegaly along with bilateral pleural effusion.  ET 

tube is in a good location.  The patient also has a orogastric tube in place.  A

CT angiogram of the chest that was done on 04/15/2023 showed bilateral lower 

lobe and upper lobe consolidation and scattered areas of multifocal groundglass 

opacities throughout the lung.  Possibility of aspiration was considered in 

addition to underlying cardiomegaly and multiple acute minimally displaced 

anterior rib fractures in the setting of CPR.  IV fluids are currently at the 

rate of 25 mL an hour of 3% hypertonic saline.  This was initiated by neurology 

regarding the potential of CNS swelling.  As mentioned, the sodium level is at 

141 from this morning.  No seizure activity has been noted.Patient is currently 

on antibiotic without liquids 5 mg by mouth twice a day.  The patient is on met

oprolol 25 mg by mouth twice a day.  The patient is on Zestril 5 mg by mouth 

twice a day.  The patient is also on empiric antibiotic coverage with IV Zosyn. 

The echocardiogram showed an ejection fraction of 30-35% with global LV 

dysfunction.  There was mild aortic stenosis.





Objective





- Vital Signs


Vital signs: 


                                   Vital Signs











Temp  100.5 F H  04/18/23 07:00


 


Pulse  90   04/18/23 07:00


 


Resp  27 H  04/18/23 07:00


 


BP  133/78   04/16/23 21:00


 


Pulse Ox  94 L  04/18/23 07:00


 


FiO2  50   04/18/23 07:00








                                 Intake & Output











 04/17/23 04/18/23 04/18/23





 18:59 06:59 18:59


 


Intake Total 1225.000 887.027 65


 


Output Total 405 615 75


 


Balance 820.000 272.027 -10


 


Weight 84 kg 86 kg 


 


Intake:   


 


   275 25


 


    Sodium Chloride 0.9% 1, 675  





    000 ml @ 75 mls/hr IV .   





    [id number] [facility] Rx#:[id number]   


 


    Sodium Chloride 3%(  275 25





    Hypertonic) 500 ml @ 25   





    mls/hr IV .Q20H [facility] Rx#:   





    [id number]   


 


  Intake, IV Titration 300.000 72.027 





  Amount   


 


    Piperacillin-Tazobactam 3 100  





    .375 gm In Sodium   





    Chloride 0.9% 100 ml @ 25   





    mls/hr IVPB Q8H [facility] Rx#:   





    [id number]   


 


    Sodium Chloride 0.9% 1, 75  





    000 ml @ 75 mls/hr IV .   





    [id number][id number]   


 


    Sodium Chloride 3%( 25 25 





    Hypertonic) 500 ml @ 25   





    mls/hr IV .Q20H [facility] Rx#:   





    [id number]   


 


    propofoL 1,000 mg In 100.000 47.027 





    Empty Bag 1 bag @ 15 MCG/   





    KG/MIN 7.348 mls/hr IV .   





    [id number] [facility] Rx#:[id number]   


 


  Tube Feeding 190 430 40


 


  Other 60 110 


 


Output:   


 


  Urine 405 615 75


 


Other:   


 


  Voiding Method Indwelling Catheter Indwelling Catheter 








                       ABP, PAP, CO, CI - Last Documented











Arterial Blood Pressure        156/54

















- Exam








No acute distress, sedated on propofol, with an orally placed endotracheal tube.

 The patient is currently on propofol.  The patient is quite symptomatic since a

mechanical ventilator.





HEENT examination is grossly unremarkable.  





Neck supple.  Full range of motion.  No adenopathy thyromegaly or neck vein 

distention.





Cardiovascular examination reveals regular rhythm rate.  S1-S2 normal.  No S3 or

S4.  No discernible murmur noted.   





Lungs reveal scattered rhonchi.  No wheezes or crackles.  Breath sounds equal.  





Abdomen obese, without bowel sounds.





Extremities are intact.  No cyanosis clubbing or edema.





Skin is without rash or lesion.





Neurologic examination the patient is currently on propofol.  The patient senses

the pain probably more so that he flex.  No facial asymmetry.  Her gaze is up 4 

and more so to the left.  No nystagmus.  The patient is breathing above the 

mechanical ventilator.  The patient has a positive cough and a gag reflex.  The 

patient has a slow pupillary response.  The reflexes are equal and symmetrical 

and diminished bilaterally.  Negative clonus, negative Babinski's








- Labs


CBC & Chem 7: 


                                 04/18/23 04:10





                                 04/18/23 04:10


Labs: 


                  Abnormal Lab Results - Last 24 Hours (Table)











  04/17/23 04/17/23 04/17/23 Range/Units





  04:15 11:27 17:59 


 


RBC     (3.80-5.40)  m/uL


 


Hgb     (11.4-16.0)  gm/dL


 


Hct     (34.0-46.0)  %


 


Lymphocytes #     (1.0-4.8)  k/uL


 


ABG pCO2     (35-45)  mmHg


 


ABG pO2     ()  mmHg


 


ABG O2 Saturation     (94-97)  %


 


Chloride     ()  mmol/L


 


Glucose     (74-99)  mg/dL


 


POC Glucose (mg/dL)   152 H  145 H  ()  mg/dL


 


Calcium     (8.4-10.2)  mg/dL


 


AST     (14-36)  U/L


 


Total Protein     (6.3-8.2)  g/dL


 


Albumin     (3.5-5.0)  g/dL


 


Procalcitonin  0.49 H    (0.02-0.09)  ng/mL














  04/17/23 04/17/23 04/18/23 Range/Units





  18:25 23:37 04:10 


 


RBC     (3.80-5.40)  m/uL


 


Hgb     (11.4-16.0)  gm/dL


 


Hct     (34.0-46.0)  %


 


Lymphocytes #     (1.0-4.8)  k/uL


 


ABG pCO2     (35-45)  mmHg


 


ABG pO2     ()  mmHg


 


ABG O2 Saturation     (94-97)  %


 


Chloride    114 H  ()  mmol/L


 


Glucose    149 H  (74-99)  mg/dL


 


POC Glucose (mg/dL)  113 H  130 H   ()  mg/dL


 


Calcium    7.3 L  (8.4-10.2)  mg/dL


 


AST    81 H  (14-36)  U/L


 


Total Protein    5.7 L  (6.3-8.2)  g/dL


 


Albumin    3.0 L  (3.5-5.0)  g/dL


 


Procalcitonin     (0.02-0.09)  ng/mL














  04/18/23 04/18/23 04/18/23 Range/Units





  04:10 04:10 05:04 


 


RBC  2.93 L    (3.80-5.40)  m/uL


 


Hgb  9.3 L    (11.4-16.0)  gm/dL


 


Hct  28.9 L    (34.0-46.0)  %


 


Lymphocytes #  0.7 L    (1.0-4.8)  k/uL


 


ABG pCO2    34 L  (35-45)  mmHg


 


ABG pO2    112 H  ()  mmHg


 


ABG O2 Saturation    97.5 H  (94-97)  %


 


Chloride   116 H   ()  mmol/L


 


Glucose   149 H   (74-99)  mg/dL


 


POC Glucose (mg/dL)     ()  mg/dL


 


Calcium   7.3 L   (8.4-10.2)  mg/dL


 


AST     (14-36)  U/L


 


Total Protein     (6.3-8.2)  g/dL


 


Albumin     (3.5-5.0)  g/dL


 


Procalcitonin     (0.02-0.09)  ng/mL














  04/18/23 Range/Units





  05:06 


 


RBC   (3.80-5.40)  m/uL


 


Hgb   (11.4-16.0)  gm/dL


 


Hct   (34.0-46.0)  %


 


Lymphocytes #   (1.0-4.8)  k/uL


 


ABG pCO2   (35-45)  mmHg


 


ABG pO2   ()  mmHg


 


ABG O2 Saturation   (94-97)  %


 


Chloride   ()  mmol/L


 


Glucose   (74-99)  mg/dL


 


POC Glucose (mg/dL)  141 H  ()  mg/dL


 


Calcium   (8.4-10.2)  mg/dL


 


AST   (14-36)  U/L


 


Total Protein   (6.3-8.2)  g/dL


 


Albumin   (3.5-5.0)  g/dL


 


Procalcitonin   (0.02-0.09)  ng/mL








                      Microbiology - Last 24 Hours (Table)











 04/15/23 13:34 Blood Culture - Preliminary





 Blood    No Growth after 48 hours


 


 04/15/23 13:34 Blood Culture - Preliminary





 Blood    No Growth after 48 hours














Assessment and Plan


Plan: 








Status post out-of-hospital cardiopulmonary arrest, with at least 15 minutes of 

downtime, before cardiopulmonary resuscitation and return of spontaneous 

circulation, were accomplished.





Status post intubation and mechanical ventilation for cardiopulmonary arrest, 

04/15/2023.





anoxic brain injury, post cardiac arrest, currently unresponsive.  The patient 

does have brainstem reflexes.  Nevertheless, unresponsive once off sedation.





Acute fever post cardiac arrest, could be a central fever versus an infection.  

The patient is currently on IV Zosyn.  The temperature currently is at 102.4 and

cooling has been initiated.





Multifocal bilateral pulmonary infiltrates, consider aspiration and the patient 

is currently on IV Zosyn





Systolic heart failure with an ejection fraction of 30-35% and impaired LV.  

Noted the troponins were not elevated





Nondisplaced rib fractures related to CPR





Pacemaker insertion with a paced cardiac rhythm for now





History of atrial fibrillation.  The current cardiac rhythmand the patient is on

anticoagulation with Eliquis





Status post AICD placement.





History of COPD.





History of hypertension.





History of CVA.





Prior history of Sadaf filter placement.





History of anxiety/depression.





Enteral feeding for nutritional support with vital high-protein at the rate of 

35 mL an hour





Plan





Continue ventilator support, no ventilator changes than some drop in the FiO2 

down to 50% based on morning blood gases


Continue IV Zosyn


Immediate external cooling


IV Tylenol for fever and monitor the temperature


Keep the patient on sedation for now and will give a sedation holiday the later 

stage less his underlying mental status


Neurology follow-up regarding her anoxic encephalopathy


Continue Keppra


EEG and CAT scan of the brain were noted


Continue anticoagulation with Eliquis


Continue metoprolol 25 mg by mouth twice a day


The pacemaker needs to be evaluated and this will be coordinated with cardiology


Poor prognosis based on the above-mentioned comorbidities


We'll continue to follow.  Physical.  Evaluation was done in more than 30 

minutes.


Possible withdrawal of care and the family is considering that.  [facility] 

has been contacted





Time with Patient: Greater than 30

## 2023-04-18 NOTE — P.PN
Subjective


Progress Note Date: 04/18/23





PROGRESS NOTE


The patient is a 67-year-old female with a known history of CAD, cardiomyopathy,

ICD implantation, atrial fibrillation who presented with cardiac arrest, CPR and

downtime of about 15 minutes who is intubated, unresponsive.  Her echocardiogram

showed an ejection fraction of 30-35% with segmental wall motion abnormality.  

Her blood pressure has been stable but she is having episodes of ventricular 

ectopic activity and short burst of nonsustained ventricular tachycardia.  

According to the device interrogation no defibrillation or shock were done.  She

had an episode of possible nonsustained VT treated with ATP.  She is off 

vasopressors.  She is in sinus mechanism.  Computed tomography scan of the head 

showed no acute changes


April 18:


The patient remains intubated, unresponsive, was febrile.  Her blood pressure 

was elevated, under better control at this time.  She continues to be in sinus 

mechanism.  There is no evidence of malignant arrhythmia.  Her urinary output is

stable.  Her computed tomography scan of the chest showed no acute intracranial 

hemorrhage with mild diffuse cerebral atrophy.  She was evaluated by the 

neurology service, her EEG showed severe encephalopathy.





Medications:


 insulin, Keppra, Ativan, Zosyn,Eliquis 5 mg twice a day, lisinopril 5 mg daily,

metoprolol tartrate 25 mg twice a day, Lipitor 80 mg daily


PHYSICAL EXAMINATION:


Blood pressure 156/50 heart rate 90, temperature 100.5 was up to 103, intubated 

and unresponsive.  Pupils dilated and fixed


LUNGS: Clear to auscultation, anteriorly


HEART: Regular rate and rhythm, S1, S2. No S3.  systolic ejection murmur


ABDOMEN: Soft, positive bowel sounds, no organomegaly


EXTREMETIES: No edema





LAB:


Hemoglobin 9.3, potassium 4.4, BUN 17, creatinine 0.59, 


IMPRESSION:


1.  Status post cardiopulmonary arrest with down time of 15 minutes with 

evidence of anoxic encephalopathy, no evidence of improvement so far


2.  History of ischemic heart disease with severely impaired systolic function 

and ICD implantation


3.  History of prior PCI


4.  Paroxysmal atrial fibrillation


5.  History of hypertension, elevated


6.  History of hyperlipidemia


7.  Febrile episode could be related to central fever





PLAN:


1.  Increase lisinopril


2.  Continue beta blocker and anticoagulation


3.  Prognosis is poor in view of neurological status and probable severe anoxic 

encephalopathy, awaiting further input from neurology service.


 








Objective





- Vital Signs


Vital signs: 


                                   Vital Signs











Temp  100.5 F H  04/18/23 07:00


 


Pulse  90   04/18/23 07:00


 


Resp  27 H  04/18/23 07:00


 


BP  133/78   04/16/23 21:00


 


Pulse Ox  94 L  04/18/23 07:00


 


FiO2  50   04/18/23 07:00








                                 Intake & Output











 04/17/23 04/18/23 04/18/23





 18:59 06:59 18:59


 


Intake Total 1225.000 887.027 65


 


Output Total 405 615 75


 


Balance 820.000 272.027 -10


 


Weight 84 kg 86 kg 


 


Intake:   


 


   275 25


 


    Sodium Chloride 0.9% 1, 675  





    000 ml @ 75 mls/hr IV .   





    [id number] [facility] Rx#:[id number]   


 


    Sodium Chloride 3%(  275 25





    Hypertonic) 500 ml @ 25   





    mls/hr IV .Q20H [facility] Rx#:   





    [id number]   


 


  Intake, IV Titration 300.000 72.027 





  Amount   


 


    Piperacillin-Tazobactam 3 100  





    .375 gm In Sodium   





    Chloride 0.9% 100 ml @ 25   





    mls/hr IVPB Q8H [facility] Rx#:   





    [id number]   


 


    Sodium Chloride 0.9% 1, 75  





    000 ml @ 75 mls/hr IV .   





    [id number] [facility] Rx#:[id number]   


 


    Sodium Chloride 3%( 25 25 





    Hypertonic) 500 ml @ 25   





    mls/hr IV .Q20H [facility] Rx#:   





    [id number]   


 


    propofoL 1,000 mg In 100.000 47.027 





    Empty Bag 1 bag @ 15 MCG/   





    KG/MIN 7.348 mls/hr IV .   





    [id number] [facility] Rx#:[id number]   


 


  Tube Feeding 190 430 40


 


  Other 60 110 


 


Output:   


 


  Urine 405 615 75


 


Other:   


 


  Voiding Method Indwelling Catheter Indwelling Catheter 








                       ABP, PAP, CO, CI - Last Documented











Arterial Blood Pressure        156/54

















- Labs


CBC & Chem 7: 


                                 04/18/23 04:10





                                 04/18/23 04:10


Labs: 


                  Abnormal Lab Results - Last 24 Hours (Table)











  04/17/23 04/17/23 04/17/23 Range/Units





  04:15 11:27 17:59 


 


RBC     (3.80-5.40)  m/uL


 


Hgb     (11.4-16.0)  gm/dL


 


Hct     (34.0-46.0)  %


 


Lymphocytes #     (1.0-4.8)  k/uL


 


ABG pCO2     (35-45)  mmHg


 


ABG pO2     ()  mmHg


 


ABG O2 Saturation     (94-97)  %


 


Chloride     ()  mmol/L


 


Glucose     (74-99)  mg/dL


 


POC Glucose (mg/dL)   152 H  145 H  ()  mg/dL


 


Calcium     (8.4-10.2)  mg/dL


 


AST     (14-36)  U/L


 


Total Protein     (6.3-8.2)  g/dL


 


Albumin     (3.5-5.0)  g/dL


 


Procalcitonin  0.49 H    (0.02-0.09)  ng/mL














  04/17/23 04/17/23 04/18/23 Range/Units





  18:25 23:37 04:10 


 


RBC     (3.80-5.40)  m/uL


 


Hgb     (11.4-16.0)  gm/dL


 


Hct     (34.0-46.0)  %


 


Lymphocytes #     (1.0-4.8)  k/uL


 


ABG pCO2     (35-45)  mmHg


 


ABG pO2     ()  mmHg


 


ABG O2 Saturation     (94-97)  %


 


Chloride    114 H  ()  mmol/L


 


Glucose    149 H  (74-99)  mg/dL


 


POC Glucose (mg/dL)  113 H  130 H   ()  mg/dL


 


Calcium    7.3 L  (8.4-10.2)  mg/dL


 


AST    81 H  (14-36)  U/L


 


Total Protein    5.7 L  (6.3-8.2)  g/dL


 


Albumin    3.0 L  (3.5-5.0)  g/dL


 


Procalcitonin     (0.02-0.09)  ng/mL














  04/18/23 04/18/23 04/18/23 Range/Units





  04:10 04:10 05:04 


 


RBC  2.93 L    (3.80-5.40)  m/uL


 


Hgb  9.3 L    (11.4-16.0)  gm/dL


 


Hct  28.9 L    (34.0-46.0)  %


 


Lymphocytes #  0.7 L    (1.0-4.8)  k/uL


 


ABG pCO2    34 L  (35-45)  mmHg


 


ABG pO2    112 H  ()  mmHg


 


ABG O2 Saturation    97.5 H  (94-97)  %


 


Chloride   116 H   ()  mmol/L


 


Glucose   149 H   (74-99)  mg/dL


 


POC Glucose (mg/dL)     ()  mg/dL


 


Calcium   7.3 L   (8.4-10.2)  mg/dL


 


AST     (14-36)  U/L


 


Total Protein     (6.3-8.2)  g/dL


 


Albumin     (3.5-5.0)  g/dL


 


Procalcitonin     (0.02-0.09)  ng/mL














  04/18/23 Range/Units





  05:06 


 


RBC   (3.80-5.40)  m/uL


 


Hgb   (11.4-16.0)  gm/dL


 


Hct   (34.0-46.0)  %


 


Lymphocytes #   (1.0-4.8)  k/uL


 


ABG pCO2   (35-45)  mmHg


 


ABG pO2   ()  mmHg


 


ABG O2 Saturation   (94-97)  %


 


Chloride   ()  mmol/L


 


Glucose   (74-99)  mg/dL


 


POC Glucose (mg/dL)  141 H  ()  mg/dL


 


Calcium   (8.4-10.2)  mg/dL


 


AST   (14-36)  U/L


 


Total Protein   (6.3-8.2)  g/dL


 


Albumin   (3.5-5.0)  g/dL


 


Procalcitonin   (0.02-0.09)  ng/mL








                      Microbiology - Last 24 Hours (Table)











 04/15/23 13:34 Blood Culture - Preliminary





 Blood    No Growth after 48 hours


 


 04/15/23 13:34 Blood Culture - Preliminary





 Blood    No Growth after 48 hours

## 2023-04-18 NOTE — P.PN
Subjective


Progress Note Date: 04/17/23








Patient is a 67-year-old male with a known history of chronic atrial 

fibrillation on anticoagulation with Eliquis, cardiomyopathy status post ICD 

placement, COPD on home oxygen at 4 L via nasal cannula, hypertension, 

anxiety/depression and history of femoral neck fracture s/p repair in January 2023 was brought to the hospital by EMS status postcardiac arrest.  Patient was 

at [facility] where she was found to have worsening shortness of breath and became 

unresponsive on a motorized scooter..  Patient underwent CPR for about 15 

minutes by EMS and was given 3 doses of epinephrine with return of spontaneous 

circulation and was intubated.  Patient was brought to ER for evaluation.  

Patient was unresponsive and was able to provide any history.


On arrival chest x-ray showed cardiomegaly and mild pulmonary vascular 

congestion.  Correlate with BNP for congestive heart failure.  Right basilar 

patchy airspace opacities may represent pulmonary edema versus infiltrate.  

Endotracheal tube in appropriate position.  Possible NG tube terminating in the 

mid esophagus.


CT head showed no acute intracranial process.  Paranasal sinus disease with air-

fluid level within the left maxillary sinus.  Correlate for acute sinusitis.


Chest CTA showed no evidence of PE.  Bilateral lower lobe and right upper lobe 

consolidation with a scattered multifocal groundglass opacities throughout the 

lungs.  Findings are compatible with pneumonia possibly aspiration.


Multiple acute minimally displaced rib fractures likely related to CPR in the 

setting of cardiac arrest.  Remote to subacute bilateral rib fractures also demo

nstrated.  No pneumothorax.  Cardiomegaly.


Laboratory data showed WBC 9.7 hemoglobin 11.4 platelets 246 and D-dimer 3.1


Initial ABG showed pH of 7.17 PCO2 30 and PO2 75


Sodium 135, potassium 4.3, chloride 104, bicarb is 17 BUN 12 and creatinine 0.76

and blood sugar was 325 and lactic acid 2.9 AST 57 ALT 14 alk phos 73 and 

troponin x1 negative


proBNP 2290


Influenza A B RSV and COVID-19 PCR not detected.





4/16/2023


Patient is in the MICU.  Remains mechanical ventilator.  Off pressor support.


Otherwise patient remains unresponsive.  EEG was ordered and neurology is on 

board.


2D echocardiogram showed ejection fraction 30 to 35% with anterior septal 

hypokinesis.  AICD C interrogation was done.


Cardiology neurology and pulmonary is on board.


Chest x-ray showed persistent right midlung and bibasilar multifocal acute 

infiltrates without edema.  No change from 1 day.


Patient remains on antibiotics Zosyn.  NG tube in place.





4/17/2023





Patient is seen and evaluated and follow-up in the ICU remains on mechanical 

ventilation, FiO2 is 60% with a peep of 5.  Patient is off pressor support 

although continues on as needed Ativan along with IV Keppra and propofol.  

Patient also maintained on IV antibiotics in the form of Zosyn.  Chest x-ray 

today shows stable bilateral interstitial and airspace opacification with 

bilateral small pleural effusions may relate to pulmonary edema or infectious 

etiology.  Blood cultures thus far are negative and sputum culture currently 

pending.  Patient does continue to have low-grade temps 101 early this morning. 

WBC remains within normal limits and pro-calcitonin is 0.49.  Other kidney 

functions within normal limits and blood sugars being monitored.  Per nursing 

staff attempts at weaning were performed although patient became extremely 

tachycardic and restless and not following commands and placed back on sedation.

 An EEG was done although pending at this time.  Repeat CT of the brain early 

this morning shows no acute intracranial hemorrhage or midline shift with mild 

diffuse cerebral atrophy redemonstrated with slightly worsening bilateral spheno

id acute sinusitis.  Multiple medical consultations following an per nursing 

staff [facility] [employer] was notified although unsure of family is aware.  Patient was

on the registry.





Review of systems: Unable to obtain as patient is intubated








PHYSICAL EXAMINATION: 


Patient is on mechanical ventilator.  FiO2 is currently 60% with a PEEP of 5


HEENT: Normocephalic. Neck is supple. Pupils reactive. Nostrils clear. Oral 

cavity is moist. 


Neck reveals no JVD, carotid bruits, or thyromegaly. 


CHEST EXAMINATION: Trachea is central. Symmetrical expansion.  Bibasilar 

diminished sounds and coarse breath sounds.  No wheezing.


CARDIAC: Normal S1, S2 with no gallops. No murmurs 


ABDOMEN: Soft. Bowel sounds present.  Nontender.  No organomegaly. No abdominal 

bruits. 


Extremities: Trace lower extremity edema.  No clubbing or cyanosis


Neurologically patient is on mechanical ventilator.  As well as sedation and did

not tolerate sedation holiday well


Skin: No rash or skin lesions. 


Psychiatric: Could not be assessed at this time


Musculoskeletal: No joint swelling or deformity.





Assessment:





Acute cardiac arrest s/p CPR for about 15 minutes with return of spontaneous 

circulation.  Status post intubation by EMS.


Episodes of nonsustained V. tach.  Awaiting full AICD interrogation report


Possible anoxic brain injury with severe encephalopathy noted an EEG


Chronic atrial fibrillation on anticoagulant Eliquis


History of ICD placement


Hyperglycemia


COPD history 


Chronic hypoxic respiratory failure secondary to COPD on 4 L oxygen via nasal 

cannula


Recent history of femoral neck fracture repair in January 2023


History of IVC filter placement


Anxiety/depression history


Full code





Plan:





Patient is currently in the MICU and remains on mechanical ventilation with an 

FiO2 of 60% and PEEP is 5.  Status post CPR and currently on mechanical 

ventilator.  Downtime was was about 15 minutes.  Possible anoxic brain injury 

also is being considered.  EEG with severe encephalopathy with no epileptiform 

discharges noted maintained on IV Keppra with neurology following.  CT of the 

brain was done showing no acute hemorrhage or acute process with some edema and 

being started on hypertonic solution


Patient undergoing sedation holidays and per nursing staff became extremely 

tachycardic blood pressure elevated and unable to follow any commands and 

started back on propofol


Neurology recommend monitoring overnight and reevaluate in the a.m.


Continue with antibiotics for pneumonia with Levaquin.  Continue insulin sliding

scale.Patient is off pressor support.


Multiple medical consultations following


Given the downtime in severity cardiac arrest overall prognosis is poor.  

Patient is currently a full code








The impression and plan of care has been dictated by Myrtle Hyman, Nurse 

Practitioner as directed.





Dr. Chris MD


I have performed a history and examination and MDM of this patient, discussed 

the same with the dictator, and  agree with the dictator's assessment and plan 

as written ,documented as a scribe. Based on total visit time,  I have performed

more than 50% of the visit.  





Objective





- Vital Signs


Vital signs: 


                                   Vital Signs











Temp  99.8 F H  04/17/23 05:00


 


Pulse  105 H  04/17/23 09:00


 


Resp  20   04/17/23 09:00


 


BP  133/78   04/16/23 21:00


 


Pulse Ox  96   04/17/23 08:00


 


FiO2  60   04/17/23 09:00








                                 Intake & Output











 04/16/23 04/17/23 04/17/23





 18:59 06:59 18:59


 


Intake Total 9574.428 5400.762 285


 


Output Total 1505 595 110


 


Balance 213.021 797.762 175


 


Weight  84 kg 


 


Intake:   


 


  IV 1275 1100 225


 


    Magnesium Sulfate-D5w Pmx  100 





    1 gm In Dextrose/Water 1   





    100ml.bag @ 100 mls/hr   





    IVPB ONCE ONE Rx#:   





    [id number]   


 


    Sodium Chloride 0.9% 1, 675 900 225





    000 ml @ 75 mls/hr IV .   





    [id number] [facility] Rx#:[id number]   


 


    Sodium Chloride 0.9% 500 500  





    ml 500 ml @ 999 mls/hr IV   





    .Q31M ONE Rx#:[id number]   


 


    levETIRAcetam IV 1,500 mg 100 100 





    In Saline 1 100ml.bag @   





    400 mls/hr IVPB ONCE [facility]   





    Rx#:[id number]   


 


  Intake, IV Titration 353.021 47.762 





  Amount   


 


    Magnesium Sulfate-D5w Pmx 100  





    1 gm In Dextrose/Water 1   





    100ml.bag @ 100 mls/hr   





    IVPB ONCE ONE Rx#:   





    [id number]   


 


    Norepinephrine 32 mg In 19.505  





    Sodium Chloride 0.9% 218   





    ml @ 0.03 MCG/KG/MIN 1.   





    148 mls/hr IV .Q24H [facility]   





    Rx#:[id number]   


 


    Piperacillin-Tazobactam 3 100  





    .375 gm In Sodium   





    Chloride 0.9% 100 ml @ 25   





    mls/hr IVPB Q8H [facility] Rx#:   





    [id number]   


 


    propofoL 1,000 mg In 133.516 47.762 





    Empty Bag 1 bag @ 15 MCG/   





    KG/MIN 7.348 mls/hr IV .   





    [id number] [facility] Rx#:[id number]   


 


  Tube Feeding 60 120 30


 


  Other 30 125 30


 


Output:   


 


  Urine 1505 595 110


 


Other:   


 


  Voiding Method Indwelling Catheter Indwelling Catheter 








                       ABP, PAP, CO, CI - Last Documented











Arterial Blood Pressure        140/60

















- Labs


CBC & Chem 7: 


                                 04/17/23 04:15





                                 04/18/23 00:10


Labs: 


                  Abnormal Lab Results - Last 24 Hours (Table)











  04/16/23 04/16/23 04/16/23 Range/Units





  11:09 11:27 18:17 


 


RBC     (3.80-5.40)  m/uL


 


Hgb     (11.4-16.0)  gm/dL


 


Hct     (34.0-46.0)  %


 


Lymphocytes #     (1.0-4.8)  k/uL


 


ABG pH     (7.35-7.45)  


 


Chloride     ()  mmol/L


 


Carbon Dioxide     (22-30)  mmol/L


 


BUN     (7-17)  mg/dL


 


Glucose     (74-99)  mg/dL


 


POC Glucose (mg/dL)   132 H  130 H  ()  mg/dL


 


Calcium     (8.4-10.2)  mg/dL


 


Procalcitonin     (0.02-0.09)  ng/mL


 


Urine Protein  1+ H    (Negative)  


 


Urine Mucus  Rare H    (None)  /hpf














  04/16/23 04/16/23 04/17/23 Range/Units





  22:50 23:16 04:15 


 


RBC     (3.80-5.40)  m/uL


 


Hgb     (11.4-16.0)  gm/dL


 


Hct     (34.0-46.0)  %


 


Lymphocytes #     (1.0-4.8)  k/uL


 


ABG pH     (7.35-7.45)  


 


Chloride  113 H   113 H  ()  mmol/L


 


Carbon Dioxide  20 L    (22-30)  mmol/L


 


BUN  18 H   18 H  (7-17)  mg/dL


 


Glucose  149 H   156 H  (74-99)  mg/dL


 


POC Glucose (mg/dL)   147 H   ()  mg/dL


 


Calcium  7.1 L   7.2 L  (8.4-10.2)  mg/dL


 


Procalcitonin     (0.02-0.09)  ng/mL


 


Urine Protein     (Negative)  


 


Urine Mucus     (None)  /hpf














  04/17/23 04/17/23 04/17/23 Range/Units





  04:15 04:15 04:47 


 


RBC  3.01 L    (3.80-5.40)  m/uL


 


Hgb  9.5 L    (11.4-16.0)  gm/dL


 


Hct  29.3 L    (34.0-46.0)  %


 


Lymphocytes #  0.5 L    (1.0-4.8)  k/uL


 


ABG pH    7.34 L  (7.35-7.45)  


 


Chloride     ()  mmol/L


 


Carbon Dioxide     (22-30)  mmol/L


 


BUN     (7-17)  mg/dL


 


Glucose     (74-99)  mg/dL


 


POC Glucose (mg/dL)     ()  mg/dL


 


Calcium     (8.4-10.2)  mg/dL


 


Procalcitonin   0.49 H   (0.02-0.09)  ng/mL


 


Urine Protein     (Negative)  


 


Urine Mucus     (None)  /hpf














  04/17/23 Range/Units





  05:06 


 


RBC   (3.80-5.40)  m/uL


 


Hgb   (11.4-16.0)  gm/dL


 


Hct   (34.0-46.0)  %


 


Lymphocytes #   (1.0-4.8)  k/uL


 


ABG pH   (7.35-7.45)  


 


Chloride   ()  mmol/L


 


Carbon Dioxide   (22-30)  mmol/L


 


BUN   (7-17)  mg/dL


 


Glucose   (74-99)  mg/dL


 


POC Glucose (mg/dL)  159 H  ()  mg/dL


 


Calcium   (8.4-10.2)  mg/dL


 


Procalcitonin   (0.02-0.09)  ng/mL


 


Urine Protein   (Negative)  


 


Urine Mucus   (None)  /hpf








                      Microbiology - Last 24 Hours (Table)











 04/15/23 11:19 Gram Stain - Preliminary





 Sputum Sputum Culture - Preliminary


 


 04/15/23 13:34 Blood Culture - Preliminary





 Blood    No Growth after 24 hours


 


 04/15/23 13:34 Blood Culture - Preliminary





 Blood    No Growth after 24 hours


 


 04/15/23 11:19 Legionella Culture - Preliminary





 Sputum

## 2023-04-18 NOTE — XR
EXAMINATION TYPE: XR chest 1V portable

 

DATE OF EXAM: 4/18/2023

 

COMPARISON: 4/17/2023

 

HISTORY: Tube placement

 

TECHNIQUE: Single frontal view of the chest is obtained.

 

FINDINGS:  

 

There is an endotracheal tube terminating approximately 4 cm from the guzman, orogastric tube which e
xtends past the diaphragm to terminate outside the field of view, left-sided AICD, partially visualiz
ed left shoulder arthroplasty, and multiple overlying cardiac leads.

 

The heart size and cardiomediastinal silhouette are unchanged. There is redemonstration of small bila
teral pleural effusions with adjacent airspace disease, which is not significantly changed when matt
red to previous examination.  There is redemonstration of bilateral interstitial opacities, which are
 also not changed when compared to previous examination. There is no appreciable pneumothorax.

 

IMPRESSION:  Grossly stable bilateral airspace disease with small bilateral pleural effusions.

## 2023-04-19 LAB
ALBUMIN SERPL-MCNC: 2.8 G/DL (ref 3.5–5)
ALP SERPL-CCNC: 55 U/L (ref 38–126)
ALP SERPL-CCNC: 56 U/L (ref 38–126)
ALP SERPL-CCNC: 58 U/L (ref 38–126)
ALT SERPL-CCNC: 27 U/L (ref 4–34)
ALT SERPL-CCNC: 27 U/L (ref 4–34)
ALT SERPL-CCNC: 28 U/L (ref 4–34)
ANION GAP SERPL CALC-SCNC: 2 MMOL/L
ANION GAP SERPL CALC-SCNC: 3 MMOL/L
ANION GAP SERPL CALC-SCNC: 3 MMOL/L
ANION GAP SERPL CALC-SCNC: 5 MMOL/L
APTT BLD: 22 SEC (ref 22–30)
APTT BLD: 23.8 SEC (ref 22–30)
AST SERPL-CCNC: 85 U/L (ref 14–36)
AST SERPL-CCNC: 90 U/L (ref 14–36)
AST SERPL-CCNC: 90 U/L (ref 14–36)
BASOPHILS # BLD AUTO: 0 K/UL (ref 0–0.2)
BASOPHILS NFR BLD AUTO: 0 %
BUN SERPL-SCNC: 17 MG/DL (ref 7–17)
BUN SERPL-SCNC: 18 MG/DL (ref 7–17)
CALCIUM SPEC-MCNC: 7.5 MG/DL (ref 8.4–10.2)
CALCIUM SPEC-MCNC: 7.6 MG/DL (ref 8.4–10.2)
CALCIUM SPEC-MCNC: 7.7 MG/DL (ref 8.4–10.2)
CALCIUM SPEC-MCNC: 7.9 MG/DL (ref 8.4–10.2)
CHLORIDE SERPL-SCNC: 119 MMOL/L (ref 98–107)
CHLORIDE SERPL-SCNC: 120 MMOL/L (ref 98–107)
CHLORIDE SERPL-SCNC: 120 MMOL/L (ref 98–107)
CHLORIDE SERPL-SCNC: 121 MMOL/L (ref 98–107)
CO2 BLDA-SCNC: 23 MMOL/L (ref 19–24)
CO2 SERPL-SCNC: 22 MMOL/L (ref 22–30)
CO2 SERPL-SCNC: 22 MMOL/L (ref 22–30)
CO2 SERPL-SCNC: 24 MMOL/L (ref 22–30)
CO2 SERPL-SCNC: 25 MMOL/L (ref 22–30)
EOSINOPHIL # BLD AUTO: 0 K/UL (ref 0–0.7)
EOSINOPHIL NFR BLD AUTO: 1 %
ERYTHROCYTE [DISTWIDTH] IN BLOOD BY AUTOMATED COUNT: 2.71 M/UL (ref 3.8–5.4)
ERYTHROCYTE [DISTWIDTH] IN BLOOD BY AUTOMATED COUNT: 2.81 M/UL (ref 3.8–5.4)
ERYTHROCYTE [DISTWIDTH] IN BLOOD BY AUTOMATED COUNT: 2.81 M/UL (ref 3.8–5.4)
ERYTHROCYTE [DISTWIDTH] IN BLOOD: 14.4 % (ref 11.5–15.5)
ERYTHROCYTE [DISTWIDTH] IN BLOOD: 14.5 % (ref 11.5–15.5)
ERYTHROCYTE [DISTWIDTH] IN BLOOD: 14.5 % (ref 11.5–15.5)
FIBRINOGEN PPP-MCNC: 366 MG/DL (ref 200–500)
GLUCOSE BLD-MCNC: 142 MG/DL (ref 70–110)
GLUCOSE BLD-MCNC: 160 MG/DL (ref 70–110)
GLUCOSE BLD-MCNC: 165 MG/DL (ref 70–110)
GLUCOSE BLD-MCNC: 172 MG/DL (ref 70–110)
GLUCOSE SERPL-MCNC: 134 MG/DL (ref 74–99)
GLUCOSE SERPL-MCNC: 156 MG/DL (ref 74–99)
GLUCOSE SERPL-MCNC: 170 MG/DL (ref 74–99)
GLUCOSE SERPL-MCNC: 177 MG/DL (ref 74–99)
HCO3 BLDA-SCNC: 22 MMOL/L (ref 21–25)
HCT VFR BLD AUTO: 26.9 % (ref 34–46)
HCT VFR BLD AUTO: 27.8 % (ref 34–46)
HCT VFR BLD AUTO: 27.8 % (ref 34–46)
HGB BLD-MCNC: 8.6 GM/DL (ref 11.4–16)
HGB BLD-MCNC: 9 GM/DL (ref 11.4–16)
HGB BLD-MCNC: 9.1 GM/DL (ref 11.4–16)
INR PPP: 1 (ref ?–1.2)
INR PPP: 1 (ref ?–1.2)
LYMPHOCYTES # SPEC AUTO: 0.3 K/UL (ref 1–4.8)
LYMPHOCYTES # SPEC AUTO: 0.5 K/UL (ref 1–4.8)
LYMPHOCYTES # SPEC AUTO: 0.6 K/UL (ref 1–4.8)
LYMPHOCYTES NFR SPEC AUTO: 11 %
LYMPHOCYTES NFR SPEC AUTO: 4 %
LYMPHOCYTES NFR SPEC AUTO: 9 %
MAGNESIUM SPEC-SCNC: 2.3 MG/DL (ref 1.6–2.3)
MCH RBC QN AUTO: 31.7 PG (ref 25–35)
MCH RBC QN AUTO: 32 PG (ref 25–35)
MCH RBC QN AUTO: 32.3 PG (ref 25–35)
MCHC RBC AUTO-ENTMCNC: 31.9 G/DL (ref 31–37)
MCHC RBC AUTO-ENTMCNC: 32.3 G/DL (ref 31–37)
MCHC RBC AUTO-ENTMCNC: 32.7 G/DL (ref 31–37)
MCV RBC AUTO: 99 FL (ref 80–100)
MCV RBC AUTO: 99.1 FL (ref 80–100)
MCV RBC AUTO: 99.4 FL (ref 80–100)
MONOCYTES # BLD AUTO: 0.2 K/UL (ref 0–1)
MONOCYTES NFR BLD AUTO: 4 %
MONOCYTES NFR BLD AUTO: 4 %
MONOCYTES NFR BLD AUTO: 5 %
NEUTROPHILS # BLD AUTO: 4.2 K/UL (ref 1.3–7.7)
NEUTROPHILS # BLD AUTO: 5.1 K/UL (ref 1.3–7.7)
NEUTROPHILS # BLD AUTO: 5.2 K/UL (ref 1.3–7.7)
NEUTROPHILS NFR BLD AUTO: 83 %
NEUTROPHILS NFR BLD AUTO: 86 %
NEUTROPHILS NFR BLD AUTO: 90 %
PCO2 BLDA: 36 MMHG (ref 35–45)
PH BLDA: 7.39 [PH] (ref 7.35–7.45)
PH UR: 6 [PH] (ref 5–8)
PLATELET # BLD AUTO: 151 K/UL (ref 150–450)
PLATELET # BLD AUTO: 153 K/UL (ref 150–450)
PLATELET # BLD AUTO: 153 K/UL (ref 150–450)
PO2 BLDA: 87 MMHG (ref 83–108)
POTASSIUM SERPL-SCNC: 3.5 MMOL/L (ref 3.5–5.1)
POTASSIUM SERPL-SCNC: 3.8 MMOL/L (ref 3.5–5.1)
POTASSIUM SERPL-SCNC: 4.2 MMOL/L (ref 3.5–5.1)
POTASSIUM SERPL-SCNC: 4.2 MMOL/L (ref 3.5–5.1)
PROT SERPL-MCNC: 5.4 G/DL (ref 6.3–8.2)
PT BLD: 10.4 SEC (ref 9–12)
PT BLD: 10.6 SEC (ref 9–12)
RBC UR QL: 2 /HPF (ref 0–5)
SODIUM SERPL-SCNC: 144 MMOL/L (ref 137–145)
SODIUM SERPL-SCNC: 146 MMOL/L (ref 137–145)
SODIUM SERPL-SCNC: 148 MMOL/L (ref 137–145)
SODIUM SERPL-SCNC: 148 MMOL/L (ref 137–145)
SP GR UR: 1.02 (ref 1–1.03)
SQUAMOUS UR QL AUTO: <1 /HPF (ref 0–4)
UROBILINOGEN UR QL STRIP: <2 MG/DL (ref ?–2)
WBC # BLD AUTO: 5.1 K/UL (ref 3.8–10.6)
WBC # BLD AUTO: 5.8 K/UL (ref 3.8–10.6)
WBC # BLD AUTO: 5.9 K/UL (ref 3.8–10.6)
WBC #/AREA URNS HPF: 1 /HPF (ref 0–5)

## 2023-04-19 RX ADMIN — POTASSIUM BICARBONATE SCH MEQ: 782 TABLET, EFFERVESCENT ORAL at 09:38

## 2023-04-19 RX ADMIN — METOPROLOL TARTRATE SCH MG: 25 TABLET, FILM COATED ORAL at 09:37

## 2023-04-19 RX ADMIN — LEVETIRACETAM SCH MLS/HR: 15 INJECTION INTRAVENOUS at 09:48

## 2023-04-19 RX ADMIN — PIPERACILLIN AND TAZOBACTAM SCH MLS/HR: 3; .375 INJECTION, POWDER, FOR SOLUTION INTRAVENOUS at 17:21

## 2023-04-19 RX ADMIN — APIXABAN SCH MG: 5 TABLET, FILM COATED ORAL at 20:29

## 2023-04-19 RX ADMIN — ATORVASTATIN CALCIUM SCH MG: 80 TABLET, FILM COATED ORAL at 09:37

## 2023-04-19 RX ADMIN — INSULIN ASPART SCH UNIT: 100 INJECTION, SOLUTION INTRAVENOUS; SUBCUTANEOUS at 06:11

## 2023-04-19 RX ADMIN — MIDAZOLAM SCH: 5 INJECTION INTRAMUSCULAR; INTRAVENOUS at 00:03

## 2023-04-19 RX ADMIN — PHENYTOIN SODIUM SCH MLS/HR: 50 INJECTION INTRAMUSCULAR; INTRAVENOUS at 20:58

## 2023-04-19 RX ADMIN — IPRATROPIUM BROMIDE AND ALBUTEROL SULFATE SCH ML: .5; 3 SOLUTION RESPIRATORY (INHALATION) at 11:59

## 2023-04-19 RX ADMIN — IPRATROPIUM BROMIDE AND ALBUTEROL SULFATE SCH ML: .5; 3 SOLUTION RESPIRATORY (INHALATION) at 03:25

## 2023-04-19 RX ADMIN — PANTOPRAZOLE SODIUM SCH MG: 40 INJECTION, POWDER, FOR SOLUTION INTRAVENOUS at 09:37

## 2023-04-19 RX ADMIN — INSULIN ASPART SCH: 100 INJECTION, SOLUTION INTRAVENOUS; SUBCUTANEOUS at 11:34

## 2023-04-19 RX ADMIN — METOPROLOL TARTRATE SCH MG: 25 TABLET, FILM COATED ORAL at 20:29

## 2023-04-19 RX ADMIN — LEVETIRACETAM SCH MLS/HR: 15 INJECTION INTRAVENOUS at 20:29

## 2023-04-19 RX ADMIN — INSULIN ASPART SCH: 100 INJECTION, SOLUTION INTRAVENOUS; SUBCUTANEOUS at 00:04

## 2023-04-19 RX ADMIN — CHLORHEXIDINE GLUCONATE SCH ML: 1.2 RINSE ORAL at 09:37

## 2023-04-19 RX ADMIN — MIDAZOLAM SCH: 5 INJECTION INTRAMUSCULAR; INTRAVENOUS at 23:32

## 2023-04-19 RX ADMIN — CHLORHEXIDINE GLUCONATE SCH ML: 1.2 RINSE ORAL at 20:29

## 2023-04-19 RX ADMIN — IPRATROPIUM BROMIDE AND ALBUTEROL SULFATE SCH ML: .5; 3 SOLUTION RESPIRATORY (INHALATION) at 08:58

## 2023-04-19 RX ADMIN — IPRATROPIUM BROMIDE AND ALBUTEROL SULFATE SCH ML: .5; 3 SOLUTION RESPIRATORY (INHALATION) at 00:25

## 2023-04-19 RX ADMIN — INSULIN ASPART SCH UNIT: 100 INJECTION, SOLUTION INTRAVENOUS; SUBCUTANEOUS at 23:31

## 2023-04-19 RX ADMIN — IPRATROPIUM BROMIDE AND ALBUTEROL SULFATE SCH ML: .5; 3 SOLUTION RESPIRATORY (INHALATION) at 20:03

## 2023-04-19 RX ADMIN — NOREPINEPHRINE BITARTRATE SCH: 1 INJECTION, SOLUTION, CONCENTRATE INTRAVENOUS at 11:27

## 2023-04-19 RX ADMIN — PIPERACILLIN AND TAZOBACTAM SCH MLS/HR: 3; .375 INJECTION, POWDER, FOR SOLUTION INTRAVENOUS at 03:13

## 2023-04-19 RX ADMIN — IPRATROPIUM BROMIDE AND ALBUTEROL SULFATE SCH ML: .5; 3 SOLUTION RESPIRATORY (INHALATION) at 15:25

## 2023-04-19 RX ADMIN — INSULIN ASPART SCH UNIT: 100 INJECTION, SOLUTION INTRAVENOUS; SUBCUTANEOUS at 17:21

## 2023-04-19 RX ADMIN — POTASSIUM BICARBONATE SCH MEQ: 782 TABLET, EFFERVESCENT ORAL at 06:11

## 2023-04-19 RX ADMIN — APIXABAN SCH MG: 5 TABLET, FILM COATED ORAL at 09:37

## 2023-04-19 RX ADMIN — PIPERACILLIN AND TAZOBACTAM SCH MLS/HR: 3; .375 INJECTION, POWDER, FOR SOLUTION INTRAVENOUS at 09:48

## 2023-04-19 NOTE — P.PN
Subjective


Progress Note Date: 04/19/23








Patient is a 67-year-old male with a known history of chronic atrial 

fibrillation on anticoagulation with Eliquis, cardiomyopathy status post ICD 

placement, COPD on home oxygen at 4 L via nasal cannula, hypertension, 

anxiety/depression and history of femoral neck fracture s/p repair in January 2023 was brought to the hospital by EMS status postcardiac arrest.  Patient was 

at [facility] where she was found to have worsening shortness of breath and became 

unresponsive on a motorized scooter..  Patient underwent CPR for about 15 

minutes by EMS and was given 3 doses of epinephrine with return of spontaneous 

circulation and was intubated.  Patient was brought to ER for evaluation.  

Patient was unresponsive and was able to provide any history.


On arrival chest x-ray showed cardiomegaly and mild pulmonary vascular 

congestion.  Correlate with BNP for congestive heart failure.  Right basilar 

patchy airspace opacities may represent pulmonary edema versus infiltrate.  

Endotracheal tube in appropriate position.  Possible NG tube terminating in the 

mid esophagus.


CT head showed no acute intracranial process.  Paranasal sinus disease with air-

fluid level within the left maxillary sinus.  Correlate for acute sinusitis.


Chest CTA showed no evidence of PE.  Bilateral lower lobe and right upper lobe 

consolidation with a scattered multifocal groundglass opacities throughout the 

lungs.  Findings are compatible with pneumonia possibly aspiration.


Multiple acute minimally displaced rib fractures likely related to CPR in the 

setting of cardiac arrest.  Remote to subacute bilateral rib fractures also demo

nstrated.  No pneumothorax.  Cardiomegaly.


Laboratory data showed WBC 9.7 hemoglobin 11.4 platelets 246 and D-dimer 3.1


Initial ABG showed pH of 7.17 PCO2 30 and PO2 75


Sodium 135, potassium 4.3, chloride 104, bicarb is 17 BUN 12 and creatinine 0.76

and blood sugar was 325 and lactic acid 2.9 AST 57 ALT 14 alk phos 73 and 

troponin x1 negative


proBNP 2290


Influenza A B RSV and COVID-19 PCR not detected.





4/16/2023


Patient is in the MICU.  Remains mechanical ventilator.  Off pressor support.


Otherwise patient remains unresponsive.  EEG was ordered and neurology is on 

board.


2D echocardiogram showed ejection fraction 30 to 35% with anterior septal 

hypokinesis.  AICD C interrogation was done.


Cardiology neurology and pulmonary is on board.


Chest x-ray showed persistent right midlung and bibasilar multifocal acute 

infiltrates without edema.  No change from 1 day.


Patient remains on antibiotics Zosyn.  NG tube in place.





4/17/2023





Patient is seen and evaluated and follow-up in the ICU remains on mechanical 

ventilation, FiO2 is 60% with a peep of 5.  Patient is off pressor support 

although continues on as needed Ativan along with IV Keppra and propofol.  

Patient also maintained on IV antibiotics in the form of Zosyn.  Chest x-ray 

today shows stable bilateral interstitial and airspace opacification with 

bilateral small pleural effusions may relate to pulmonary edema or infectious 

etiology.  Blood cultures thus far are negative and sputum culture currently 

pending.  Patient does continue to have low-grade temps 101 early this morning. 

WBC remains within normal limits and pro-calcitonin is 0.49.  Other kidney 

functions within normal limits and blood sugars being monitored.  Per nursing 

staff attempts at weaning were performed although patient became extremely 

tachycardic and restless and not following commands and placed back on sedation.

 An EEG was done although pending at this time.  Repeat CT of the brain early 

this morning shows no acute intracranial hemorrhage or midline shift with mild 

diffuse cerebral atrophy redemonstrated with slightly worsening bilateral spheno

id acute sinusitis.  Multiple medical consultations following an per nursing 

staff Chunyu was notified although unsure of family is aware.  Patient was

on the registry.





04/18/2023





Patient is seen and evaluated in the ICU maintained on mechanical ventilation 

with an FiO2 of 55% and PEEP is 5.  Patient is maintained on IV antibiotics and 

continues to have fevers and cultures thus far been negative.  Patient is 

continued on sedation and off pressor support and remains on hypertonic solution

.  Neurology following as well as pulmonary intensivist with overall poor 

prognosis.  Per nursing staff, family has been discussing possible comfort care 

although not ready to wean at this time.  [facility] also following as patient

is on the registry evaluating the patient.





04/19/2023





Patient continues to be in the ICU on mechanical ventilation with an FiO2 of 50%

and PEEP is 5.  Multiple medical consultations including cardiology, pulmonary 

intensivist, neurology following.  Patient had a CT of the chest abdomen pelvis 

per [facility] services which confirmed bilateral rib fractures involving at 

least 2 ribs through 10 bilaterally with no evidence of acute abdominal process 

and consolidation changes in the lung bases likely secondary to atelectasis and 

hypoventilatory.  Patient is maintained on hypertonic solution along with IV 

Keppra with neurology following and patient remains clinically the same.  No 

further seizure-like activity noted although does continue with jerking and 

twitching type movements once removed from sedation.  Patient is undergoing 

occasional sedation holidays.  [facility] is following as patient is on the 

registry although has not approached family as of yet and family is considering 

terminale weaning and comfort measures.  Cardiology signed off and will follow 

as needed.  Overall prognosis is poor as neurological status continues to be 

unchanged.





Review of systems: Unable to obtain as patient is intubated and on sedation








PHYSICAL EXAMINATION: 


Patient is on mechanical ventilator.  FiO2 is currently 50% with a PEEP of 5


HEENT: Normocephalic. Neck is supple. Pupils reactive. Nostrils clear. Oral 

cavity is moist. 


Neck reveals no JVD, carotid bruits, or thyromegaly. 


CHEST EXAMINATION: Trachea is central. Symmetrical expansion.  Bibasilar 

diminished sounds and coarse breath sounds.  No wheezing.


CARDIAC: Normal S1, S2 with no gallops. No murmurs 


ABDOMEN: Soft. Bowel sounds present.  Nontender.  No organomegaly. No abdominal 

bruits. 


Extremities: Trace lower extremity edema.  No clubbing or cyanosis


Neurologically patient is on mechanical ventilator.  Currently on sedation


Skin: No rash or skin lesions. 


Psychiatric: Could not be assessed at this time


Musculoskeletal: No joint swelling or deformity.





Assessment:





Acute cardiac arrest s/p CPR for about 15 minutes with return of spontaneous 

circulation.  Status post intubation by EMS.


Episodes of nonsustained V. tach prior to cardiac arrest which is most likely 

the cause although awaiting full AICD interrogation report


Possible anoxic brain injury with severe encephalopathy noted an EEG


Chronic atrial fibrillation on anticoagulant Eliquis


History of ICD placement


Hyperglycemia


COPD history 


Chronic hypoxic respiratory failure secondary to COPD on 4 L oxygen via nasal 

cannula


Recent history of femoral neck fracture repair in January 2023


History of IVC filter placement


Anxiety/depression history


Full code





Plan:





Patient is currently in the MICU and remains on mechanical ventilation with an 

FiO2 of 50% and PEEP is 5.  Status post CPR and currently on mechanical vent

ilator.  Downtime was was about 15 minutes.  Possible anoxic brain injury also 

is being considered.  EEG with severe encephalopathy with no epileptiform 

discharges noted maintained on IV Keppra with neurology following.  CT of the 

brain was done showing no acute hemorrhage or acute process with some edema and 

being continued on hypertonic solution


Patient undergoing sedation holidays and unable to follow commands and remains 

unresponsive although becomes tachycardic with hypertension 


Continue with antibiotics for pneumonia with Zosyn.  


Continue during Accu-Cheks and continue with insulin sliding scale.


Patient is off pressor support.


Multiple medical consultations following


Given the downtime and severity of neurological assessment and cardiac arrest, 

overall prognosis is poor.  Patient is currently a full code.  [facility] also

following and evaluating this patient is on registry


Family to discuss further about terminally weaning and comfort measures








The impression and plan of care has been dictated by Myrtle Hyman, Nurse 

Practitioner as directed.





Dr. Chris MD


I have performed a history and examination and MDM of this patient, discussed 

the same with the dictator, and  agree with the dictator's assessment and plan 

as written ,documented as a scribe. Based on total visit time,  I have performed

more than 50% of the visit.  





Objective





- Vital Signs


Vital signs: 


                                   Vital Signs











Temp  98.8 F   04/19/23 08:00


 


Pulse  70   04/19/23 08:00


 


Resp  20   04/19/23 08:00


 


BP  109/43   04/19/23 08:00


 


Pulse Ox  98   04/19/23 08:00


 


FiO2  50   04/19/23 08:00








                                 Intake & Output











 04/18/23 04/19/23 04/19/23





 18:59 06:59 18:59


 


Intake Total 1427.269 868.175 100


 


Output Total 635 435 70


 


Balance 792.269 433.175 30


 


Weight  88 kg 


 


Intake:   


 


   275 50


 


    Sodium Chloride 3%( 325 275 50





    Hypertonic) 500 ml @ 25   





    mls/hr IV .Q20H MIKHAIL Rx#:   





    [id number]   


 


    levETIRAcetam IV 1,500 mg 100  





    In Saline 1 100ml.bag @   





    400 mls/hr IVPB Q12HR MIKHAIL   





    Rx#:[id number]   


 


  Intake, IV Titration 342.269 43.175 





  Amount   


 


    ACETAMINOPHEN IV (For   





    ) 1,000 mg In Empty Bag 1   





    bag @ 400 mls/hr IVPB   





    ONCE STA Rx#:[id number]   


 


    Piperacillin-Tazobactam 3 100  





    .375 gm In Sodium   





    Chloride 0.9% 100 ml @ 25   





    mls/hr IVPB Q8H MIKHAIL Rx#:   





    [id number]   


 


    propofoL 1,000 mg In 142.269 43.175 





    Empty Bag 1 bag @ 15 MCG/   





    KG/MIN 7.348 mls/hr IV .   





    [id number] MIKHAIL Rx#:[id number]   


 


  Tube Feeding 630 550 50


 


  Other 30  


 


Output:   


 


  Urine 635 435 70


 


Other:   


 


  Voiding Method Indwelling Catheter Indwelling Catheter 


 


  # Bowel Movements 1  








                       ABP, PAP, CO, CI - Last Documented











Arterial Blood Pressure        120/49

















- Labs


CBC & Chem 7: 


                                 04/19/23 05:10





                                 04/19/23 11:40


Labs: 


                  Abnormal Lab Results - Last 24 Hours (Table)











  04/18/23 04/18/23 04/18/23 Range/Units





  11:40 18:00 23:55 


 


RBC     (3.80-5.40)  m/uL


 


Hgb     (11.4-16.0)  gm/dL


 


Hct     (34.0-46.0)  %


 


Lymphocytes #     (1.0-4.8)  k/uL


 


D-Dimer     (<0.60)  mg/L FEU


 


Sodium     (137-145)  mmol/L


 


Chloride     ()  mmol/L


 


BUN     (7-17)  mg/dL


 


Creatinine     (0.52-1.04)  mg/dL


 


Glucose     (74-99)  mg/dL


 


POC Glucose (mg/dL)  139 H  165 H  146 H  ()  mg/dL


 


Calcium     (8.4-10.2)  mg/dL


 


AST     (14-36)  U/L


 


Total Protein     (6.3-8.2)  g/dL


 


Albumin     (3.5-5.0)  g/dL


 


Urine Protein     (Negative)  


 


Urine Glucose (UA)     (Negative)  


 


Urine Blood     (Negative)  


 


Urine Bacteria     (None)  /hpf


 


Urine Mucus     (None)  /hpf














  04/19/23 04/19/23 04/19/23 Range/Units





  00:01 01:55 01:55 


 


RBC   2.81 L   (3.80-5.40)  m/uL


 


Hgb   9.1 L   (11.4-16.0)  gm/dL


 


Hct   27.8 L   (34.0-46.0)  %


 


Lymphocytes #   0.6 L   (1.0-4.8)  k/uL


 


D-Dimer    2.67 H  (<0.60)  mg/L FEU


 


Sodium     (137-145)  mmol/L


 


Chloride  120 H    ()  mmol/L


 


BUN  18 H    (7-17)  mg/dL


 


Creatinine  0.50 L    (0.52-1.04)  mg/dL


 


Glucose  170 H    (74-99)  mg/dL


 


POC Glucose (mg/dL)     ()  mg/dL


 


Calcium  7.5 L    (8.4-10.2)  mg/dL


 


AST  90 H    (14-36)  U/L


 


Total Protein  5.4 L    (6.3-8.2)  g/dL


 


Albumin  2.8 L    (3.5-5.0)  g/dL


 


Urine Protein     (Negative)  


 


Urine Glucose (UA)     (Negative)  


 


Urine Blood     (Negative)  


 


Urine Bacteria     (None)  /hpf


 


Urine Mucus     (None)  /hpf














  04/19/23 04/19/23 04/19/23 Range/Units





  05:10 05:10 05:20 


 


RBC  2.71 L    (3.80-5.40)  m/uL


 


Hgb  8.6 L    (11.4-16.0)  gm/dL


 


Hct  26.9 L    (34.0-46.0)  %


 


Lymphocytes #  0.3 L    (1.0-4.8)  k/uL


 


D-Dimer     (<0.60)  mg/L FEU


 


Sodium   146 H   (137-145)  mmol/L


 


Chloride   119 H   ()  mmol/L


 


BUN   18 H   (7-17)  mg/dL


 


Creatinine   0.50 L   (0.52-1.04)  mg/dL


 


Glucose   177 H   (74-99)  mg/dL


 


POC Glucose (mg/dL)     ()  mg/dL


 


Calcium   7.6 L   (8.4-10.2)  mg/dL


 


AST     (14-36)  U/L


 


Total Protein     (6.3-8.2)  g/dL


 


Albumin     (3.5-5.0)  g/dL


 


Urine Protein    Trace H  (Negative)  


 


Urine Glucose (UA)    Trace H  (Negative)  


 


Urine Blood    Small H  (Negative)  


 


Urine Bacteria    Rare H  (None)  /hpf


 


Urine Mucus    Rare H  (None)  /hpf














  04/19/23 Range/Units





  06:00 


 


RBC   (3.80-5.40)  m/uL


 


Hgb   (11.4-16.0)  gm/dL


 


Hct   (34.0-46.0)  %


 


Lymphocytes #   (1.0-4.8)  k/uL


 


D-Dimer   (<0.60)  mg/L FEU


 


Sodium   (137-145)  mmol/L


 


Chloride   ()  mmol/L


 


BUN   (7-17)  mg/dL


 


Creatinine   (0.52-1.04)  mg/dL


 


Glucose   (74-99)  mg/dL


 


POC Glucose (mg/dL)  160 H  ()  mg/dL


 


Calcium   (8.4-10.2)  mg/dL


 


AST   (14-36)  U/L


 


Total Protein   (6.3-8.2)  g/dL


 


Albumin   (3.5-5.0)  g/dL


 


Urine Protein   (Negative)  


 


Urine Glucose (UA)   (Negative)  


 


Urine Blood   (Negative)  


 


Urine Bacteria   (None)  /hpf


 


Urine Mucus   (None)  /hpf








                      Microbiology - Last 24 Hours (Table)











 04/15/23 13:34 Blood Culture - Preliminary





 Blood    No Growth after 72 hours


 


 04/15/23 13:34 Blood Culture - Preliminary





 Blood    No Growth after 72 hours


 


 04/15/23 11:19 Gram Stain - Final





 Sputum Sputum Culture - Final

## 2023-04-19 NOTE — CT
EXAMINATION TYPE: CT ChestAbdPelvis wo con

CT DLP: 1475.2 mGycm, Automated exposure control for dose reduction was used.

 

DATE OF EXAM: 4/19/2023 3:10 AM

 

COMPARISON: 4/15/2023 CT chest, 1/9/2023 CT abdomen pelvis

 

CLINICAL INDICATION:Female, 67 years old with history of GOL evaluation; requesting renal measurement
s; , AMS, CARDIAC ARREST, ABD DISTENTION

 

Technique: Multiple axial images of the chest, abdomen, and pelvis were obtained. Two-dimensional cor
onal and sagittal reconstructions were obtained. 

Contrast used: None

Oral contrast used: without Oral Contrast 

 

Findings: 

 

CHEST:

Extensive motion artifact limits evaluation of the chest.

LUNGS/ PLEURA: Consolidation changes are seen predominantly in the posterior inferior lungs. Findings
 felt to represent atelectasis given bronchial vascular crowding.

AIRWAY: Endotracheal tube terminating above the guzman.

HEART: Measures mildly enlarged for size. Coronary artery cusp patient's are present.

MEDIASTINUM: No gross evidence of adenopathy.

VASCULATURE:  No aortic aneurysm. 

MUSCULOSKELETAL: Multiple right-sided rib fractures involving right rib 2 through 10 and possibly 11 
and left ribs 2 through 10.

SOFT TISSUES/LYMPH NODES: Cardiac conduction device within the left chest wall with leads terminating
 in the right ventricle and right atrium.

LOWER NECK: Nasogastric tube transversing the neck.

 

ABDOMEN:

ABDOMEN

LIVER: Unremarkable

GALLBLADDER AND BILE DUCTS: Unremarkable.

PANCREAS: Unremarkable.

SPLEEN: Unremarkable.

ADRENAL GLANDS: Unremarkable.

KIDNEYS AND URETERS: No evidence of hydronephrosis or renal calculus. The ureters are unremarkable.  


 

PELVIS

BLADDER: Carrasco catheter in place. The bladder is relatively unremarkable.

REPRODUCTIVE: Left ovarian 3.1 cm cyst.

 

ABDOMEN & PELVIS

STOMACH AND BOWEL: No evidence of bowel obstruction. Colon is underneath the diaphragm on the right. 
Compatible with Chilaiditi sign.

PERITONEUM: No evidence of pneumoperitoneum or free fluid.

 

VASCULATURE: No evidence of aortic aneurysm. IVC filter in place. Atherosclerosis of the arterial vas
culature.

MUSCULOSKELETAL: No acute osseous abnormalities, bilateral hip fixation hardware. Hardware appears in
tact. Multilevel disc degeneration changes throughout the spine with scoliosis changes.

LYMPH NODES: No gross evidence for lymphadenopathy.

SOFT TISSUE/ABDOMINAL WALL: Surgical clips in the anterior abdominal wall. 

 

IMPRESSION: 

1.  Sequela of prior CPR with bilateral rib fractures involving at least ribs 2 through 10 bilaterall
y similar left-sided rib fractures appear more subacute.

2.   Motion limited exam for the remainder of the study no evidence for acute abdominal process.

3.  Consolidation changes in lung bases likely secondary to atelectasis and hypoventilatory.

## 2023-04-19 NOTE — P.PN
Subjective


Progress Note Date: 04/19/23 04/19/2023: Patient was seen for a follow-up.  Patient's niece was present at 

this time.  Patient essentially unchanged.  No clinical improvement.  Patient is

having some rhythmic eye twitching when the eyes are open, all with painful 

stimuli.  At present patient is on propofol 20 mcg/kg/m.  With sedation holiday,

she was twitching, almost like seizures, therefore she received Ativan earlier.





04/18/2023: Patient currently on propofol 30 mcg/kg/m.  Per nursing note, with 

sedation holiday for about 20 minutes, patient was not following any commands.  

She became tachypneic therefore was given 1 mg of Ativan, and propofol 

restarted.  Her examination continues to be very abnormal as below.  Per nurse 

report, patient's eyes opens and close, but is not awake.  Only sclerae is 

visible.





04/17/2023: Patient initially seen by Dr. Allen Brannon.  Please refer to his note 

for details.





Patient is a 67-year-old female, who came to the hospital with cardiopulmonary 

arrest on 04/15/2023 at 9:37 AM.  The downtime was about 15 minutes.  Patient's 

sister and another relative was present.  Patient's nurse was also present.





Patient continues to be comatose.  She had sedation holiday performed for about 

half an hour at 9:50 AM in which she was not showing any meaningful response.  

At present patient on propofol 15 mcg/kg/m. patient's nurse mentioned that p

atient became tachycardic and blood pressure went up to 180 systolic, therefore 

she was placed back on propofol.  No seizure-like activity noted.  Patient 

received Ativan 2 mg last night.





Objective





- Vital Signs


Vital signs: 


                                   Vital Signs











Temp  98.8 F   04/19/23 08:00


 


Pulse  72   04/19/23 12:09


 


Resp  21   04/19/23 11:00


 


BP  109/43   04/19/23 08:00


 


Pulse Ox  99   04/19/23 11:00


 


FiO2  50   04/19/23 11:02








                                 Intake & Output











 04/18/23 04/19/23 04/19/23





 18:59 06:59 18:59


 


Intake Total 1427.269 868.175 535


 


Output Total 635 435 160


 


Balance 792.269 433.175 375


 


Weight  88 kg 


 


Intake:   


 


   275 225


 


    Sodium Chloride 3%( 325 275 125





    Hypertonic) 500 ml @ 25   





    mls/hr IV .Q20H [facility] Rx#:   





    [id number]   


 


    levETIRAcetam IV 1,500 mg 100  100





    In Saline 1 100ml.bag @   





    400 mls/hr IVPB Q12HR [facility]   





    Rx#:[id number]   


 


  Intake, IV Titration 342.269 43.175 100





  Amount   


 


    ACETAMINOPHEN IV (For   





    ) 1,000 mg In Empty Bag 1   





    bag @ 400 mls/hr IVPB   





    ONCE [facility] Rx#:[id number]   


 


    Piperacillin-Tazobactam 3 100  100





    .375 gm In Sodium   





    Chloride 0.9% 100 ml @ 25   





    mls/hr IVPB Q8H [facility] Rx#:   





    [id number]   


 


    propofoL 1,000 mg In 142.269 43.175 





    Empty Bag 1 bag @ 15 MCG/   





    KG/MIN 7.348 mls/hr IV .   





    [id number] [facility] Rx#:[id number]   


 


  Tube Feeding 630 550 150


 


  Other 30  60


 


Output:   


 


  Urine 635 435 160


 


Other:   


 


  Voiding Method Indwelling Catheter Indwelling Catheter Indwelling Catheter


 


  # Bowel Movements 1  








                       ABP, PAP, CO, CI - Last Documented











Arterial Blood Pressure        137/55

















- Exam





Patient is comatose, with GCS of 3.  Patient not responding to calling her name 

or painful stimuli.





Patient's eyes are closed, and with painful stimuli, although with manually 

opening the eyes, some rhythmic twitching of the eyes were noted.  Patient has 

some skew deviation of the eye, with the left eye deviated up and to her right, 

and the right eye is looking straight ahead.  Very abnormal extraocular muscles.







Patient is slightly breathing over the ventilator, as she is set at rate of 

20/m, and sometimes it goes up to 21/m..  She has a weak gag.  Her pupils are 

equal, round and reacting.   Oculocephalics are minimally present.  Corneals 

absent.





However no twitching noted at rest of the body otherwise.





- Labs


CBC & Chem 7: 


                                 04/19/23 05:10





                                 04/19/23 11:40


Labs: 


                  Abnormal Lab Results - Last 24 Hours (Table)











  04/18/23 04/18/23 04/19/23 Range/Units





  18:00 23:55 00:01 


 


RBC     (3.80-5.40)  m/uL


 


Hgb     (11.4-16.0)  gm/dL


 


Hct     (34.0-46.0)  %


 


Lymphocytes #     (1.0-4.8)  k/uL


 


D-Dimer     (<0.60)  mg/L FEU


 


Sodium     (137-145)  mmol/L


 


Chloride    120 H  ()  mmol/L


 


BUN    18 H  (7-17)  mg/dL


 


Creatinine    0.50 L  (0.52-1.04)  mg/dL


 


Glucose    170 H  (74-99)  mg/dL


 


POC Glucose (mg/dL)  165 H  146 H   ()  mg/dL


 


Calcium    7.5 L  (8.4-10.2)  mg/dL


 


Phosphorus     (2.5-4.5)  mg/dL


 


AST    90 H  (14-36)  U/L


 


Total Protein    5.4 L  (6.3-8.2)  g/dL


 


Albumin    2.8 L  (3.5-5.0)  g/dL


 


Urine Protein     (Negative)  


 


Urine Glucose (UA)     (Negative)  


 


Urine Blood     (Negative)  


 


Urine Bacteria     (None)  /hpf


 


Urine Mucus     (None)  /hpf














  04/19/23 04/19/23 04/19/23 Range/Units





  01:55 01:55 05:10 


 


RBC  2.81 L   2.71 L  (3.80-5.40)  m/uL


 


Hgb  9.1 L   8.6 L  (11.4-16.0)  gm/dL


 


Hct  27.8 L   26.9 L  (34.0-46.0)  %


 


Lymphocytes #  0.6 L   0.3 L  (1.0-4.8)  k/uL


 


D-Dimer   2.67 H   (<0.60)  mg/L FEU


 


Sodium     (137-145)  mmol/L


 


Chloride     ()  mmol/L


 


BUN     (7-17)  mg/dL


 


Creatinine     (0.52-1.04)  mg/dL


 


Glucose     (74-99)  mg/dL


 


POC Glucose (mg/dL)     ()  mg/dL


 


Calcium     (8.4-10.2)  mg/dL


 


Phosphorus     (2.5-4.5)  mg/dL


 


AST     (14-36)  U/L


 


Total Protein     (6.3-8.2)  g/dL


 


Albumin     (3.5-5.0)  g/dL


 


Urine Protein     (Negative)  


 


Urine Glucose (UA)     (Negative)  


 


Urine Blood     (Negative)  


 


Urine Bacteria     (None)  /hpf


 


Urine Mucus     (None)  /hpf














  04/19/23 04/19/23 04/19/23 Range/Units





  05:10 05:20 06:00 


 


RBC     (3.80-5.40)  m/uL


 


Hgb     (11.4-16.0)  gm/dL


 


Hct     (34.0-46.0)  %


 


Lymphocytes #     (1.0-4.8)  k/uL


 


D-Dimer     (<0.60)  mg/L FEU


 


Sodium  146 H    (137-145)  mmol/L


 


Chloride  119 H    ()  mmol/L


 


BUN  18 H    (7-17)  mg/dL


 


Creatinine  0.50 L    (0.52-1.04)  mg/dL


 


Glucose  177 H    (74-99)  mg/dL


 


POC Glucose (mg/dL)    160 H  ()  mg/dL


 


Calcium  7.6 L    (8.4-10.2)  mg/dL


 


Phosphorus     (2.5-4.5)  mg/dL


 


AST     (14-36)  U/L


 


Total Protein     (6.3-8.2)  g/dL


 


Albumin     (3.5-5.0)  g/dL


 


Urine Protein   Trace H   (Negative)  


 


Urine Glucose (UA)   Trace H   (Negative)  


 


Urine Blood   Small H   (Negative)  


 


Urine Bacteria   Rare H   (None)  /hpf


 


Urine Mucus   Rare H   (None)  /hpf














  04/19/23 04/19/23 04/19/23 Range/Units





  10:00 11:31 11:40 


 


RBC     (3.80-5.40)  m/uL


 


Hgb     (11.4-16.0)  gm/dL


 


Hct     (34.0-46.0)  %


 


Lymphocytes #     (1.0-4.8)  k/uL


 


D-Dimer     (<0.60)  mg/L FEU


 


Sodium  148 H   148 H  (137-145)  mmol/L


 


Chloride  121 H   120 H  ()  mmol/L


 


BUN    18 H  (7-17)  mg/dL


 


Creatinine  0.50 L   0.49 L  (0.52-1.04)  mg/dL


 


Glucose  134 H   156 H  (74-99)  mg/dL


 


POC Glucose (mg/dL)   142 H   ()  mg/dL


 


Calcium  7.7 L   7.9 L  (8.4-10.2)  mg/dL


 


Phosphorus  1.9 L   1.9 L  (2.5-4.5)  mg/dL


 


AST  85 H   90 H  (14-36)  U/L


 


Total Protein  5.4 L   5.4 L  (6.3-8.2)  g/dL


 


Albumin  2.8 L   2.8 L  (3.5-5.0)  g/dL


 


Urine Protein     (Negative)  


 


Urine Glucose (UA)     (Negative)  


 


Urine Blood     (Negative)  


 


Urine Bacteria     (None)  /hpf


 


Urine Mucus     (None)  /hpf








                      Microbiology - Last 24 Hours (Table)











 04/15/23 13:34 Blood Culture - Preliminary





 Blood    No Growth after 72 hours


 


 04/15/23 13:34 Blood Culture - Preliminary





 Blood    No Growth after 72 hours


 


 04/15/23 11:19 Gram Stain - Final





 Sputum Sputum Culture - Final














Assessment and Plan


Assessment: 





Cardiopulmonary arrest with downtime lasting for 15 minutes.  It appears the 

patient had episode of nonsustained V. tach is seems to happen 1 hour prior to 

the cardiac arrest but per cardiology unsure if true VT or A.fib


Anoxic encephalopathy.  Patient's computed tomography scan showing mild diffuse 

cerebral edema.  Some effacement of the lateral ventricles.  Patient has 

slightly preserved brainstem reflexes at this time.  Very abnormal extraocular 

muscles examination.





Reported twitching of body that is intermittent on 04/15/23:  Probable seizures 

due to cardiopulmonary arrest- now resolved.  However patient has intermittent 

rhythmic twitching of the eyelids particularly with sedation holiday, suggestive

of underlying partial status.





Cardiomyopathy with ejection fraction of 30-35%


Status post intubation and mechanical ventilation due to cardiopulmonary last


Non-STEMI


Paroxysmal atrial fibrillation


History of stroke


AICD


Hypertension


CAD with prior PCI


History of green filter placement


Plan: 





Patient is past 96 hours post cardiac arrest.  No clinical improvement.  Her 

examination is severely abnormal.  Prognosis for meaningful recovery appears 

poor at this time.





Patient is showing some obvious low amplitude seizure like activity.  We will 

give loading dose of Dilantin 20 mg/kg bolus dose and we will repeat prolonged 

EEG in the morning.  There is some interaction of Dilantin reported with Eliqu

is.  Check with the pharmacist Thea, as the risks are low 2/5, therefore 

she'll be fine to use Dilantin with Eliquis.  We will maintain on Dilantin 200 

mg twice a day.  Check Dilantin level in the morning.


CT head to follow-up on cerebral edema.  Patient has received 24 hours of 

hypertonic saline.





EEG 04/16/2023 was abnormal routine EEG due to diffuse suppression, which can be

due to either medication-induced or due to anoxia.  The background slowing is 

suggestive of severe encephalopathy.  Otherwise no focal slowing, or 

epileptiform discharges were seen.


Continue Keppra 1500 mg IV every 12 hours (new during this admission).





CT head performed at 5 AM on 04/17/2023 showed no acute intracranial hemorrhage 

or midline shift.  There is mild diffuse cerebral atrophy redemonstrated.  

Resolved left maxillary acute sinusitis.  Slightly worsening bilateral sphenoid 

acute sinusitis.  On my review, there is definite evidence of at least mild 

generalized cerebral edema, with some effacement of the lateral ventricles.  





It appears patient has fever and suspected aspiration pneumonia.  


Will defer management to primary and ICU team.


Patient is over 72 hours post cardiac arrest, with comatose state and severely 

abnormal examination as above.  Prognosis for meaningful recovery appears poor.





Discussed with patient's nurse.


Patient's family to decide CODE STATUS later today.

## 2023-04-19 NOTE — P.PN
Subjective


Progress Note Date: 04/19/23





PROGRESS NOTE


The patient is a 67-year-old female with a known history of CAD, cardiomyopathy,

ICD implantation, atrial fibrillation who presented with cardiac arrest, CPR and

downtime of about 15 minutes who is intubated, unresponsive.  Her echocardiogram

showed an ejection fraction of 30-35% with segmental wall motion abnormality.  

Her blood pressure has been stable but she is having episodes of ventricular 

ectopic activity and short burst of nonsustained ventricular tachycardia.  

According to the device interrogation no defibrillation or shock were done.  She

had an episode of possible nonsustained VT treated with ATP.  She is off 

vasopressors.  She is in sinus mechanism.  Computed tomography scan of the head 

showed no acute changes


April 18:


The patient remains intubated, unresponsive, was febrile.  Her blood pressure 

was elevated, under better control at this time.  She continues to be in sinus 

mechanism.  There is no evidence of malignant arrhythmia.  Her urinary output is

stable.  Her computed tomography scan of the chest showed no acute intracranial 

hemorrhage with mild diffuse cerebral atrophy.  She was evaluated by the 

neurology service, her EEG showed severe encephalopathy.


April 19:


The patient remains intubated, unresponsive.  Hemodynamically stable with no 

evidence of ventricular ectopic activity or atrial fibrillation.  Urinary output

and oxygenation stable.  Family is considering gift of life on Friday.  No 

significant change in the status since yesterday.





Medications:


 insulin, Keppra, Ativan, Zosyn,Eliquis 5 mg twice a day, lisinopril 5 mg twice 

a day, metoprolol tartrate 25 mg twice a day, Lipitor 80 mg daily


PHYSICAL EXAMINATION:


Blood pressure 121/50 heart rate 70, temperature 98.3, intubated and 

unresponsive.  Pupils dilated and fixed


LUNGS: Clear to auscultation, anteriorly


HEART: Regular rate and rhythm, S1, S2. No S3.  systolic ejection murmur


ABDOMEN: Soft, positive bowel sounds, no organomegaly


EXTREMETIES: No edema





LAB:


Hemoglobin 8.6, potassium 3.5, BUN 18, creatinine 0.50


IMPRESSION:


1.  Status post cardiopulmonary arrest with down time of 15 minutes with 

evidence of anoxic encephalopathy, no evidence of improvement so far


2.  History of ischemic heart disease with severely impaired systolic function 

and ICD implantation


3.  History of prior PCI


4.  Paroxysmal atrial fibrillation


5.  History of hypertension, elevated


6.  History of hyperlipidemia


7.  Febrile episode could be related to central fever, resolved





PLAN:


1.  Evidence of severe anoxic encephalopathy with no improvement over the last 

72 hours


2.  Awaiting family decision regarding gift of life


3.  We will see her on an as needed basis, please feel free to call us for any 

question


 








Objective





- Vital Signs


Vital signs: 


                                   Vital Signs











Temp  98.3 F   04/19/23 06:00


 


Pulse  70   04/19/23 07:00


 


Resp  21   04/19/23 07:00


 


BP  121/51   04/19/23 07:00


 


Pulse Ox  97   04/19/23 07:00


 


FiO2  50   04/19/23 04:00








                                 Intake & Output











 04/18/23 04/19/23 04/19/23





 18:59 06:59 18:59


 


Intake Total 1427.269 868.175 75


 


Output Total 635 435 35


 


Balance 792.269 433.175 40


 


Weight  88 kg 


 


Intake:   


 


   275 25


 


    Sodium Chloride 3%( 325 275 25





    Hypertonic) 500 ml @ 25   





    mls/hr IV .Q20H [facility] Rx#:   





    [id number]   


 


    levETIRAcetam IV 1,500 mg 100  





    In Saline 1 100ml.bag @   





    400 mls/hr IVPB Q12HR [facility]   





    Rx#:[id number]   


 


  Intake, IV Titration 342.269 43.175 





  Amount   


 


    ACETAMINOPHEN IV (For   





    ) 1,000 mg In Empty Bag 1   





    bag @ 400 mls/hr IVPB   





    ONCE [facility] Rx#:[id number]   


 


    Piperacillin-Tazobactam 3 100  





    .375 gm In Sodium   





    Chloride 0.9% 100 ml @ 25   





    mls/hr IVPB Q8H [facility] Rx#:   





    [id number]   


 


    propofoL 1,000 mg In 142.269 43.175 





    Empty Bag 1 bag @ 15 MCG/   





    KG/MIN 7.348 mls/hr IV .   





    [id number] [facility] Rx#:[id number]   


 


  Tube Feeding 630 550 50


 


  Other 30  


 


Output:   


 


  Urine 635 435 35


 


Other:   


 


  Voiding Method Indwelling Catheter Indwelling Catheter 


 


  # Bowel Movements 1  








                       ABP, PAP, CO, CI - Last Documented











Arterial Blood Pressure        117/47

















- Labs


CBC & Chem 7: 


                                 04/19/23 05:10





                                 04/19/23 05:10


Labs: 


                  Abnormal Lab Results - Last 24 Hours (Table)











  04/18/23 04/18/23 04/18/23 Range/Units





  11:40 18:00 23:55 


 


RBC     (3.80-5.40)  m/uL


 


Hgb     (11.4-16.0)  gm/dL


 


Hct     (34.0-46.0)  %


 


Lymphocytes #     (1.0-4.8)  k/uL


 


D-Dimer     (<0.60)  mg/L FEU


 


Sodium     (137-145)  mmol/L


 


Chloride     ()  mmol/L


 


BUN     (7-17)  mg/dL


 


Creatinine     (0.52-1.04)  mg/dL


 


Glucose     (74-99)  mg/dL


 


POC Glucose (mg/dL)  139 H  165 H  146 H  ()  mg/dL


 


Calcium     (8.4-10.2)  mg/dL


 


AST     (14-36)  U/L


 


Total Protein     (6.3-8.2)  g/dL


 


Albumin     (3.5-5.0)  g/dL


 


Urine Protein     (Negative)  


 


Urine Glucose (UA)     (Negative)  


 


Urine Blood     (Negative)  


 


Urine Bacteria     (None)  /hpf


 


Urine Mucus     (None)  /hpf














  04/19/23 04/19/23 04/19/23 Range/Units





  00:01 01:55 01:55 


 


RBC   2.81 L   (3.80-5.40)  m/uL


 


Hgb   9.1 L   (11.4-16.0)  gm/dL


 


Hct   27.8 L   (34.0-46.0)  %


 


Lymphocytes #   0.6 L   (1.0-4.8)  k/uL


 


D-Dimer    2.67 H  (<0.60)  mg/L FEU


 


Sodium     (137-145)  mmol/L


 


Chloride  120 H    ()  mmol/L


 


BUN  18 H    (7-17)  mg/dL


 


Creatinine  0.50 L    (0.52-1.04)  mg/dL


 


Glucose  170 H    (74-99)  mg/dL


 


POC Glucose (mg/dL)     ()  mg/dL


 


Calcium  7.5 L    (8.4-10.2)  mg/dL


 


AST  90 H    (14-36)  U/L


 


Total Protein  5.4 L    (6.3-8.2)  g/dL


 


Albumin  2.8 L    (3.5-5.0)  g/dL


 


Urine Protein     (Negative)  


 


Urine Glucose (UA)     (Negative)  


 


Urine Blood     (Negative)  


 


Urine Bacteria     (None)  /hpf


 


Urine Mucus     (None)  /hpf














  04/19/23 04/19/23 04/19/23 Range/Units





  05:10 05:10 05:20 


 


RBC  2.71 L    (3.80-5.40)  m/uL


 


Hgb  8.6 L    (11.4-16.0)  gm/dL


 


Hct  26.9 L    (34.0-46.0)  %


 


Lymphocytes #  0.3 L    (1.0-4.8)  k/uL


 


D-Dimer     (<0.60)  mg/L FEU


 


Sodium   146 H   (137-145)  mmol/L


 


Chloride   119 H   ()  mmol/L


 


BUN   18 H   (7-17)  mg/dL


 


Creatinine   0.50 L   (0.52-1.04)  mg/dL


 


Glucose   177 H   (74-99)  mg/dL


 


POC Glucose (mg/dL)     ()  mg/dL


 


Calcium   7.6 L   (8.4-10.2)  mg/dL


 


AST     (14-36)  U/L


 


Total Protein     (6.3-8.2)  g/dL


 


Albumin     (3.5-5.0)  g/dL


 


Urine Protein    Trace H  (Negative)  


 


Urine Glucose (UA)    Trace H  (Negative)  


 


Urine Blood    Small H  (Negative)  


 


Urine Bacteria    Rare H  (None)  /hpf


 


Urine Mucus    Rare H  (None)  /hpf














  04/19/23 Range/Units





  06:00 


 


RBC   (3.80-5.40)  m/uL


 


Hgb   (11.4-16.0)  gm/dL


 


Hct   (34.0-46.0)  %


 


Lymphocytes #   (1.0-4.8)  k/uL


 


D-Dimer   (<0.60)  mg/L FEU


 


Sodium   (137-145)  mmol/L


 


Chloride   ()  mmol/L


 


BUN   (7-17)  mg/dL


 


Creatinine   (0.52-1.04)  mg/dL


 


Glucose   (74-99)  mg/dL


 


POC Glucose (mg/dL)  160 H  ()  mg/dL


 


Calcium   (8.4-10.2)  mg/dL


 


AST   (14-36)  U/L


 


Total Protein   (6.3-8.2)  g/dL


 


Albumin   (3.5-5.0)  g/dL


 


Urine Protein   (Negative)  


 


Urine Glucose (UA)   (Negative)  


 


Urine Blood   (Negative)  


 


Urine Bacteria   (None)  /hpf


 


Urine Mucus   (None)  /hpf








                      Microbiology - Last 24 Hours (Table)











 04/15/23 13:34 Blood Culture - Preliminary





 Blood    No Growth after 72 hours


 


 04/15/23 13:34 Blood Culture - Preliminary





 Blood    No Growth after 72 hours


 


 04/15/23 11:19 Gram Stain - Final





 Sputum Sputum Culture - Final

## 2023-04-19 NOTE — P.PN
Subjective


Progress Note Date: 04/18/23 04/18/2023: Patient currently on propofol 30 mcg/kg/m.  Per nursing note, with 

sedation holiday for about 20 minutes, patient was not following any commands.  

She became tachypneic therefore was given 1 mg of Ativan, and propofol 

restarted.  Her examination continues to be very abnormal as below.  Per nurse 

report, patient's eyes opens and close, but is not awake.  Only sclerae is 

visible.





04/17/2023: Patient initially seen by Dr. Allen Brannon.  Please refer to his note 

for details.





Patient is a 67-year-old female, who came to the hospital with cardiopulmonary 

arrest on 04/15/2023 at 9:37 AM.  The downtime was about 15 minutes.  Patient's 

sister and another relative was present.  Patient's nurse was also present.





Patient continues to be comatose.  She had sedation holiday performed for about 

half an hour at 9:50 AM in which she was not showing any meaningful response.  

At present patient on propofol 15 mcg/kg/m. patient's nurse mentioned that 

patient became tachycardic and blood pressure went up to 180 systolic, therefore

she was placed back on propofol.  No seizure-like activity noted.  Patient 

received Ativan 2 mg last night.





Objective





- Vital Signs


Vital signs: 


                                   Vital Signs











Temp  101.5 F H  04/18/23 10:00


 


Pulse  74   04/18/23 11:00


 


Resp  25 H  04/18/23 11:00


 


BP  133/78   04/16/23 21:00


 


Pulse Ox  96   04/18/23 11:00


 


FiO2  50   04/18/23 10:30








                                 Intake & Output











 04/17/23 04/18/23 04/18/23





 18:59 06:59 18:59


 


Intake Total 1225.000 887.027 740.444


 


Output Total 405 615 325


 


Balance 820.000 272.027 415.444


 


Weight 84 kg 86 kg 


 


Intake:   


 


   275 225


 


    Sodium Chloride 0.9% 1, 675  





    000 ml @ 75 mls/hr IV .   





    [id number] STA Rx#:[id number]   


 


    Sodium Chloride 3%(  275 125





    Hypertonic) 500 ml @ 25   





    mls/hr IV .Q20H Counts include 234 beds at the [facility] Rx#:   





    [id number]   


 


    levETIRAcetam IV 1,500 mg   100





    In Saline 1 100ml.bag @   





    400 mls/hr IVPB Q12HR MIKHAIL   





    Rx#:[id number]   


 


  Intake, IV Titration 300.000 72.027 285.444





  Amount   


 


    ACETAMINOPHEN IV (For NPO   100





    ) 1,000 mg In Empty Bag 1   





    bag @ 400 mls/hr IVPB   





    ONCE STA Rx#:[id number]   


 


    Piperacillin-Tazobactam 3 100  100





    .375 gm In Sodium   





    Chloride 0.9% 100 ml @ 25   





    mls/hr IVPB Q8H MIKHAIL Rx#:   





    [id number]   


 


    Sodium Chloride 0.9% 1, 75  





    000 ml @ 75 mls/hr IV .   





    [id number] STA Rx#:[id number]   


 


    Sodium Chloride 3%( 25 25 





    Hypertonic) 500 ml @ 25   





    mls/hr IV .Q20H MIKHAIL Rx#:   





    [id number]   


 


    propofoL 1,000 mg In 100.000 47.027 85.444





    Empty Bag 1 bag @ 15 MCG/   





    KG/MIN 7.348 mls/hr IV .   





    [id number] MIKHAIL Rx#:[id number]   


 


  Tube Feeding 190 430 230


 


  Other 60 110 


 


Output:   


 


  Urine 405 615 325


 


Other:   


 


  Voiding Method Indwelling Catheter Indwelling Catheter 








                       ABP, PAP, CO, CI - Last Documented











Arterial Blood Pressure        132/42

















- Exam





Patient is comatose, with GCS of 3.  Patient not responding to calling her name 

or painful stimuli.





Patient's eyes are partially open, but are completely deviated up.  Only sclera 

is visible with are partially open lids.  On manually opening the eyes, it 

appeared patient's gaze was upwards into her right.  And then slowly the gaze 

went to the up and left side.  Very abnormal extraocular muscles.  Some eye 

flickering was noted.





Patient is slightly breathing over the ventilator.  She has a weak gag.  Her 

pupils are equal, round and reacting.   Oculocephalics are minimally present.  

Corneals present.





However no twitching noted at rest otherwise.





- Labs


CBC & Chem 7: 


                                 04/19/23 05:10





                                 04/19/23 05:10


Labs: 


                  Abnormal Lab Results - Last 24 Hours (Table)











  04/17/23 04/17/23 04/17/23 Range/Units





  17:59 18:25 23:37 


 


RBC     (3.80-5.40)  m/uL


 


Hgb     (11.4-16.0)  gm/dL


 


Hct     (34.0-46.0)  %


 


Lymphocytes #     (1.0-4.8)  k/uL


 


ABG pCO2     (35-45)  mmHg


 


ABG pO2     ()  mmHg


 


ABG O2 Saturation     (94-97)  %


 


Chloride     ()  mmol/L


 


Glucose     (74-99)  mg/dL


 


POC Glucose (mg/dL)  145 H  113 H  130 H  ()  mg/dL


 


Calcium     (8.4-10.2)  mg/dL


 


AST     (14-36)  U/L


 


Total Protein     (6.3-8.2)  g/dL


 


Albumin     (3.5-5.0)  g/dL














  04/18/23 04/18/23 04/18/23 Range/Units





  04:10 04:10 04:10 


 


RBC   2.93 L   (3.80-5.40)  m/uL


 


Hgb   9.3 L   (11.4-16.0)  gm/dL


 


Hct   28.9 L   (34.0-46.0)  %


 


Lymphocytes #   0.7 L   (1.0-4.8)  k/uL


 


ABG pCO2     (35-45)  mmHg


 


ABG pO2     ()  mmHg


 


ABG O2 Saturation     (94-97)  %


 


Chloride  114 H   116 H  ()  mmol/L


 


Glucose  149 H   149 H  (74-99)  mg/dL


 


POC Glucose (mg/dL)     ()  mg/dL


 


Calcium  7.3 L   7.3 L  (8.4-10.2)  mg/dL


 


AST  81 H    (14-36)  U/L


 


Total Protein  5.7 L    (6.3-8.2)  g/dL


 


Albumin  3.0 L    (3.5-5.0)  g/dL














  04/18/23 04/18/23 04/18/23 Range/Units





  05:04 05:06 11:40 


 


RBC     (3.80-5.40)  m/uL


 


Hgb     (11.4-16.0)  gm/dL


 


Hct     (34.0-46.0)  %


 


Lymphocytes #     (1.0-4.8)  k/uL


 


ABG pCO2  34 L    (35-45)  mmHg


 


ABG pO2  112 H    ()  mmHg


 


ABG O2 Saturation  97.5 H    (94-97)  %


 


Chloride     ()  mmol/L


 


Glucose     (74-99)  mg/dL


 


POC Glucose (mg/dL)   141 H  139 H  ()  mg/dL


 


Calcium     (8.4-10.2)  mg/dL


 


AST     (14-36)  U/L


 


Total Protein     (6.3-8.2)  g/dL


 


Albumin     (3.5-5.0)  g/dL








                      Microbiology - Last 24 Hours (Table)











 04/15/23 11:19 Gram Stain - Final





 Sputum Sputum Culture - Final


 


 04/15/23 13:34 Blood Culture - Preliminary





 Blood    No Growth after 48 hours


 


 04/15/23 13:34 Blood Culture - Preliminary





 Blood    No Growth after 48 hours














Assessment and Plan


Assessment: 





Cardiopulmonary arrest lasting for 15 minutes.  It appears the patient had 

episode of nonsustained V. tach is seems to happen 1 hour prior to the cardiac 

arrest but per cardiology unsure if true VT or A.fib


Anoxic encephalopathy.  Patient's computed tomography scan showing mild diffuse 

cerebral edema.  Some effacement of the lateral ventricles.  Patient has 

slightly preserved brainstem reflexes at this time.  Very abnormal extraocular 

muscles examination.


Reported twitching of body that is intermittent on 04/15/23:  Probable seizures 

due to cardiopulmonary arrest- now resolved


Cardiomyopathy with ejection fraction of 30-35%


Status post intubation and mechanical ventilation due to cardiopulmonary last


Non-STEMI


Paroxysmal atrial fibrillation


History of stroke


AICD


Hypertension


CAD with prior PCI


History of green filter placement


Plan: 





Patient is past 72 hours post cardiac arrest.  No clinical improvement.  Her 

examination is severely abnormal.





EEG performed 04/16/2023 was abnormal routine EEG due to diffuse suppression, 

which can be due to either medication-induced or due to anoxia.  The background 

slowing is suggestive of severe encephalopathy.  Otherwise no focal slowing, or 

epileptiform discharges were seen.


Continue Keppra 1500 mg IV every 12 hours (new during this admission).





CT head performed early this morning at 5 AM on 04/17/2023 showed no acute 

intracranial hemorrhage or midline shift.  There is mild diffuse cerebral 

atrophy redemonstrated.  Resolved left maxillary acute sinusitis.  Slightly 

worsening bilateral sphenoid acute sinusitis.  On my review, there is definite 

evidence of at least mild generalized cerebral edema, with some effacement of 

the lateral ventricles.  





It appears patient has fever and suspected aspiration pneumonia.  


Will defer management to primary and ICU team.


Patient is over 72 hours post cardiac arrest, with comatose state and severely 

abnormal examination as above.  Prognosis for meaningful recovery appears very 

guarded to poor.


Continue hypertonic saline for cerebral edema noted on computed tomography scan.


Discussed with patient's nurse.

## 2023-04-19 NOTE — P.PN
Subjective


Progress Note Date: 04/18/23








Patient is a 67-year-old male with a known history of chronic atrial 

fibrillation on anticoagulation with Eliquis, cardiomyopathy status post ICD 

placement, COPD on home oxygen at 4 L via nasal cannula, hypertension, 

anxiety/depression and history of femoral neck fracture s/p repair in January 2023 was brought to the hospital by EMS status postcardiac arrest.  Patient was 

at [facility] where she was found to have worsening shortness of breath and became 

unresponsive on a motorized scooter..  Patient underwent CPR for about 15 

minutes by EMS and was given 3 doses of epinephrine with return of spontaneous 

circulation and was intubated.  Patient was brought to ER for evaluation.  

Patient was unresponsive and was able to provide any history.


On arrival chest x-ray showed cardiomegaly and mild pulmonary vascular 

congestion.  Correlate with BNP for congestive heart failure.  Right basilar 

patchy airspace opacities may represent pulmonary edema versus infiltrate.  

Endotracheal tube in appropriate position.  Possible NG tube terminating in the 

mid esophagus.


CT head showed no acute intracranial process.  Paranasal sinus disease with air-

fluid level within the left maxillary sinus.  Correlate for acute sinusitis.


Chest CTA showed no evidence of PE.  Bilateral lower lobe and right upper lobe 

consolidation with a scattered multifocal groundglass opacities throughout the 

lungs.  Findings are compatible with pneumonia possibly aspiration.


Multiple acute minimally displaced rib fractures likely related to CPR in the 

setting of cardiac arrest.  Remote to subacute bilateral rib fractures also demo

nstrated.  No pneumothorax.  Cardiomegaly.


Laboratory data showed WBC 9.7 hemoglobin 11.4 platelets 246 and D-dimer 3.1


Initial ABG showed pH of 7.17 PCO2 30 and PO2 75


Sodium 135, potassium 4.3, chloride 104, bicarb is 17 BUN 12 and creatinine 0.76

and blood sugar was 325 and lactic acid 2.9 AST 57 ALT 14 alk phos 73 and 

troponin x1 negative


proBNP 2290


Influenza A B RSV and COVID-19 PCR not detected.





4/16/2023


Patient is in the MICU.  Remains mechanical ventilator.  Off pressor support.


Otherwise patient remains unresponsive.  EEG was ordered and neurology is on 

board.


2D echocardiogram showed ejection fraction 30 to 35% with anterior septal 

hypokinesis.  AICD C interrogation was done.


Cardiology neurology and pulmonary is on board.


Chest x-ray showed persistent right midlung and bibasilar multifocal acute 

infiltrates without edema.  No change from 1 day.


Patient remains on antibiotics Zosyn.  NG tube in place.





4/17/2023





Patient is seen and evaluated and follow-up in the ICU remains on mechanical 

ventilation, FiO2 is 60% with a peep of 5.  Patient is off pressor support 

although continues on as needed Ativan along with IV Keppra and propofol.  

Patient also maintained on IV antibiotics in the form of Zosyn.  Chest x-ray 

today shows stable bilateral interstitial and airspace opacification with 

bilateral small pleural effusions may relate to pulmonary edema or infectious 

etiology.  Blood cultures thus far are negative and sputum culture currently 

pending.  Patient does continue to have low-grade temps 101 early this morning. 

WBC remains within normal limits and pro-calcitonin is 0.49.  Other kidney 

functions within normal limits and blood sugars being monitored.  Per nursing 

staff attempts at weaning were performed although patient became extremely 

tachycardic and restless and not following commands and placed back on sedation.

 An EEG was done although pending at this time.  Repeat CT of the brain early 

this morning shows no acute intracranial hemorrhage or midline shift with mild 

diffuse cerebral atrophy redemonstrated with slightly worsening bilateral spheno

id acute sinusitis.  Multiple medical consultations following an per nursing 

staff [facility] life was notified although unsure of family is aware.  Patient was

on the registry.





04/18/2023





Patient is seen and evaluated in the ICU maintained on mechanical ventilation 

with an FiO2 of 55% and PEEP is 5.  Patient is maintained on IV antibiotics and 

continues to have fevers and cultures thus far been negative.  Patient is 

continued on sedation and off pressor support and remains on hypertonic solution

.  Neurology following as well as pulmonary intensivist with overall poor 

prognosis.  Per nursing staff, family has been discussing possible comfort care 

although not ready to wean at this time.  [facility] also following as patient

is on the registry evaluating the patient.





Review of systems: Unable to obtain as patient is intubated








PHYSICAL EXAMINATION: 


Patient is on mechanical ventilator.  FiO2 is currently 55% with a PEEP of 5


HEENT: Normocephalic. Neck is supple. Pupils reactive. Nostrils clear. Oral 

cavity is moist. 


Neck reveals no JVD, carotid bruits, or thyromegaly. 


CHEST EXAMINATION: Trachea is central. Symmetrical expansion.  Bibasilar 

diminished sounds and coarse breath sounds.  No wheezing.


CARDIAC: Normal S1, S2 with no gallops. No murmurs 


ABDOMEN: Soft. Bowel sounds present.  Nontender.  No organomegaly. No abdominal 

bruits. 


Extremities: Trace lower extremity edema.  No clubbing or cyanosis


Neurologically patient is on mechanical ventilator.  Currently on sedation


Skin: No rash or skin lesions. 


Psychiatric: Could not be assessed at this time


Musculoskeletal: No joint swelling or deformity.





Assessment:





Acute cardiac arrest s/p CPR for about 15 minutes with return of spontaneous 

circulation.  Status post intubation by EMS.


Episodes of nonsustained V. tach.  Awaiting full AICD interrogation report


Possible anoxic brain injury with severe encephalopathy noted an EEG


Chronic atrial fibrillation on anticoagulant Eliquis


History of ICD placement


Hyperglycemia


COPD history 


Chronic hypoxic respiratory failure secondary to COPD on 4 L oxygen via nasal 

cannula


Recent history of femoral neck fracture repair in January 2023


History of IVC filter placement


Anxiety/depression history


Full code





Plan:





Patient is currently in the MICU and remains on mechanical ventilation with an 

FiO2 of 55% and PEEP is 5.  Status post CPR and currently on mechanical venti

lator.  Downtime was was about 15 minutes.  Possible anoxic brain injury also is

being considered.  EEG with severe encephalopathy with no epileptiform 

discharges noted maintained on IV Keppra with neurology following.  CT of the 

brain was done showing no acute hemorrhage or acute process with some edema and 

being started on hypertonic solution


Patient undergoing sedation holidays and unable to follow commands and remains 

unresponsive although becomes tachycardic with hypertension 


Neurology recommend monitoring overnight and reevaluate in the a.m.


Continue with antibiotics for pneumonia with Zosyn.  


Continue during Accu-Cheks and continue with insulin sliding scale.


Patient is off pressor support.


Multiple medical consultations following


Given the downtime in severity cardiac arrest overall prognosis is poor.  

Patient is currently a full code.  [facility] also following and evaluating 

this patient is on registry








The impression and plan of care has been dictated by Myrtle Hyman Nurse 

Practitioner as directed.





Dr. Chris MD


I have performed a history and examination and MDM of this patient, discussed 

the same with the dictator, and  agree with the dictator's assessment and plan 

as written ,documented as a scribe. Based on total visit time,  I have performed

more than 50% of the visit.  





Objective





- Vital Signs


Vital signs: 


                                   Vital Signs











Temp  102.4 F H  04/18/23 08:00


 


Pulse  87   04/18/23 08:43


 


Resp  28 H  04/18/23 08:00


 


BP  133/78   04/16/23 21:00


 


Pulse Ox  95   04/18/23 08:00


 


FiO2  50   04/18/23 08:31








                                 Intake & Output











 04/17/23 04/18/23 04/18/23





 18:59 06:59 18:59


 


Intake Total 1225.000 887.027 172.621


 


Output Total 405 615 175


 


Balance 820.000 272.027 -2.379


 


Weight 84 kg 86 kg 


 


Intake:   


 


   275 50


 


    Sodium Chloride 0.9% 1, 675  





    000 ml @ 75 mls/hr IV .   





    [id number] STA Rx#:[id number]   


 


    Sodium Chloride 3%(  275 50





    Hypertonic) 500 ml @ 25   





    mls/hr IV .Q20H MIKHAIL Rx#:   





    [id number]   


 


  Intake, IV Titration 300.000 72.027 42.621





  Amount   


 


    Piperacillin-Tazobactam 3 100  





    .375 gm In Sodium   





    Chloride 0.9% 100 ml @ 25   





    mls/hr IVPB Q8H MIKHAIL Rx#:   





    [id number]   


 


    Sodium Chloride 0.9% 1, 75  





    000 ml @ 75 mls/hr IV .   





    [id number] STA Rx#:[id number]   


 


    Sodium Chloride 3%( 25 25 





    Hypertonic) 500 ml @ 25   





    mls/hr IV .Q20H MIKHAIL Rx#:   





    [id number]   


 


    propofoL 1,000 mg In 100.000 47.027 42.621





    Empty Bag 1 bag @ 15 MCG/   





    KG/MIN 7.348 mls/hr IV .   





    [id number] MIKHAIL Rx#:[id number]   


 


  Tube Feeding 190 430 80


 


  Other 60 110 


 


Output:   


 


  Urine 405 615 175


 


Other:   


 


  Voiding Method Indwelling Catheter Indwelling Catheter 








                       ABP, PAP, CO, CI - Last Documented











Arterial Blood Pressure        153/53

















- Labs


CBC & Chem 7: 


                                 04/19/23 05:10





                                 04/19/23 05:10


Labs: 


                  Abnormal Lab Results - Last 24 Hours (Table)











  04/17/23 04/17/23 04/17/23 Range/Units





  11:27 17:59 18:25 


 


RBC     (3.80-5.40)  m/uL


 


Hgb     (11.4-16.0)  gm/dL


 


Hct     (34.0-46.0)  %


 


Lymphocytes #     (1.0-4.8)  k/uL


 


ABG pCO2     (35-45)  mmHg


 


ABG pO2     ()  mmHg


 


ABG O2 Saturation     (94-97)  %


 


Chloride     ()  mmol/L


 


Glucose     (74-99)  mg/dL


 


POC Glucose (mg/dL)  152 H  145 H  113 H  ()  mg/dL


 


Calcium     (8.4-10.2)  mg/dL


 


AST     (14-36)  U/L


 


Total Protein     (6.3-8.2)  g/dL


 


Albumin     (3.5-5.0)  g/dL














  04/17/23 04/18/23 04/18/23 Range/Units





  23:37 04:10 04:10 


 


RBC    2.93 L  (3.80-5.40)  m/uL


 


Hgb    9.3 L  (11.4-16.0)  gm/dL


 


Hct    28.9 L  (34.0-46.0)  %


 


Lymphocytes #    0.7 L  (1.0-4.8)  k/uL


 


ABG pCO2     (35-45)  mmHg


 


ABG pO2     ()  mmHg


 


ABG O2 Saturation     (94-97)  %


 


Chloride   114 H   ()  mmol/L


 


Glucose   149 H   (74-99)  mg/dL


 


POC Glucose (mg/dL)  130 H    ()  mg/dL


 


Calcium   7.3 L   (8.4-10.2)  mg/dL


 


AST   81 H   (14-36)  U/L


 


Total Protein   5.7 L   (6.3-8.2)  g/dL


 


Albumin   3.0 L   (3.5-5.0)  g/dL














  04/18/23 04/18/23 04/18/23 Range/Units





  04:10 05:04 05:06 


 


RBC     (3.80-5.40)  m/uL


 


Hgb     (11.4-16.0)  gm/dL


 


Hct     (34.0-46.0)  %


 


Lymphocytes #     (1.0-4.8)  k/uL


 


ABG pCO2   34 L   (35-45)  mmHg


 


ABG pO2   112 H   ()  mmHg


 


ABG O2 Saturation   97.5 H   (94-97)  %


 


Chloride  116 H    ()  mmol/L


 


Glucose  149 H    (74-99)  mg/dL


 


POC Glucose (mg/dL)    141 H  ()  mg/dL


 


Calcium  7.3 L    (8.4-10.2)  mg/dL


 


AST     (14-36)  U/L


 


Total Protein     (6.3-8.2)  g/dL


 


Albumin     (3.5-5.0)  g/dL








                      Microbiology - Last 24 Hours (Table)











 04/15/23 11:19 Gram Stain - Final





 Sputum Sputum Culture - Final


 


 04/15/23 13:34 Blood Culture - Preliminary





 Blood    No Growth after 48 hours


 


 04/15/23 13:34 Blood Culture - Preliminary





 Blood    No Growth after 48 hours

## 2023-04-19 NOTE — XR
EXAMINATION TYPE: XR chest 1V portable

 

DATE OF EXAM: 4/19/2023

 

COMPARISON: 4/18/2023

 

HISTORY: Tube placement.

 

TECHNIQUE: Single frontal view of the chest is obtained.

 

FINDINGS:  

 

There is an endotracheal tube terminating approximately 4 cm from the guzman, left-sided AICD, and mu
ltiple overlying cardiac monitoring leads. There has been removal of the previously seen orogastric t
ube.

 

The heart size is unchanged. The cardiothymic silhouette is unchanged. There is redemonstration of sm
all bilateral pleural effusions with adjacent airspace disease which is stable when compared to previ
ous examination. There is no pneumothorax.

 

IMPRESSION:  Stable exam, as above.

## 2023-04-19 NOTE — XR
EXAMINATION TYPE: XR chest 1V

 

DATE OF EXAM: 4/19/2023

 

COMPARISON: 4/18/2023, 4/19/2023

 

INDICATION: OG tube placement

 

TECHNIQUE: Single frontal view of the chest is obtained.

 

FINDINGS:  

The heart size is mildly prominent.  

The pulmonary vasculature is normal.  

Left lower lobe infiltrate may be present. Pacemaker overlies the left chest.  

Endotracheal tube is present with the tip above the guzman. Nasogastric tube transverses the thorax w
ith the tip in left upper quadrant of the abdomen. There is elevation of the right diaphragm.

 

IMPRESSION:  

1. Nasogastric tube tip is within the left upper quadrant.

2. Left lower lobe infiltrate.

3. Cardiomegaly

## 2023-04-19 NOTE — P.PN
Subjective


Progress Note Date: 04/19/23








67-year-old female who apparently had a cardiopulmonary arrest, at a local 

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  The patient apparently had a downtime of about 15 

minutes, for there was cardiopulmonary resuscitation and return of spontaneous 

circulation.  The patient was seen in the ER, by the ER physician, Dr. Pennington.  

The patient was vented, and a right femoral vein triple lumen catheter was 

placed.  Family members are in the room, we selected see the patient.  The 

patient herself is unresponsive.  She has a orally placed endotracheal tube.  

She's currently on the ventilator, with vent settings of volume assist control, 

rate 20, tidal volume 375, FiO2 100%, 5.  Blood gases show pO2 75, pCO2 of 53, 

and a pH is 7.17.  The patient is on norepinephrine at 0.15 mcg/kg/m, and 

propofol at 15 mcg/kg/m.  The patient has a history of COPD from secondhand 

tobacco exposure, a previous history of the fibrillator placement for 

cardiomyopathy, and history of stroke, and [place] filter placement.  In 

addition, the patient is on home O2, that she is supposed to use all the time, 

but she only uses as needed.  She was a smoker in the distant past.  White count

9.7, hemoglobin 11.4, hematocrit 37.9, and platelet count was normal.  D-dimer 

was 3.10.  Sodium 137, potassium 4.3, chlorides 104, CO2 17, anion gap 16, BUN 

and creatinine 12 and 0.76.  Troponin was 0.022 and N-terminal proBNP was 2290. 

Lactate was not measured but is probably elevated.  Testing for influenza A, and

B, RSV, and coronavirus are all negative.  Chest x-ray shows evidence of 

cardiomegaly, fluid overload, and an appropriately placed endotracheal tube.  

Computed tomography scan of the brain showed nothing acute.  CT angiogram was 

negative for PE.  There also may be some right lower lobe consolidation 

consistent with prior aspiration.





Progress note dated 04/16/2023.





67-year-old female who had an out-of-hospital cardiopulmonary arrest.  The 

patient had about 15 minutes of resuscitation before there was return of 

spontaneous circulation.  Unfortunately, the patient may have sustained anoxic 

brain injury.  Today, we place an art line.  She remains on the ventilator.  I 

did have neurology see her.  She is on volume assist control, rate 20, tidal 

volume 375, FiO2 60%, PEEP of 5.  Arterial blood gases show pO2 of 90, pCO2 34, 

and pH is 7.35.  The patient is on norepinephrine at 0.05 mcg/kg/m, propofol at 

40 mcg/kg/m, and saline at 75 mL an hour.  We will discontinue the Levaquin and 

Flagyl and start Zosyn.  In addition we'll start some tube feeds, at 10 mL an 

hour.  A right radial art line will be placed today.  White count 8.9, 

hemoglobin 10.8, hematocrit 34.1, and platelet count is normal.  Sodium 139, 

potassium 3.9, chlorides 112, CO2 18, BUN 19, creatinine 0.71.  Troponins were 

0.086 and 0.091.  Chest x-ray shows persistent bilateral infiltrates, more so in

the right lung than on the left.  This may relate to aspiration.








On today's evaluation of 04/18/2023, seeing the patient for a follow-up.  This 

is a case of a cardiac arrest with at least 15 minutes downtime.  The patient 

has an AICD in place.  Initial cardiac rhythm is not clear.  The did not have 

any pacemaker discharges and the pacemaker has not been interrogated yet.  Noted

the patient has a pacer AICD in place.  Her current cardiac rhythm is paced and 

her rate is currently at 76.  Hemodynamically, the patient is stable on no 

pressors.  The patient is maintaining her own blood pressure.  Meanwhile, the 

patient's troponins did not rise and the troponin peaked at 0.09.  The patient 

currently has signs of anoxic encephalopathy.  Attempts to wean her off the 

sedation yesterday feel that the patient became quite hypertensive and 

tachypneic and she did not show any reasonable neurological recovery.  Neurology

is on the case for the potential anoxic encephalopathy.  CAT scan of the brain 

was done on 04/15/2023 and 04/17/2023 and there is mild diffuse cerebral atrophy

without any acute brain changes.  There is bilateral sphenoid sinus disease.  

EEG was also done on 04/16/2023 indicating diffuse suppression due to anoxia.  

The patient is currently on propofol which is running at the rate of 15 

mcg/kg/m.  The patient is also on IV Keppra 1.5 g every 12 hours.  This morning,

the patient remains on a mechanical ventilator patient is on assist control mode

at the rate of 20, tidal volume of 375, FiO2 of 50% with a PEEP of 5.  The blood

gas shows a pH of 7.42 with a pCO2 of 34 and pO2 112.  Sodium is at 141 with a 

potassium level of 4.4, BUN is at 17 with a creatinine of 0.59.  WBC count at 

7.2 with a hemoglobin 9.3 and a platelet count of 163.  The chest x-ray from 

this morning is showing cardiomegaly along with bilateral pleural effusion.  ET 

tube is in a good location.  The patient also has a orogastric tube in place.  A

CT angiogram of the chest that was done on 04/15/2023 showed bilateral lower 

lobe and upper lobe consolidation and scattered areas of multifocal groundglass 

opacities throughout the lung.  Possibility of aspiration was considered in 

addition to underlying cardiomegaly and multiple acute minimally displaced 

anterior rib fractures in the setting of CPR.  IV fluids are currently at the 

rate of 25 mL an hour of 3% hypertonic saline.  This was initiated by neurology 

regarding the potential of CNS swelling.  As mentioned, the sodium level is at 

141 from this morning.  No seizure activity has been noted.Patient is currently 

on antibiotic without liquids 5 mg by mouth twice a day.  The patient is on met

oprolol 25 mg by mouth twice a day.  The patient is on Zestril 5 mg by mouth 

twice a day.  The patient is also on empiric antibiotic coverage with IV Zosyn. 

The echocardiogram showed an ejection fraction of 30-35% with global LV 

dysfunction.  There was mild aortic stenosis.





On today's evaluation of 04/19/2023, the patient remains neurologically impaired

and and anoxic encephalopathy.  The patient is post cardiac arrest.  The patient

is currently intubated on a mechanical ventilator.  Propofol is running at 20 

mcg/kg/m.  This is to maintain synchrony with a mechanical ventilator.  The 

patient is receiving daily sedation holidays.  She remains on a mechanical 

ventilator.  This morning, she is on assist-control mode at the rate of 20, 

tidal volume of 375, FiO2 of 50% with a PEEP of 5.  The blood gas from today 

shows a pH of 7.39 with a pCO2 of 36 and the pO2 is currently at 87.  The chest 

x-ray from today shows elevation of the right hemidiaphragm, a pacemaker is 

still present in the left anterior chest.  No significant airspace disease or 

pulmonary infiltrates.  Orogastric tube is in a good location.  Meanwhile, the 

patient carries a WBC count of 5.8 with a hemoglobin of 8.6 and a platelet count

of 151.  BUN is 18 with a creatinine of 0.5 and a sodium level is at 146.  UA is

negative.  Give a flight has been contacted.  As part of their workup, a CAT 

scan of the chest abdomen and pelvis was done.  There is left-sided rib 

fractures probably related to CPR.  No acute intra-abdominal abnormalities.  

Some consolidation changes in the lung bases are seen probably related to atel

ectasis.  Unfortunately, neurologically, the patient continues to be 

significantly impaired.  No reasonable neurological recovery while off propofol.

 No seizure activity and the patient remains on Keppra.  The patient is also 

receiving enteral feeding for nutritional support and she is currently on vital 

AF.  The patient remains on the hypertonic saline per neurology's 

recommendation.  The patient remains on Keppra.  The patient is on IV Zosyn.  

Her anticoagulation has been resumed she remains on Eliquis 5 mg by mouth twice 

a day.





Objective





- Vital Signs


Vital signs: 


                                   Vital Signs











Temp  98.8 F   04/19/23 08:00


 


Pulse  70   04/19/23 08:00


 


Resp  20   04/19/23 08:00


 


BP  109/43   04/19/23 08:00


 


Pulse Ox  98   04/19/23 08:00


 


FiO2  50   04/19/23 08:00








                                 Intake & Output











 04/18/23 04/19/23 04/19/23





 18:59 06:59 18:59


 


Intake Total 1427.269 868.175 100


 


Output Total 635 435 70


 


Balance 792.269 433.175 30


 


Weight  88 kg 


 


Intake:   


 


   275 50


 


    Sodium Chloride 3%( 325 275 50





    Hypertonic) 500 ml @ 25   





    mls/hr IV .Q20H MIKHAIL Rx#:   





    [id number]   


 


    levETIRAcetam IV 1,500 mg 100  





    In Saline 1 100ml.bag @   





    400 mls/hr IVPB Q12HR MIKHAIL   





    Rx#:[id number]   


 


  Intake, IV Titration 342.269 43.175 





  Amount   


 


    ACETAMINOPHEN IV (For   





    ) 1,000 mg In Empty Bag 1   





    bag @ 400 mls/hr IVPB   





    ONCE STA Rx#:[id number]   


 


    Piperacillin-Tazobactam 3 100  





    .375 gm In Sodium   





    Chloride 0.9% 100 ml @ 25   





    mls/hr IVPB Q8H MIKHAIL Rx#:   





    [id number]   


 


    propofoL 1,000 mg In 142.269 43.175 





    Empty Bag 1 bag @ 15 MCG/   





    KG/MIN 7.348 mls/hr IV .   





    [id number] [facility] Rx#:[id number]   


 


  Tube Feeding 630 550 50


 


  Other 30  


 


Output:   


 


  Urine 635 435 70


 


Other:   


 


  Voiding Method Indwelling Catheter Indwelling Catheter 


 


  # Bowel Movements 1  








                       ABP, PAP, CO, CI - Last Documented











Arterial Blood Pressure        120/49

















- Exam








No acute distress, sedated on propofol, with an orally placed endotracheal tube.

 The patient is currently on propofol.  The patient is quite symptomatic since a

mechanical ventilator.





HEENT examination is grossly unremarkable.  





Neck supple.  Full range of motion.  No adenopathy thyromegaly or neck vein 

distention.





Cardiovascular examination reveals regular rhythm rate.  S1-S2 normal.  No S3 or

S4.  No discernible murmur noted.   





Lungs reveal scattered rhonchi.  No wheezes or crackles.  Breath sounds equal.  





Abdomen obese, without bowel sounds.





Extremities are intact.  No cyanosis clubbing or edema.





Skin is without rash or lesion.





Neurologic examination the patient is currently on propofol.  The patient senses

the pain probably more so a reflex.  No facial asymmetry.  Her gaze is up 4 and 

more so to the left.  No nystagmus.  The patient is breathing above the 

mechanical ventilator.  The patient has a positive cough and a gag reflex.  The 

patient has a slow pupillary response.  The reflexes are equal and symmetrical 

and diminished bilaterally.  Negative clonus, negative Babinski's.  Neurologic 

exam is unchanged..  The patient continues to have respiratory drive.  She has a

very weak corneal reflex.








- Labs


CBC & Chem 7: 


                                 04/19/23 05:10





                                 04/19/23 05:10


Labs: 


                  Abnormal Lab Results - Last 24 Hours (Table)











  04/18/23 04/18/23 04/18/23 Range/Units





  11:40 18:00 23:55 


 


RBC     (3.80-5.40)  m/uL


 


Hgb     (11.4-16.0)  gm/dL


 


Hct     (34.0-46.0)  %


 


Lymphocytes #     (1.0-4.8)  k/uL


 


D-Dimer     (<0.60)  mg/L FEU


 


Sodium     (137-145)  mmol/L


 


Chloride     ()  mmol/L


 


BUN     (7-17)  mg/dL


 


Creatinine     (0.52-1.04)  mg/dL


 


Glucose     (74-99)  mg/dL


 


POC Glucose (mg/dL)  139 H  165 H  146 H  ()  mg/dL


 


Calcium     (8.4-10.2)  mg/dL


 


AST     (14-36)  U/L


 


Total Protein     (6.3-8.2)  g/dL


 


Albumin     (3.5-5.0)  g/dL


 


Urine Protein     (Negative)  


 


Urine Glucose (UA)     (Negative)  


 


Urine Blood     (Negative)  


 


Urine Bacteria     (None)  /hpf


 


Urine Mucus     (None)  /hpf














  04/19/23 04/19/23 04/19/23 Range/Units





  00:01 01:55 01:55 


 


RBC   2.81 L   (3.80-5.40)  m/uL


 


Hgb   9.1 L   (11.4-16.0)  gm/dL


 


Hct   27.8 L   (34.0-46.0)  %


 


Lymphocytes #   0.6 L   (1.0-4.8)  k/uL


 


D-Dimer    2.67 H  (<0.60)  mg/L FEU


 


Sodium     (137-145)  mmol/L


 


Chloride  120 H    ()  mmol/L


 


BUN  18 H    (7-17)  mg/dL


 


Creatinine  0.50 L    (0.52-1.04)  mg/dL


 


Glucose  170 H    (74-99)  mg/dL


 


POC Glucose (mg/dL)     ()  mg/dL


 


Calcium  7.5 L    (8.4-10.2)  mg/dL


 


AST  90 H    (14-36)  U/L


 


Total Protein  5.4 L    (6.3-8.2)  g/dL


 


Albumin  2.8 L    (3.5-5.0)  g/dL


 


Urine Protein     (Negative)  


 


Urine Glucose (UA)     (Negative)  


 


Urine Blood     (Negative)  


 


Urine Bacteria     (None)  /hpf


 


Urine Mucus     (None)  /hpf














  04/19/23 04/19/23 04/19/23 Range/Units





  05:10 05:10 05:20 


 


RBC  2.71 L    (3.80-5.40)  m/uL


 


Hgb  8.6 L    (11.4-16.0)  gm/dL


 


Hct  26.9 L    (34.0-46.0)  %


 


Lymphocytes #  0.3 L    (1.0-4.8)  k/uL


 


D-Dimer     (<0.60)  mg/L FEU


 


Sodium   146 H   (137-145)  mmol/L


 


Chloride   119 H   ()  mmol/L


 


BUN   18 H   (7-17)  mg/dL


 


Creatinine   0.50 L   (0.52-1.04)  mg/dL


 


Glucose   177 H   (74-99)  mg/dL


 


POC Glucose (mg/dL)     ()  mg/dL


 


Calcium   7.6 L   (8.4-10.2)  mg/dL


 


AST     (14-36)  U/L


 


Total Protein     (6.3-8.2)  g/dL


 


Albumin     (3.5-5.0)  g/dL


 


Urine Protein    Trace H  (Negative)  


 


Urine Glucose (UA)    Trace H  (Negative)  


 


Urine Blood    Small H  (Negative)  


 


Urine Bacteria    Rare H  (None)  /hpf


 


Urine Mucus    Rare H  (None)  /hpf














  04/19/23 Range/Units





  06:00 


 


RBC   (3.80-5.40)  m/uL


 


Hgb   (11.4-16.0)  gm/dL


 


Hct   (34.0-46.0)  %


 


Lymphocytes #   (1.0-4.8)  k/uL


 


D-Dimer   (<0.60)  mg/L FEU


 


Sodium   (137-145)  mmol/L


 


Chloride   ()  mmol/L


 


BUN   (7-17)  mg/dL


 


Creatinine   (0.52-1.04)  mg/dL


 


Glucose   (74-99)  mg/dL


 


POC Glucose (mg/dL)  160 H  ()  mg/dL


 


Calcium   (8.4-10.2)  mg/dL


 


AST   (14-36)  U/L


 


Total Protein   (6.3-8.2)  g/dL


 


Albumin   (3.5-5.0)  g/dL


 


Urine Protein   (Negative)  


 


Urine Glucose (UA)   (Negative)  


 


Urine Blood   (Negative)  


 


Urine Bacteria   (None)  /hpf


 


Urine Mucus   (None)  /hpf








                      Microbiology - Last 24 Hours (Table)











 04/15/23 13:34 Blood Culture - Preliminary





 Blood    No Growth after 72 hours


 


 04/15/23 13:34 Blood Culture - Preliminary





 Blood    No Growth after 72 hours


 


 04/15/23 11:19 Gram Stain - Final





 Sputum Sputum Culture - Final














Assessment and Plan


Plan: 








Status post out-of-hospital cardiopulmonary arrest, with at least 15 minutes of 

downtime, before cardiopulmonary resuscitation and return of spontaneous 

circulation, were accomplished.





Status post intubation and mechanical ventilation for cardiopulmonary arrest, 04

/15/2023.





anoxic brain injury, post cardiac arrest, currently unresponsive.  The patient 

does have brainstem reflexes.  Nevertheless, unresponsive once off sedation.  

Neurologically, the patient shows signs of severe neurologic impairment.  She 

continues to have brainstem reflexes.  The patient continues to be on hypertonic

saline.  No change in her neurologic functions on today's evaluation.





Acute fever post cardiac arrest, the patient is normothermic for now.  The 

patient responded to our hypothermia measures and she is currently afebrile.





Multifocal bilateral pulmonary infiltrates, consider aspiration and the patient 

is currently on IV Zosyn





Systolic heart failure with an ejection fraction of 30-35% and impaired LV.  

Noted the troponins were not elevated





Nondisplaced rib fractures related to CPR, as evidenced on the chest x-ray and 

the CAT scan of the chest





Pacemaker insertion with a paced cardiac rhythm for now





History of atrial fibrillation.  The current cardiac rhythmand the patient is on

anticoagulation with Eliquis





Status post AICD placement.





History of COPD.





History of hypertension.





History of CVA.





Prior history of [place] filter placement.





History of anxiety/depression.





Enteral feeding for nutritional support with vital high-protein at the rate of 

35 mL an hour





Plan


Essentially no major change in the patient's condition.  Cardiovascularly 

stable.  Neurologically impaired and anoxic encephalopathy.  Brainstem reflex 

are present.


Continue ventilator support, no changes for today


Continue IV Zosyn


Temperature normalized


Keep the patient on sedation for now and will give a sedation holiday the later 

stage less his underlying mental status


Neurology follow-up regarding her anoxic encephalopathy


Continue Keppra


Continue hypertonic saline


EEG and CAT scan of the brain were noted


Continue anticoagulation with Eliquis


Continue metoprolol 25 mg by mouth twice a day


The pacemaker needs to be evaluated and this will be coordinated with cardiology


Poor prognosis based on the above-mentioned comorbidities


We'll continue to follow.  Physical.  Evaluation was done in more than 30 

minutes.


Possible withdrawal of care and the family is considering that.  [facility] is

on the case.





Time with Patient: Greater than 30

## 2023-04-20 LAB
ALBUMIN SERPL-MCNC: 2.8 G/DL (ref 3.5–5)
ALBUMIN SERPL-MCNC: 2.8 G/DL (ref 3.5–5)
ALBUMIN SERPL-MCNC: 3.1 G/DL (ref 3.5–5)
ALP SERPL-CCNC: 51 U/L (ref 38–126)
ALP SERPL-CCNC: 52 U/L (ref 38–126)
ALP SERPL-CCNC: 57 U/L (ref 38–126)
ALT SERPL-CCNC: 29 U/L (ref 4–34)
ALT SERPL-CCNC: 29 U/L (ref 4–34)
ALT SERPL-CCNC: 30 U/L (ref 4–34)
ANION GAP SERPL CALC-SCNC: 0 MMOL/L
ANION GAP SERPL CALC-SCNC: 3 MMOL/L
ANION GAP SERPL CALC-SCNC: 4 MMOL/L
ANION GAP SERPL CALC-SCNC: 5 MMOL/L
APTT BLD: 22.3 SEC (ref 22–30)
APTT BLD: 22.3 SEC (ref 22–30)
AST SERPL-CCNC: 83 U/L (ref 14–36)
AST SERPL-CCNC: 83 U/L (ref 14–36)
AST SERPL-CCNC: 90 U/L (ref 14–36)
BASOPHILS # BLD AUTO: 0 K/UL (ref 0–0.2)
BASOPHILS # BLD AUTO: 0 K/UL (ref 0–0.2)
BASOPHILS NFR BLD AUTO: 0 %
BASOPHILS NFR BLD AUTO: 0 %
BUN SERPL-SCNC: 21 MG/DL (ref 7–17)
BUN SERPL-SCNC: 22 MG/DL (ref 7–17)
CALCIUM SPEC-MCNC: 7.6 MG/DL (ref 8.4–10.2)
CALCIUM SPEC-MCNC: 7.8 MG/DL (ref 8.4–10.2)
CALCIUM SPEC-MCNC: 7.9 MG/DL (ref 8.4–10.2)
CALCIUM SPEC-MCNC: 8 MG/DL (ref 8.4–10.2)
CHLORIDE SERPL-SCNC: 118 MMOL/L (ref 98–107)
CHLORIDE SERPL-SCNC: 120 MMOL/L (ref 98–107)
CO2 BLDA-SCNC: 26 MMOL/L (ref 19–24)
CO2 SERPL-SCNC: 24 MMOL/L (ref 22–30)
CO2 SERPL-SCNC: 24 MMOL/L (ref 22–30)
CO2 SERPL-SCNC: 25 MMOL/L (ref 22–30)
CO2 SERPL-SCNC: 27 MMOL/L (ref 22–30)
EOSINOPHIL # BLD AUTO: 0 K/UL (ref 0–0.7)
EOSINOPHIL # BLD AUTO: 0 K/UL (ref 0–0.7)
EOSINOPHIL NFR BLD AUTO: 0 %
EOSINOPHIL NFR BLD AUTO: 1 %
ERYTHROCYTE [DISTWIDTH] IN BLOOD BY AUTOMATED COUNT: 2.65 M/UL (ref 3.8–5.4)
ERYTHROCYTE [DISTWIDTH] IN BLOOD BY AUTOMATED COUNT: 3.02 M/UL (ref 3.8–5.4)
ERYTHROCYTE [DISTWIDTH] IN BLOOD: 14.7 % (ref 11.5–15.5)
ERYTHROCYTE [DISTWIDTH] IN BLOOD: 14.7 % (ref 11.5–15.5)
GLUCOSE BLD-MCNC: 160 MG/DL (ref 70–110)
GLUCOSE BLD-MCNC: 180 MG/DL (ref 70–110)
GLUCOSE BLD-MCNC: 221 MG/DL (ref 70–110)
GLUCOSE SERPL-MCNC: 177 MG/DL (ref 74–99)
GLUCOSE SERPL-MCNC: 178 MG/DL (ref 74–99)
GLUCOSE SERPL-MCNC: 182 MG/DL (ref 74–99)
GLUCOSE SERPL-MCNC: 184 MG/DL (ref 74–99)
HCO3 BLDA-SCNC: 24 MMOL/L (ref 21–25)
HCT VFR BLD AUTO: 26.6 % (ref 34–46)
HCT VFR BLD AUTO: 30.3 % (ref 34–46)
HGB BLD-MCNC: 8.6 GM/DL (ref 11.4–16)
HGB BLD-MCNC: 9.6 GM/DL (ref 11.4–16)
INR PPP: 0.9 (ref ?–1.2)
INR PPP: 1 (ref ?–1.2)
LYMPHOCYTES # SPEC AUTO: 0.4 K/UL (ref 1–4.8)
LYMPHOCYTES # SPEC AUTO: 1 K/UL (ref 1–4.8)
LYMPHOCYTES NFR SPEC AUTO: 8 %
LYMPHOCYTES NFR SPEC AUTO: 9 %
MAGNESIUM SPEC-SCNC: 2.4 MG/DL (ref 1.6–2.3)
MCH RBC QN AUTO: 31.9 PG (ref 25–35)
MCH RBC QN AUTO: 32.3 PG (ref 25–35)
MCHC RBC AUTO-ENTMCNC: 31.7 G/DL (ref 31–37)
MCHC RBC AUTO-ENTMCNC: 32.1 G/DL (ref 31–37)
MCV RBC AUTO: 100.5 FL (ref 80–100)
MCV RBC AUTO: 100.6 FL (ref 80–100)
MONOCYTES # BLD AUTO: 0.3 K/UL (ref 0–1)
MONOCYTES # BLD AUTO: 0.5 K/UL (ref 0–1)
MONOCYTES NFR BLD AUTO: 5 %
MONOCYTES NFR BLD AUTO: 6 %
NEUTROPHILS # BLD AUTO: 3.8 K/UL (ref 1.3–7.7)
NEUTROPHILS # BLD AUTO: 9.8 K/UL (ref 1.3–7.7)
NEUTROPHILS NFR BLD AUTO: 85 %
NEUTROPHILS NFR BLD AUTO: 85 %
PCO2 BLDA: 39 MMHG (ref 35–45)
PH BLDA: 7.4 [PH] (ref 7.35–7.45)
PH UR: 6 [PH] (ref 5–8)
PHENYTOIN SERPL-MCNC: 17.8 UG/ML
PLATELET # BLD AUTO: 153 K/UL (ref 150–450)
PLATELET # BLD AUTO: 240 K/UL (ref 150–450)
PO2 BLDA: 91 MMHG (ref 83–108)
POTASSIUM SERPL-SCNC: 3.9 MMOL/L (ref 3.5–5.1)
POTASSIUM SERPL-SCNC: 4.1 MMOL/L (ref 3.5–5.1)
POTASSIUM SERPL-SCNC: 4.2 MMOL/L (ref 3.5–5.1)
POTASSIUM SERPL-SCNC: 4.7 MMOL/L (ref 3.5–5.1)
PROT SERPL-MCNC: 5.3 G/DL (ref 6.3–8.2)
PROT SERPL-MCNC: 5.3 G/DL (ref 6.3–8.2)
PROT SERPL-MCNC: 5.9 G/DL (ref 6.3–8.2)
PROT UR QL: (no result)
PT BLD: 10.1 SEC (ref 9–12)
PT BLD: 10.3 SEC (ref 9–12)
RBC UR QL: 1 /HPF (ref 0–5)
SODIUM SERPL-SCNC: 144 MMOL/L (ref 137–145)
SODIUM SERPL-SCNC: 146 MMOL/L (ref 137–145)
SODIUM SERPL-SCNC: 146 MMOL/L (ref 137–145)
SODIUM SERPL-SCNC: 150 MMOL/L (ref 137–145)
SP GR UR: 1.03 (ref 1–1.03)
SQUAMOUS UR QL AUTO: 2 /HPF (ref 0–4)
UROBILINOGEN UR QL STRIP: <2 MG/DL (ref ?–2)
WBC # BLD AUTO: 11.5 K/UL (ref 3.8–10.6)
WBC # BLD AUTO: 4.5 K/UL (ref 3.8–10.6)
WBC #/AREA URNS HPF: 1 /HPF (ref 0–5)

## 2023-04-20 RX ADMIN — IPRATROPIUM BROMIDE AND ALBUTEROL SULFATE SCH ML: .5; 3 SOLUTION RESPIRATORY (INHALATION) at 11:16

## 2023-04-20 RX ADMIN — PIPERACILLIN AND TAZOBACTAM SCH MLS/HR: 3; .375 INJECTION, POWDER, FOR SOLUTION INTRAVENOUS at 18:03

## 2023-04-20 RX ADMIN — PIPERACILLIN AND TAZOBACTAM SCH MLS/HR: 3; .375 INJECTION, POWDER, FOR SOLUTION INTRAVENOUS at 10:19

## 2023-04-20 RX ADMIN — APIXABAN SCH MG: 5 TABLET, FILM COATED ORAL at 20:29

## 2023-04-20 RX ADMIN — LEVETIRACETAM SCH MLS/HR: 15 INJECTION INTRAVENOUS at 20:29

## 2023-04-20 RX ADMIN — INSULIN ASPART SCH UNIT: 100 INJECTION, SOLUTION INTRAVENOUS; SUBCUTANEOUS at 18:03

## 2023-04-20 RX ADMIN — CHLORHEXIDINE GLUCONATE SCH ML: 1.2 RINSE ORAL at 08:01

## 2023-04-20 RX ADMIN — APIXABAN SCH MG: 5 TABLET, FILM COATED ORAL at 08:01

## 2023-04-20 RX ADMIN — ATORVASTATIN CALCIUM SCH MG: 80 TABLET, FILM COATED ORAL at 08:01

## 2023-04-20 RX ADMIN — IPRATROPIUM BROMIDE AND ALBUTEROL SULFATE SCH ML: .5; 3 SOLUTION RESPIRATORY (INHALATION) at 21:23

## 2023-04-20 RX ADMIN — METOPROLOL TARTRATE SCH: 25 TABLET, FILM COATED ORAL at 20:29

## 2023-04-20 RX ADMIN — CHLORHEXIDINE GLUCONATE SCH ML: 1.2 RINSE ORAL at 20:29

## 2023-04-20 RX ADMIN — IPRATROPIUM BROMIDE AND ALBUTEROL SULFATE SCH ML: .5; 3 SOLUTION RESPIRATORY (INHALATION) at 01:07

## 2023-04-20 RX ADMIN — NOREPINEPHRINE BITARTRATE SCH MLS/HR: 1 INJECTION, SOLUTION, CONCENTRATE INTRAVENOUS at 21:28

## 2023-04-20 RX ADMIN — PIPERACILLIN AND TAZOBACTAM SCH MLS/HR: 3; .375 INJECTION, POWDER, FOR SOLUTION INTRAVENOUS at 02:04

## 2023-04-20 RX ADMIN — IPRATROPIUM BROMIDE AND ALBUTEROL SULFATE SCH ML: .5; 3 SOLUTION RESPIRATORY (INHALATION) at 03:40

## 2023-04-20 RX ADMIN — METOPROLOL TARTRATE SCH MG: 25 TABLET, FILM COATED ORAL at 08:01

## 2023-04-20 RX ADMIN — ACETAMINOPHEN PRN MG: 325 TABLET, FILM COATED ORAL at 18:03

## 2023-04-20 RX ADMIN — LEVETIRACETAM SCH MLS/HR: 15 INJECTION INTRAVENOUS at 08:01

## 2023-04-20 RX ADMIN — POTASSIUM CHLORIDE SCH: 14.9 INJECTION, SOLUTION INTRAVENOUS at 15:11

## 2023-04-20 RX ADMIN — PHENYTOIN SODIUM SCH MLS/HR: 50 INJECTION INTRAMUSCULAR; INTRAVENOUS at 10:10

## 2023-04-20 RX ADMIN — PHENYTOIN SODIUM SCH MLS/HR: 50 INJECTION INTRAMUSCULAR; INTRAVENOUS at 21:05

## 2023-04-20 RX ADMIN — NOREPINEPHRINE BITARTRATE SCH: 1 INJECTION, SOLUTION, CONCENTRATE INTRAVENOUS at 08:58

## 2023-04-20 RX ADMIN — INSULIN ASPART SCH UNIT: 100 INJECTION, SOLUTION INTRAVENOUS; SUBCUTANEOUS at 05:48

## 2023-04-20 RX ADMIN — INSULIN ASPART SCH UNIT: 100 INJECTION, SOLUTION INTRAVENOUS; SUBCUTANEOUS at 12:42

## 2023-04-20 RX ADMIN — PANTOPRAZOLE SODIUM SCH MG: 40 INJECTION, POWDER, FOR SOLUTION INTRAVENOUS at 08:01

## 2023-04-20 RX ADMIN — IPRATROPIUM BROMIDE AND ALBUTEROL SULFATE SCH ML: .5; 3 SOLUTION RESPIRATORY (INHALATION) at 08:07

## 2023-04-20 RX ADMIN — IPRATROPIUM BROMIDE AND ALBUTEROL SULFATE SCH ML: .5; 3 SOLUTION RESPIRATORY (INHALATION) at 15:22

## 2023-04-20 NOTE — EEG
ELECTROENCEPHALOGRAM REPORT



PREAMBLE:

This is a 67-year-old female with cardiac arrest.  The patient has some rhythmic
eye

blinking, need to rule out partial status.  Patient is comatose.



EEG FINDINGS:

This is a 21-channel digital EEG recorded with video component, utilizing 10/20

international system with referential and bipolar montages.  Background consists
of

severely suppressed, almost isoelectric appearing EEG with no discernible

electrocerebral activity seen in bihemispheric region.  Some myogenic artifact 
was seen

in the left frontal and temporal and the right temporal region intermittently 
during

the study.  In the middle and later part of the study, frequent eye blink 
artifacts

were seen, but there was no associated any epileptiform activity seen with the 
eye

blink artifact.  No focal or generalized epileptiform activity was seen.



IMPRESSION:

This is a very severely abnormal EEG due to a severely suppressed background in

bihemispheric region, consistent with diffuse, global encephalopathy, as can be 
seen

with anoxic encephalopathy.  Some eye blink artifacts were seen, but there was 
no

associated epileptiform activity seen.  No electrographic evidence of status

epilepticus.





MMODL / IJN: [id number] / Job#: [id number]

[facility]

## 2023-04-20 NOTE — XR
EXAMINATION TYPE: XR chest 1V portable

 

DATE OF EXAM: 4/20/2023 4:54 AM

 

COMPARISON: Chest radiographs from 4/19/2023, CT chest abdomen pelvis 4/19/2023.

 

TECHNIQUE: XR chest 1V portable Portable AP radiograph of the chest.

 

CLINICAL INDICATION:Female, 67 years old with history of Tube placement; 

 

FINDINGS: 

Lungs/Pleura: Low lung volumes with small bilateral pleural effusions and no pneumothorax.

Pulmonary vascularity: Unremarkable.

Heart/mediastinum: Cardiomediastinal silhouette is enlarged and stable. Three lead cardiac conduction
 device overlying the left hemithorax with lead tips projecting over the right ventricle, right atriu
m and coronary sinus.

Musculoskeletal: Bilateral rib fractures redemonstrated. Partial visualization of left shoulder prost
hesis. 

 

Other findings: None

 

Lines/Tubes:

Endotracheal tube with distal tip 1.9 cm above the guzman

Nasogastric tube with its distal tip and side-port projecting under the diaphragm.

 

 

IMPRESSION: 

 

1. Stable support tubes.

2. Low lung volumes with small bilateral pleural effusions.

## 2023-04-20 NOTE — P.PN
Subjective


Progress Note Date: 04/20/23








67-year-old female who apparently had a cardiopulmonary arrest, at a local 

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  The patient apparently had a downtime of about 15 

minutes, for there was cardiopulmonary resuscitation and return of spontaneous 

circulation.  The patient was seen in the ER, by the ER physician, Dr. Pennington.  

The patient was vented, and a right femoral vein triple lumen catheter was 

placed.  Family members are in the room, we selected see the patient.  The 

patient herself is unresponsive.  She has a orally placed endotracheal tube.  

She's currently on the ventilator, with vent settings of volume assist control, 

rate 20, tidal volume 375, FiO2 100%, 5.  Blood gases show pO2 75, pCO2 of 53, 

and a pH is 7.17.  The patient is on norepinephrine at 0.15 mcg/kg/m, and 

propofol at 15 mcg/kg/m.  The patient has a history of COPD from secondhand 

tobacco exposure, a previous history of the fibrillator placement for 

cardiomyopathy, and history of stroke, and [place] filter placement.  In 

addition, the patient is on home O2, that she is supposed to use all the time, 

but she only uses as needed.  She was a smoker in the distant past.  White count

9.7, hemoglobin 11.4, hematocrit 37.9, and platelet count was normal.  D-dimer 

was 3.10.  Sodium 137, potassium 4.3, chlorides 104, CO2 17, anion gap 16, BUN 

and creatinine 12 and 0.76.  Troponin was 0.022 and N-terminal proBNP was 2290. 

Lactate was not measured but is probably elevated.  Testing for influenza A, and

B, RSV, and coronavirus are all negative.  Chest x-ray shows evidence of 

cardiomegaly, fluid overload, and an appropriately placed endotracheal tube.  

Computed tomography scan of the brain showed nothing acute.  CT angiogram was 

negative for PE.  There also may be some right lower lobe consolidation 

consistent with prior aspiration.





Progress note dated 04/16/2023.





67-year-old female who had an out-of-hospital cardiopulmonary arrest.  The 

patient had about 15 minutes of resuscitation before there was return of 

spontaneous circulation.  Unfortunately, the patient may have sustained anoxic 

brain injury.  Today, we place an art line.  She remains on the ventilator.  I 

did have neurology see her.  She is on volume assist control, rate 20, tidal 

volume 375, FiO2 60%, PEEP of 5.  Arterial blood gases show pO2 of 90, pCO2 34, 

and pH is 7.35.  The patient is on norepinephrine at 0.05 mcg/kg/m, propofol at 

40 mcg/kg/m, and saline at 75 mL an hour.  We will discontinue the Levaquin and 

Flagyl and start Zosyn.  In addition we'll start some tube feeds, at 10 mL an 

hour.  A right radial art line will be placed today.  White count 8.9, 

hemoglobin 10.8, hematocrit 34.1, and platelet count is normal.  Sodium 139, 

potassium 3.9, chlorides 112, CO2 18, BUN 19, creatinine 0.71.  Troponins were 

0.086 and 0.091.  Chest x-ray shows persistent bilateral infiltrates, more so in

the right lung than on the left.  This may relate to aspiration.








On today's evaluation of 04/18/2023, seeing the patient for a follow-up.  This 

is a case of a cardiac arrest with at least 15 minutes downtime.  The patient 

has an AICD in place.  Initial cardiac rhythm is not clear.  The did not have 

any pacemaker discharges and the pacemaker has not been interrogated yet.  Noted

the patient has a pacer AICD in place.  Her current cardiac rhythm is paced and 

her rate is currently at 76.  Hemodynamically, the patient is stable on no 

pressors.  The patient is maintaining her own blood pressure.  Meanwhile, the 

patient's troponins did not rise and the troponin peaked at 0.09.  The patient 

currently has signs of anoxic encephalopathy.  Attempts to wean her off the 

sedation yesterday feel that the patient became quite hypertensive and 

tachypneic and she did not show any reasonable neurological recovery.  Neurology

is on the case for the potential anoxic encephalopathy.  CAT scan of the brain 

was done on 04/15/2023 and 04/17/2023 and there is mild diffuse cerebral atrophy

without any acute brain changes.  There is bilateral sphenoid sinus disease.  

EEG was also done on 04/16/2023 indicating diffuse suppression due to anoxia.  

The patient is currently on propofol which is running at the rate of 15 

mcg/kg/m.  The patient is also on IV Keppra 1.5 g every 12 hours.  This morning,

the patient remains on a mechanical ventilator patient is on assist control mode

at the rate of 20, tidal volume of 375, FiO2 of 50% with a PEEP of 5.  The blood

gas shows a pH of 7.42 with a pCO2 of 34 and pO2 112.  Sodium is at 141 with a 

potassium level of 4.4, BUN is at 17 with a creatinine of 0.59.  WBC count at 

7.2 with a hemoglobin 9.3 and a platelet count of 163.  The chest x-ray from 

this morning is showing cardiomegaly along with bilateral pleural effusion.  ET 

tube is in a good location.  The patient also has a orogastric tube in place.  A

CT angiogram of the chest that was done on 04/15/2023 showed bilateral lower 

lobe and upper lobe consolidation and scattered areas of multifocal groundglass 

opacities throughout the lung.  Possibility of aspiration was considered in 

addition to underlying cardiomegaly and multiple acute minimally displaced 

anterior rib fractures in the setting of CPR.  IV fluids are currently at the 

rate of 25 mL an hour of 3% hypertonic saline.  This was initiated by neurology 

regarding the potential of CNS swelling.  As mentioned, the sodium level is at 

141 from this morning.  No seizure activity has been noted.Patient is currently 

on antibiotic without liquids 5 mg by mouth twice a day.  The patient is on met

oprolol 25 mg by mouth twice a day.  The patient is on Zestril 5 mg by mouth 

twice a day.  The patient is also on empiric antibiotic coverage with IV Zosyn. 

The echocardiogram showed an ejection fraction of 30-35% with global LV 

dysfunction.  There was mild aortic stenosis.





On today's evaluation of 04/19/2023, the patient remains neurologically impaired

and and anoxic encephalopathy.  The patient is post cardiac arrest.  The patient

is currently intubated on a mechanical ventilator.  Propofol is running at 20 

mcg/kg/m.  This is to maintain synchrony with a mechanical ventilator.  The 

patient is receiving daily sedation holidays.  She remains on a mechanical 

ventilator.  This morning, she is on assist-control mode at the rate of 20, 

tidal volume of 375, FiO2 of 50% with a PEEP of 5.  The blood gas from today 

shows a pH of 7.39 with a pCO2 of 36 and the pO2 is currently at 87.  The chest 

x-ray from today shows elevation of the right hemidiaphragm, a pacemaker is 

still present in the left anterior chest.  No significant airspace disease or 

pulmonary infiltrates.  Orogastric tube is in a good location.  Meanwhile, the 

patient carries a WBC count of 5.8 with a hemoglobin of 8.6 and a platelet count

of 151.  BUN is 18 with a creatinine of 0.5 and a sodium level is at 146.  UA is

negative.  Give a flight has been contacted.  As part of their workup, a CAT 

scan of the chest abdomen and pelvis was done.  There is left-sided rib 

fractures probably related to CPR.  No acute intra-abdominal abnormalities.  

Some consolidation changes in the lung bases are seen probably related to atel

ectasis.  Unfortunately, neurologically, the patient continues to be 

significantly impaired.  No reasonable neurological recovery while off propofol.

 No seizure activity and the patient remains on Keppra.  The patient is also 

receiving enteral feeding for nutritional support and she is currently on vital 

AF.  The patient remains on the hypertonic saline per neurology's 

recommendation.  The patient remains on Keppra.  The patient is on IV Zosyn.  

Her anticoagulation has been resumed she remains on Eliquis 5 mg by mouth twice 

a day.





On today's evaluation of 04/20/2023, the patient is intubated on a mechanical 

ventilator, she remains on propofol running at 10 mcg/kg/m.  Deeply comatose and

unresponsive.  Earlier this morning, twitching of the eyes was also noted and 

she is a possible undergoing an EEG today.  As mentioned, she is a case of 

severe anoxic encephalopathy post cardiac arrest.  The patient is being 

considered for gift of life.  Family has consented.  She remains on IV Keppra 

and Dilantin.  Dilantin was started yesterday by neurology..  She was on 

hypertonic saline and this was discontinued by neurology.  Meanwhile, the 

patient remains on a mechanical ventilator, assist control mode with a rate of 

20, tidal volume of 375, FiO2 of 50% with a PEEP of 5.  Chest x-ray from today 

shows left basilar atelectasis.  Orotracheal tube is in a good location.  The 

patient has a pacer/AICD over the left anterior chest area.  No significant 

abnormalities noted on today's chest x-ray.  The blood gas from today shows a pH

of 7.4 with a pCO2 of 39 and pO2 of 91.  She is afebrile.  She was having back 

and forth fever and this morning she is afebrile.  The electrolytes are all norm

al.  Sodium is at 144 with a BUN of 22 and a creatinine of 0.4.  Blood sugars at

182.  LFTs are normal.  UA is negative.  Hemodynamically stable.  No pressors 

for now.  Cardiac rhythm is sinus.  She is receiving enteral feeding for 

nutritional support and she is currently on vital high-protein at the rate of 35

mL an hour.  The patient remains on IV Zosyn as an empiric antibiotic coverage. 

The patient remains on Eliquis 5 mg by mouth twice a day.





Objective





- Vital Signs


Vital signs: 


                                   Vital Signs











Temp  98.6 F   04/20/23 04:00


 


Pulse  70   04/20/23 06:00


 


Resp  24   04/20/23 06:00


 


BP  106/45   04/20/23 06:00


 


Pulse Ox  98   04/20/23 06:00


 


FiO2  50   04/20/23 04:00








                                 Intake & Output











 04/19/23 04/19/23 04/20/23





 06:59 18:59 06:59


 


Intake Total 881.787 2248.271 735.971


 


Output Total 435 435 520


 


Balance 433.175 629.271 215.971


 


Weight 88 kg 88 kg 92.2 kg


 


Intake:   


 


   225 512


 


    KVO and Pressure Bag   376


 


    Phenytoin Sodium Inj 200   36





    mg In Sodium Chloride 0.9   





    % 36 ml @ 80 mls/hr IVPB   





    Q12HR [facility] Rx#:[id number]   


 


    Sodium Chloride 3%( 275 125 





    Hypertonic) 500 ml @ 25   





    mls/hr IV .Q20H [facility] Rx#:   





    [id number]   


 


    levETIRAcetam IV 1,500 mg  100 100





    In Saline 1 100ml.bag @   





    400 mls/hr IVPB Q12HR [facility]   





    Rx#:[id number]   


 


  Intake, IV Titration 43.175 324.271 153.971





  Amount   


 


    Phenytoin Sodium Inj 1,  100 





    750 mg In Sodium Chloride   





    0.9% 100 ml @ 200 mls/hr   





    IVPB ONCE [facility] Rx#:   





    [id number]   


 


    Piperacillin-Tazobactam 3  100 100





    .375 gm In Sodium   





    Chloride 0.9% 100 ml @ 25   





    mls/hr IVPB Q8H [facility] Rx#:   





    [id number]   


 


    propofoL 1,000 mg In 43.175 124.271 53.971





    Empty Bag 1 bag @ 15 MCG/   





    KG/MIN 7.348 mls/hr IV .   





    [id number] [facility] Rx#:[id number]   


 


  Tube Feeding 550 395 70


 


  Other  120 


 


Output:   


 


  Urine 435 435 520


 


Other:   


 


  Voiding Method Indwelling Catheter Indwelling Catheter Indwelling Catheter








                       ABP, PAP, CO, CI - Last Documented











Arterial Blood Pressure        121/44

















- Exam








No acute distress, sedated on propofol, with an orally placed endotracheal tube.

 The patient is currently on propofol.  The patient is quite symptomatic since a

mechanical ventilator.





HEENT examination is grossly unremarkable.  





Neck supple.  Full range of motion.  No adenopathy thyromegaly or neck vein 

distention.





Cardiovascular examination reveals regular rhythm rate.  S1-S2 normal.  No S3 or

S4.  No discernible murmur noted.   





Lungs reveal scattered rhonchi.  No wheezes or crackles.  Breath sounds equal.  





Abdomen obese, without bowel sounds.





Extremities are intact.  No cyanosis clubbing or edema.





Skin is without rash or lesion.





Neurologic examination the patient is currently on propofol.  The patient senses

the pain probably more so a reflex.  No facial asymmetry.  Her gaze is up 4 and 

more so to the left.  No nystagmus.  The patient is breathing above the 

mechanical ventilator.  The patient has a positive cough and a gag reflex.  The 

patient has a slow pupillary response.  The reflexes are equal and symmetrical 

and diminished bilaterally.  Negative clonus, negative Babinski's.  Neurologic 

exam is unchanged..  The patient continues to have respiratory drive.  She has a

very weak corneal reflex.  The patient is having episodic twitching of the eyes.

 She is currently on a combination of Dilantin. 








- Labs


CBC & Chem 7: 


                                 04/19/23 16:00





                                 04/20/23 05:34


Labs: 


                  Abnormal Lab Results - Last 24 Hours (Table)











  04/19/23 04/19/23 04/19/23 Range/Units





  10:00 11:31 11:40 


 


RBC     (3.80-5.40)  m/uL


 


Hgb     (11.4-16.0)  gm/dL


 


Hct     (34.0-46.0)  %


 


Lymphocytes #     (1.0-4.8)  k/uL


 


ABG Total CO2     (19-24)  mmol/L


 


ABG O2 Saturation     (94-97)  %


 


Sodium  148 H   148 H  (137-145)  mmol/L


 


Chloride  121 H   120 H  ()  mmol/L


 


BUN    18 H  (7-17)  mg/dL


 


Creatinine  0.50 L   0.49 L  (0.52-1.04)  mg/dL


 


Glucose  134 H   156 H  (74-99)  mg/dL


 


POC Glucose (mg/dL)   142 H   ()  mg/dL


 


Calcium  7.7 L   7.9 L  (8.4-10.2)  mg/dL


 


Phosphorus  1.9 L   1.9 L  (2.5-4.5)  mg/dL


 


Magnesium     (1.6-2.3)  mg/dL


 


AST  85 H   90 H  (14-36)  U/L


 


Total Protein  5.4 L   5.4 L  (6.3-8.2)  g/dL


 


Albumin  2.8 L   2.8 L  (3.5-5.0)  g/dL


 


Urine Protein     (Negative)  


 


Urine Mucus     (None)  /hpf














  04/19/23 04/19/23 04/19/23 Range/Units





  15:57 16:00 22:57 


 


RBC   2.81 L   (3.80-5.40)  m/uL


 


Hgb   9.0 L   (11.4-16.0)  gm/dL


 


Hct   27.8 L   (34.0-46.0)  %


 


Lymphocytes #   0.5 L   (1.0-4.8)  k/uL


 


ABG Total CO2     (19-24)  mmol/L


 


ABG O2 Saturation     (94-97)  %


 


Sodium     (137-145)  mmol/L


 


Chloride     ()  mmol/L


 


BUN     (7-17)  mg/dL


 


Creatinine     (0.52-1.04)  mg/dL


 


Glucose     (74-99)  mg/dL


 


POC Glucose (mg/dL)  172 H   165 H  ()  mg/dL


 


Calcium     (8.4-10.2)  mg/dL


 


Phosphorus     (2.5-4.5)  mg/dL


 


Magnesium     (1.6-2.3)  mg/dL


 


AST     (14-36)  U/L


 


Total Protein     (6.3-8.2)  g/dL


 


Albumin     (3.5-5.0)  g/dL


 


Urine Protein     (Negative)  


 


Urine Mucus     (None)  /hpf














  04/20/23 04/20/23 04/20/23 Range/Units





  00:00 04:15 05:34 


 


RBC     (3.80-5.40)  m/uL


 


Hgb     (11.4-16.0)  gm/dL


 


Hct     (34.0-46.0)  %


 


Lymphocytes #     (1.0-4.8)  k/uL


 


ABG Total CO2   26 H   (19-24)  mmol/L


 


ABG O2 Saturation   97.6 H   (94-97)  %


 


Sodium  146 H    (137-145)  mmol/L


 


Chloride  118 H   120 H  ()  mmol/L


 


BUN  21 H   22 H  (7-17)  mg/dL


 


Creatinine  0.41 L   0.42 L  (0.52-1.04)  mg/dL


 


Glucose  184 H   182 H  (74-99)  mg/dL


 


POC Glucose (mg/dL)     ()  mg/dL


 


Calcium  7.6 L   7.8 L  (8.4-10.2)  mg/dL


 


Phosphorus  2.4 L   2.3 L  (2.5-4.5)  mg/dL


 


Magnesium  2.4 H   2.4 H  (1.6-2.3)  mg/dL


 


AST  83 H   83 H  (14-36)  U/L


 


Total Protein  5.3 L   5.3 L  (6.3-8.2)  g/dL


 


Albumin  2.8 L   2.8 L  (3.5-5.0)  g/dL


 


Urine Protein     (Negative)  


 


Urine Mucus     (None)  /hpf














  04/20/23 04/20/23 Range/Units





  05:34 05:44 


 


RBC    (3.80-5.40)  m/uL


 


Hgb    (11.4-16.0)  gm/dL


 


Hct    (34.0-46.0)  %


 


Lymphocytes #    (1.0-4.8)  k/uL


 


ABG Total CO2    (19-24)  mmol/L


 


ABG O2 Saturation    (94-97)  %


 


Sodium    (137-145)  mmol/L


 


Chloride    ()  mmol/L


 


BUN    (7-17)  mg/dL


 


Creatinine    (0.52-1.04)  mg/dL


 


Glucose    (74-99)  mg/dL


 


POC Glucose (mg/dL)   160 H  ()  mg/dL


 


Calcium    (8.4-10.2)  mg/dL


 


Phosphorus    (2.5-4.5)  mg/dL


 


Magnesium    (1.6-2.3)  mg/dL


 


AST    (14-36)  U/L


 


Total Protein    (6.3-8.2)  g/dL


 


Albumin    (3.5-5.0)  g/dL


 


Urine Protein  1+ H   (Negative)  


 


Urine Mucus  Rare H   (None)  /hpf








                      Microbiology - Last 24 Hours (Table)











 04/15/23 13:34 Blood Culture - Preliminary





 Blood    No Growth after 96 hours


 


 04/15/23 13:34 Blood Culture - Preliminary





 Blood    No Growth after 96 hours


 


 04/15/23 11:19 Legionella Culture - Preliminary





 Sputum 














Assessment and Plan


Plan: 








Status post out-of-hospital cardiopulmonary arrest, with at least 15 minutes of 

downtime, before cardiopulmonary resuscitation and return of spontaneous 

circulation, were accomplished.





Status post intubation and mechanical ventilation for cardiopulmonary arrest, 

04/15/2023.





anoxic brain injury, post cardiac arrest, currently unresponsive.  The patient 

does have brainstem reflexes.  Nevertheless, unresponsive once off sedation.  

Neurologically, the patient shows signs of severe neurologic impairment.  She 

continues to have brainstem reflexes.  The patient continues to have seizure-

like activity with decreasing of the eyes and the patient is currently on a 

combination of Keppra and Dilantin.  Repeat EEG to be done today.  Neurology is 

on the case.  She continues to show signs of severe anoxic encephalopathy.  She 

is on low dose of propofol.





Acute fever post cardiac arrest, the patient is normothermic for now.  The 

patient responded to our hypothermia measures and she is currently afebrile.





Multifocal bilateral pulmonary infiltrates, consider aspiration and the patient 

is currently on IV Zosyn, chest x-ray showed no acute abnormalities on today's 

evaluation.





Systolic heart failure with an ejection fraction of 30-35% and impaired LV.  

Noted the troponins were not elevated





Nondisplaced rib fractures related to CPR, as evidenced on the chest x-ray and 

the CAT scan of the chest





Pacemaker insertion with a paced cardiac rhythm for now





History of atrial fibrillation.  The current cardiac rhythmand the patient is on

anticoagulation with Eliquis





Status post AICD placement.





History of COPD.





History of hypertension.





History of CVA.





Prior history of [place] filter placement.





History of anxiety/depression.





Enteral feeding for nutritional support with vital high-protein at the rate of 

35 mL an hour





Plan


Continue vent support, no ventilator changes for today


Essentially no major change in the patient's condition.  Cardiovascularly 

stable.  Neurologically impaired and anoxic encephalopathy.  Brainstem reflex 

are present.  The patient may be having episodic seizure activity.  She is 

currently on a combination of Dilantin.  Hypertonic Saline Was Discontinued by 

Neurology.


She is afebrile this morning


Continue ventilator support, no changes for today


Continue IV Zosyn


Temperature normalized


Keep low-dose propofol for syncope with a mechanical ventilator and prevent any 

autonomic reactions


Neurology follow-up regarding her anoxic encephalopathy


EEG and CAT scan of the brain were noted


Continue anticoagulation with Eliquis


Continue metoprolol 25 mg by mouth twice a day


The pacemaker needs to be evaluated and this will be coordinated with cardiology


Poor prognosis based on the above-mentioned comorbidities


We'll continue to follow.  Physical.  Evaluation was done in more than 30 

minutes.


Possible withdrawal of care and the family is considering that.  Gift of life is

on the case.





Time with Patient: Greater than 30

## 2023-04-20 NOTE — P.PN
Subjective


Progress Note Date: 04/20/23








Patient is a 67-year-old male with a known history of chronic atrial 

fibrillation on anticoagulation with Eliquis, cardiomyopathy status post ICD 

placement, COPD on home oxygen at 4 L via nasal cannula, hypertension, 

anxiety/depression and history of femoral neck fracture s/p repair in January 2023 was brought to the hospital by EMS status postcardiac arrest.  Patient was 

at [facility] where she was found to have worsening shortness of breath and became 

unresponsive on a motorized scooter..  Patient underwent CPR for about 15 

minutes by EMS and was given 3 doses of epinephrine with return of spontaneous 

circulation and was intubated.  Patient was brought to ER for evaluation.  

Patient was unresponsive and was able to provide any history.


On arrival chest x-ray showed cardiomegaly and mild pulmonary vascular 

congestion.  Correlate with BNP for congestive heart failure.  Right basilar 

patchy airspace opacities may represent pulmonary edema versus infiltrate.  

Endotracheal tube in appropriate position.  Possible NG tube terminating in the 

mid esophagus.


CT head showed no acute intracranial process.  Paranasal sinus disease with air-

fluid level within the left maxillary sinus.  Correlate for acute sinusitis.


Chest CTA showed no evidence of PE.  Bilateral lower lobe and right upper lobe 

consolidation with a scattered multifocal groundglass opacities throughout the 

lungs.  Findings are compatible with pneumonia possibly aspiration.


Multiple acute minimally displaced rib fractures likely related to CPR in the 

setting of cardiac arrest.  Remote to subacute bilateral rib fractures also demo

nstrated.  No pneumothorax.  Cardiomegaly.


Laboratory data showed WBC 9.7 hemoglobin 11.4 platelets 246 and D-dimer 3.1


Initial ABG showed pH of 7.17 PCO2 30 and PO2 75


Sodium 135, potassium 4.3, chloride 104, bicarb is 17 BUN 12 and creatinine 0.76

and blood sugar was 325 and lactic acid 2.9 AST 57 ALT 14 alk phos 73 and 

troponin x1 negative


proBNP 2290


Influenza A B RSV and COVID-19 PCR not detected.





4/16/2023


Patient is in the MICU.  Remains mechanical ventilator.  Off pressor support.


Otherwise patient remains unresponsive.  EEG was ordered and neurology is on 

board.


2D echocardiogram showed ejection fraction 30 to 35% with anterior septal 

hypokinesis.  AICD C interrogation was done.


Cardiology neurology and pulmonary is on board.


Chest x-ray showed persistent right midlung and bibasilar multifocal acute 

infiltrates without edema.  No change from 1 day.


Patient remains on antibiotics Zosyn.  NG tube in place.





4/17/2023





Patient is seen and evaluated and follow-up in the ICU remains on mechanical 

ventilation, FiO2 is 60% with a peep of 5.  Patient is off pressor support 

although continues on as needed Ativan along with IV Keppra and propofol.  

Patient also maintained on IV antibiotics in the form of Zosyn.  Chest x-ray 

today shows stable bilateral interstitial and airspace opacification with 

bilateral small pleural effusions may relate to pulmonary edema or infectious 

etiology.  Blood cultures thus far are negative and sputum culture currently 

pending.  Patient does continue to have low-grade temps 101 early this morning. 

WBC remains within normal limits and pro-calcitonin is 0.49.  Other kidney 

functions within normal limits and blood sugars being monitored.  Per nursing 

staff attempts at weaning were performed although patient became extremely 

tachycardic and restless and not following commands and placed back on sedation.

 An EEG was done although pending at this time.  Repeat CT of the brain early 

this morning shows no acute intracranial hemorrhage or midline shift with mild 

diffuse cerebral atrophy redemonstrated with slightly worsening bilateral spheno

id acute sinusitis.  Multiple medical consultations following an per nursing 

staff [employer] was notified although unsure of family is aware.  Patient was

on the registry.





04/18/2023





Patient is seen and evaluated in the ICU maintained on mechanical ventilation 

with an FiO2 of 55% and PEEP is 5.  Patient is maintained on IV antibiotics and 

continues to have fevers and cultures thus far been negative.  Patient is 

continued on sedation and off pressor support and remains on hypertonic solution

.  Neurology following as well as pulmonary intensivist with overall poor 

prognosis.  Per nursing staff, family has been discussing possible comfort care 

although not ready to wean at this time.  Gift of life also following as patient

is on the registry evaluating the patient.





04/19/2023





Patient continues to be in the ICU on mechanical ventilation with an FiO2 of 50%

and PEEP is 5.  Multiple medical consultations including cardiology, pulmonary 

intensivist, neurology following.  Patient had a CT of the chest abdomen pelvis 

per gift of life services which confirmed bilateral rib fractures involving at 

least 2 ribs through 10 bilaterally with no evidence of acute abdominal process 

and consolidation changes in the lung bases likely secondary to atelectasis and 

hypoventilatory.  Patient is maintained on hypertonic solution along with IV 

Keppra with neurology following and patient remains clinically the same.  No 

further seizure-like activity noted although does continue with jerking and 

twitching type movements once removed from sedation.  Patient is undergoing 

occasional sedation holidays.  Gift of life is following as patient is on the 

registry although has not approached family as of yet and family is considering 

terminale weaning and comfort measures.  Cardiology signed off and will follow 

as needed.  Overall prognosis is poor as neurological status continues to be 

unchanged.





04/20/2023





Patient is seen in follow-up today continues to be on mechanical ventilation 

with an FiO2 of 50% and PEEP is 5.  Patient does continue on sedation along with

IV Keppra and empiric Zosyn and not requiring pressors.  Patient's sodium is 

elevated today at 150 and has been on hypertonic solution that was just 

discontinued and will follow-up with repeat sodium level.  Possible D5 in water.

 Gift of life is following as patient is on the registry and family is agreeable

discussing possible gift of life this Sunday as they are currently awaiting more

family members.  Repeat EEG was done today and continues to show severely 

abnormal EEG due to severely suppressed background and consistent with diffuse 

global encephalopathy. Not following any commands with sedation holidays.  Chest

x-ray today shows bilateral pleural effusions although stable. Patient is 

continuing to have fevers today.





Review of systems: Unable to obtain as patient is intubated and on sedation








PHYSICAL EXAMINATION: 


Patient is on mechanical ventilator.  FiO2 is currently 50% with a PEEP of 5


HEENT: Normocephalic. Neck is supple. Pupils reactive. Nostrils clear. Oral 

cavity is moist. 


Neck reveals no JVD, carotid bruits, or thyromegaly. 


CHEST EXAMINATION: Trachea is central. Symmetrical expansion.  Bibasilar 

diminished sounds and coarse breath sounds.  No wheezing.


CARDIAC: Normal S1, S2 with no gallops. No murmurs 


ABDOMEN: Soft. Bowel sounds present.  Nontender.  No organomegaly. No abdominal 

bruits. 


Extremities: Trace lower extremity edema.  No clubbing or cyanosis


Neurologically patient is on mechanical ventilator.  Currently on sedation


Skin: No rash or skin lesions. 


Psychiatric: Could not be assessed at this time


Musculoskeletal: No joint swelling or deformity.





Assessment:





Acute cardiac arrest s/p CPR for about 15 minutes with return of spontaneous 

circulation.  Status post intubation by EMS.


Episodes of nonsustained V. tach prior to cardiac arrest which is most likely 

the cause although awaiting full AICD interrogation report


Possible anoxic brain injury with severe encephalopathy noted an EEG


Chronic atrial fibrillation on anticoagulant Eliquis


History of ICD placement


Hyperglycemia


COPD history 


Chronic hypoxic respiratory failure secondary to COPD on 4 L oxygen via nasal 

cannula


Recent history of femoral neck fracture repair in January 2023


History of IVC filter placement


Anxiety/depression history


Full code





Plan:





Patient is currently in the MICU and remains on mechanical ventilation with an 

FiO2 of 50% and PEEP is 5.  Status post CPR and currently on mechanical 

ventilator.  Downtime was was about 15 minutes.  Possible anoxic brain injury. 

EEG repeated today remains with severe encephalopathy maintained on IV Keppra 

with neurology following.  


Patient was continued on hypertonic solution and having elevated sodium level of

150 will repeat this afternoon and consider D5 in water if no improvement


Patient undergoing sedation holidays and continues to not follow commands and 

remains unresponsive 


Continue with antibiotics for pneumonia with Zosyn.  


Continue during Accu-Cheks and continue with insulin sliding scale.


Patient is off pressor support.


Multiple medical consultations following


Given the downtime and severity of neurological assessment and cardiac arrest, 

overall prognosis remains poor. Gift of life also following and evaluating this 

patient is on registry


Family is agreeable with gift of life and donation may possibly take place on 

Sunday when more family members are present








The impression and plan of care has been dictated by Myrtle Hyman, Nurse 

Practitioner as directed.





Dr. Chris MD


I have performed a history and examination and MDM of this patient, discussed 

the same with the dictator, and  agree with the dictator's assessment and plan 

as written ,documented as a scribe. Based on total visit time,  I have performed

more than 50% of the visit.  





Objective





- Vital Signs


Vital signs: 


                                   Vital Signs











Temp  98.6 F   04/20/23 04:00


 


Pulse  75   04/20/23 08:20


 


Resp  22   04/20/23 07:00


 


BP  106/45   04/20/23 07:00


 


Pulse Ox  96   04/20/23 07:00


 


FiO2  50   04/20/23 07:34








                                 Intake & Output











 04/19/23 04/20/23 04/20/23





 18:59 06:59 18:59


 


Intake Total 1064.271 750.260 0


 


Output Total 435 520 


 


Balance 629.271 230.260 0


 


Weight 88 kg 92.2 kg 


 


Intake:   


 


   512 


 


    KVO and Pressure Bag  376 


 


    Phenytoin Sodium Inj 200  36 





    mg In Sodium Chloride 0.9   





    % 36 ml @ 80 mls/hr IVPB   





    Q12HR [facility] Rx#:[id number]   


 


    Sodium Chloride 3%( 125  





    Hypertonic) 500 ml @ 25   





    mls/hr IV .Q20H [facility] Rx#:   





    [id number]   


 


    levETIRAcetam IV 1,500 mg 100 100 





    In Saline 1 100ml.bag @   





    400 mls/hr IVPB Q12HR [facility]   





    Rx#:[id number]   


 


  Intake, IV Titration 324.271 168.260 0





  Amount   


 


    Phenytoin Sodium Inj 1, 100  





    750 mg In Sodium Chloride   





    0.9% 100 ml @ 200 mls/hr   





    IVPB ONCE [facility] Rx#:   





    [id number]   


 


    Piperacillin-Tazobactam 3 100 100 





    .375 gm In Sodium   





    Chloride 0.9% 100 ml @ 25   





    mls/hr IVPB Q8H [facility] Rx#:   





    [id number]   


 


    propofoL 1,000 mg In 124.271 68.260 0





    Empty Bag 1 bag @ 15 MCG/   





    KG/MIN 7.348 mls/hr IV .   





    [id number] [facility] Rx#:[id number]   


 


  Tube Feeding 395 70 


 


  Other 120  


 


Output:   


 


  Urine 435 520 


 


Other:   


 


  Voiding Method Indwelling Catheter Indwelling Catheter 








                       ABP, PAP, CO, CI - Last Documented











Arterial Blood Pressure        128/43

















- Labs


CBC & Chem 7: 


                                 04/20/23 08:30





                                 04/20/23 08:30


Labs: 


                  Abnormal Lab Results - Last 24 Hours (Table)











  04/19/23 04/19/23 04/19/23 Range/Units





  10:00 11:31 11:40 


 


WBC     (3.8-10.6)  k/uL


 


RBC     (3.80-5.40)  m/uL


 


Hgb     (11.4-16.0)  gm/dL


 


Hct     (34.0-46.0)  %


 


MCV     (80.0-100.0)  fL


 


Neutrophils #     (1.3-7.7)  k/uL


 


Lymphocytes #     (1.0-4.8)  k/uL


 


ABG Total CO2     (19-24)  mmol/L


 


ABG O2 Saturation     (94-97)  %


 


Sodium  148 H   148 H  (137-145)  mmol/L


 


Chloride  121 H   120 H  ()  mmol/L


 


BUN    18 H  (7-17)  mg/dL


 


Creatinine  0.50 L   0.49 L  (0.52-1.04)  mg/dL


 


Glucose  134 H   156 H  (74-99)  mg/dL


 


POC Glucose (mg/dL)   142 H   ()  mg/dL


 


Calcium  7.7 L   7.9 L  (8.4-10.2)  mg/dL


 


Phosphorus  1.9 L   1.9 L  (2.5-4.5)  mg/dL


 


Magnesium     (1.6-2.3)  mg/dL


 


AST  85 H   90 H  (14-36)  U/L


 


Total Protein  5.4 L   5.4 L  (6.3-8.2)  g/dL


 


Albumin  2.8 L   2.8 L  (3.5-5.0)  g/dL


 


Urine Protein     (Negative)  


 


Urine Mucus     (None)  /hpf














  04/19/23 04/19/23 04/19/23 Range/Units





  15:57 16:00 22:57 


 


WBC     (3.8-10.6)  k/uL


 


RBC   2.81 L   (3.80-5.40)  m/uL


 


Hgb   9.0 L   (11.4-16.0)  gm/dL


 


Hct   27.8 L   (34.0-46.0)  %


 


MCV     (80.0-100.0)  fL


 


Neutrophils #     (1.3-7.7)  k/uL


 


Lymphocytes #   0.5 L   (1.0-4.8)  k/uL


 


ABG Total CO2     (19-24)  mmol/L


 


ABG O2 Saturation     (94-97)  %


 


Sodium     (137-145)  mmol/L


 


Chloride     ()  mmol/L


 


BUN     (7-17)  mg/dL


 


Creatinine     (0.52-1.04)  mg/dL


 


Glucose     (74-99)  mg/dL


 


POC Glucose (mg/dL)  172 H   165 H  ()  mg/dL


 


Calcium     (8.4-10.2)  mg/dL


 


Phosphorus     (2.5-4.5)  mg/dL


 


Magnesium     (1.6-2.3)  mg/dL


 


AST     (14-36)  U/L


 


Total Protein     (6.3-8.2)  g/dL


 


Albumin     (3.5-5.0)  g/dL


 


Urine Protein     (Negative)  


 


Urine Mucus     (None)  /hpf














  04/20/23 04/20/23 04/20/23 Range/Units





  00:00 02:30 04:15 


 


WBC     (3.8-10.6)  k/uL


 


RBC   2.65 L   (3.80-5.40)  m/uL


 


Hgb   8.6 L   (11.4-16.0)  gm/dL


 


Hct   26.6 L   (34.0-46.0)  %


 


MCV   100.5 H   (80.0-100.0)  fL


 


Neutrophils #     (1.3-7.7)  k/uL


 


Lymphocytes #   0.4 L   (1.0-4.8)  k/uL


 


ABG Total CO2    26 H  (19-24)  mmol/L


 


ABG O2 Saturation    97.6 H  (94-97)  %


 


Sodium  146 H    (137-145)  mmol/L


 


Chloride  118 H    ()  mmol/L


 


BUN  21 H    (7-17)  mg/dL


 


Creatinine  0.41 L    (0.52-1.04)  mg/dL


 


Glucose  184 H    (74-99)  mg/dL


 


POC Glucose (mg/dL)     ()  mg/dL


 


Calcium  7.6 L    (8.4-10.2)  mg/dL


 


Phosphorus  2.4 L    (2.5-4.5)  mg/dL


 


Magnesium  2.4 H    (1.6-2.3)  mg/dL


 


AST  83 H    (14-36)  U/L


 


Total Protein  5.3 L    (6.3-8.2)  g/dL


 


Albumin  2.8 L    (3.5-5.0)  g/dL


 


Urine Protein     (Negative)  


 


Urine Mucus     (None)  /hpf














  04/20/23 04/20/23 04/20/23 Range/Units





  05:34 05:34 05:44 


 


WBC     (3.8-10.6)  k/uL


 


RBC     (3.80-5.40)  m/uL


 


Hgb     (11.4-16.0)  gm/dL


 


Hct     (34.0-46.0)  %


 


MCV     (80.0-100.0)  fL


 


Neutrophils #     (1.3-7.7)  k/uL


 


Lymphocytes #     (1.0-4.8)  k/uL


 


ABG Total CO2     (19-24)  mmol/L


 


ABG O2 Saturation     (94-97)  %


 


Sodium     (137-145)  mmol/L


 


Chloride  120 H    ()  mmol/L


 


BUN  22 H    (7-17)  mg/dL


 


Creatinine  0.42 L    (0.52-1.04)  mg/dL


 


Glucose  182 H    (74-99)  mg/dL


 


POC Glucose (mg/dL)    160 H  ()  mg/dL


 


Calcium  7.8 L    (8.4-10.2)  mg/dL


 


Phosphorus  2.3 L    (2.5-4.5)  mg/dL


 


Magnesium  2.4 H    (1.6-2.3)  mg/dL


 


AST  83 H    (14-36)  U/L


 


Total Protein  5.3 L    (6.3-8.2)  g/dL


 


Albumin  2.8 L    (3.5-5.0)  g/dL


 


Urine Protein   1+ H   (Negative)  


 


Urine Mucus   Rare H   (None)  /hpf














  04/20/23 04/20/23 Range/Units





  08:30 08:30 


 


WBC  11.5 H   (3.8-10.6)  k/uL


 


RBC  3.02 L   (3.80-5.40)  m/uL


 


Hgb  9.6 L   (11.4-16.0)  gm/dL


 


Hct  30.3 L   (34.0-46.0)  %


 


MCV  100.6 H   (80.0-100.0)  fL


 


Neutrophils #  9.8 H   (1.3-7.7)  k/uL


 


Lymphocytes #    (1.0-4.8)  k/uL


 


ABG Total CO2    (19-24)  mmol/L


 


ABG O2 Saturation    (94-97)  %


 


Sodium   150 H  (137-145)  mmol/L


 


Chloride   118 H  ()  mmol/L


 


BUN   21 H  (7-17)  mg/dL


 


Creatinine   0.51 L  (0.52-1.04)  mg/dL


 


Glucose   177 H  (74-99)  mg/dL


 


POC Glucose (mg/dL)    ()  mg/dL


 


Calcium   8.0 L  (8.4-10.2)  mg/dL


 


Phosphorus    (2.5-4.5)  mg/dL


 


Magnesium    (1.6-2.3)  mg/dL


 


AST   90 H  (14-36)  U/L


 


Total Protein   5.9 L  (6.3-8.2)  g/dL


 


Albumin   3.1 L  (3.5-5.0)  g/dL


 


Urine Protein    (Negative)  


 


Urine Mucus    (None)  /hpf








                      Microbiology - Last 24 Hours (Table)











 04/15/23 13:34 Blood Culture - Preliminary





 Blood    No Growth after 96 hours


 


 04/15/23 13:34 Blood Culture - Preliminary





 Blood    No Growth after 96 hours


 


 04/15/23 11:19 Legionella Culture - Preliminary





 Sputum

## 2023-04-20 NOTE — P.PN
Subjective


Progress Note Date: 04/20/23 04/20/2023: Patient was seen for a follow-up patient currently on propofol 5 

mcg/kg gram per minute.  Patient had an EEG performed early this morning.  

Sedation was discontinued during EEG.  Patient started blinking, and coughing, 

therefore she was put back on propofol.





04/19/2023: Patient was seen for a follow-up.  Patient's niece was present at 

this time.  Patient essentially unchanged.  No clinical improvement.  Patient is

having some rhythmic eye twitching when the eyes are open, all with painful 

stimuli.  At present patient is on propofol 20 mcg/kg/m.  With sedation holiday,

she was twitching, almost like seizures, therefore she received Ativan earlier.





04/18/2023: Patient currently on propofol 30 mcg/kg/m.  Per nursing note, with 

sedation holiday for about 20 minutes, patient was not following any commands.  

She became tachypneic therefore was given 1 mg of Ativan, and propofol 

restarted.  Her examination continues to be very abnormal as below.  Per nurse 

report, patient's eyes opens and close, but is not awake.  Only sclerae is vis

ible.





04/17/2023: Patient initially seen by Dr. Allen Brannon.  Please refer to his note 

for details.





Patient is a 67-year-old female, who came to the hospital with cardiopulmonary 

arrest on 04/15/2023 at 9:37 AM.  The downtime was about 15 minutes.  Patient's 

sister and another relative was present.  Patient's nurse was also present.





Patient continues to be comatose.  She had sedation holiday performed for about 

half an hour at 9:50 AM in which she was not showing any meaningful response.  

At present patient on propofol 15 mcg/kg/m. patient's nurse mentioned that blake

ent became tachycardic and blood pressure went up to 180 systolic, therefore she

was placed back on propofol.  No seizure-like activity noted.  Patient received 

Ativan 2 mg last night.





Objective





- Vital Signs


Vital signs: 


                                   Vital Signs











Temp  100.4 F H  04/20/23 08:00


 


Pulse  75   04/20/23 10:00


 


Resp  20   04/20/23 10:00


 


BP  106/45   04/20/23 07:00


 


Pulse Ox  96   04/20/23 10:00


 


FiO2  50   04/20/23 10:55








                                 Intake & Output











 04/19/23 04/20/23 04/20/23





 18:59 06:59 18:59


 


Intake Total 1064.271 750.260 475


 


Output Total 435 520 125


 


Balance 629.271 230.260 350


 


Weight 88 kg 92.2 kg 


 


Intake:   


 


   512 205


 


    KVO and Pressure Bag  376 69


 


    Phenytoin Sodium Inj 200  36 36





    mg In Sodium Chloride 0.9   





    % 36 ml @ 80 mls/hr IVPB   





    Q12HR [facility] Rx#:[id number]   


 


    Sodium Chloride 3%( 125  





    Hypertonic) 500 ml @ 25   





    mls/hr IV .Q20H [facility] Rx#:   





    [id number]   


 


    levETIRAcetam IV 1,500 mg 100 100 100





    In Saline 1 100ml.bag @   





    400 mls/hr IVPB Q12HR [facility]   





    Rx#:[id number]   


 


  Intake, IV Titration 324.271 168.260 100





  Amount   


 


    Phenytoin Sodium Inj 1, 100  





    750 mg In Sodium Chloride   





    0.9% 100 ml @ 200 mls/hr   





    IVPB ONCE STA Rx#:   





    [id number]   


 


    Piperacillin-Tazobactam 3 100 100 100





    .375 gm In Sodium   





    Chloride 0.9% 100 ml @ 25   





    mls/hr IVPB Q8H [facility] Rx#:   





    [id number]   


 


    propofoL 1,000 mg In 124.271 68.260 0





    Empty Bag 1 bag @ 15 MCG/   





    KG/MIN 7.348 mls/hr IV .   





    [id number] [facility] Rx#:[id number]   


 


  Tube Feeding 395 70 140


 


  Other 120  30


 


Output:   


 


  Urine 435 520 125


 


Other:   


 


  Voiding Method Indwelling Catheter Indwelling Catheter Indwelling Catheter








                       ABP, PAP, CO, CI - Last Documented











Arterial Blood Pressure        151/56

















- Exam





Patient continues to be comatose, with GCS of 3.  Patient not responding to 

calling her name or painful stimuli.





Patient's eyes are closed, and with painful stimuli, although with manually 

opening the eyes, some rhythmic twitching of the eyelids were noted.  Patient 

had gaze deviated upwards into the right.   Very abnormal extraocular muscles.  

Oculocephalics present, corneals absent.  





Patient is slightly breathing over the ventilator, as she is set at rate of 

20/m, and sometimes it goes up to 22/m..  She has a weak gag.  Her pupils are 

equal, round and reacting.   





However no twitching noted at rest of the body otherwise.





- Labs


CBC & Chem 7: 


                                 04/20/23 08:30





                                 04/20/23 08:30


Labs: 


                  Abnormal Lab Results - Last 24 Hours (Table)











  04/19/23 04/19/23 04/19/23 Range/Units





  11:31 11:40 15:57 


 


WBC     (3.8-10.6)  k/uL


 


RBC     (3.80-5.40)  m/uL


 


Hgb     (11.4-16.0)  gm/dL


 


Hct     (34.0-46.0)  %


 


MCV     (80.0-100.0)  fL


 


Neutrophils #     (1.3-7.7)  k/uL


 


Lymphocytes #     (1.0-4.8)  k/uL


 


ABG Total CO2     (19-24)  mmol/L


 


ABG O2 Saturation     (94-97)  %


 


Sodium   148 H   (137-145)  mmol/L


 


Chloride   120 H   ()  mmol/L


 


BUN   18 H   (7-17)  mg/dL


 


Creatinine   0.49 L   (0.52-1.04)  mg/dL


 


Glucose   156 H   (74-99)  mg/dL


 


POC Glucose (mg/dL)  142 H   172 H  ()  mg/dL


 


Calcium   7.9 L   (8.4-10.2)  mg/dL


 


Phosphorus   1.9 L   (2.5-4.5)  mg/dL


 


Magnesium     (1.6-2.3)  mg/dL


 


AST   90 H   (14-36)  U/L


 


Total Protein   5.4 L   (6.3-8.2)  g/dL


 


Albumin   2.8 L   (3.5-5.0)  g/dL


 


Urine Protein     (Negative)  


 


Urine Mucus     (None)  /hpf














  04/19/23 04/19/23 04/20/23 Range/Units





  16:00 22:57 00:00 


 


WBC     (3.8-10.6)  k/uL


 


RBC  2.81 L    (3.80-5.40)  m/uL


 


Hgb  9.0 L    (11.4-16.0)  gm/dL


 


Hct  27.8 L    (34.0-46.0)  %


 


MCV     (80.0-100.0)  fL


 


Neutrophils #     (1.3-7.7)  k/uL


 


Lymphocytes #  0.5 L    (1.0-4.8)  k/uL


 


ABG Total CO2     (19-24)  mmol/L


 


ABG O2 Saturation     (94-97)  %


 


Sodium    146 H  (137-145)  mmol/L


 


Chloride    118 H  ()  mmol/L


 


BUN    21 H  (7-17)  mg/dL


 


Creatinine    0.41 L  (0.52-1.04)  mg/dL


 


Glucose    184 H  (74-99)  mg/dL


 


POC Glucose (mg/dL)   165 H   ()  mg/dL


 


Calcium    7.6 L  (8.4-10.2)  mg/dL


 


Phosphorus    2.4 L  (2.5-4.5)  mg/dL


 


Magnesium    2.4 H  (1.6-2.3)  mg/dL


 


AST    83 H  (14-36)  U/L


 


Total Protein    5.3 L  (6.3-8.2)  g/dL


 


Albumin    2.8 L  (3.5-5.0)  g/dL


 


Urine Protein     (Negative)  


 


Urine Mucus     (None)  /hpf














  04/20/23 04/20/23 04/20/23 Range/Units





  02:30 04:15 05:34 


 


WBC     (3.8-10.6)  k/uL


 


RBC  2.65 L    (3.80-5.40)  m/uL


 


Hgb  8.6 L    (11.4-16.0)  gm/dL


 


Hct  26.6 L    (34.0-46.0)  %


 


MCV  100.5 H    (80.0-100.0)  fL


 


Neutrophils #     (1.3-7.7)  k/uL


 


Lymphocytes #  0.4 L    (1.0-4.8)  k/uL


 


ABG Total CO2   26 H   (19-24)  mmol/L


 


ABG O2 Saturation   97.6 H   (94-97)  %


 


Sodium     (137-145)  mmol/L


 


Chloride    120 H  ()  mmol/L


 


BUN    22 H  (7-17)  mg/dL


 


Creatinine    0.42 L  (0.52-1.04)  mg/dL


 


Glucose    182 H  (74-99)  mg/dL


 


POC Glucose (mg/dL)     ()  mg/dL


 


Calcium    7.8 L  (8.4-10.2)  mg/dL


 


Phosphorus    2.3 L  (2.5-4.5)  mg/dL


 


Magnesium    2.4 H  (1.6-2.3)  mg/dL


 


AST    83 H  (14-36)  U/L


 


Total Protein    5.3 L  (6.3-8.2)  g/dL


 


Albumin    2.8 L  (3.5-5.0)  g/dL


 


Urine Protein     (Negative)  


 


Urine Mucus     (None)  /hpf














  04/20/23 04/20/23 04/20/23 Range/Units





  05:34 05:44 08:30 


 


WBC    11.5 H  (3.8-10.6)  k/uL


 


RBC    3.02 L  (3.80-5.40)  m/uL


 


Hgb    9.6 L  (11.4-16.0)  gm/dL


 


Hct    30.3 L  (34.0-46.0)  %


 


MCV    100.6 H  (80.0-100.0)  fL


 


Neutrophils #    9.8 H  (1.3-7.7)  k/uL


 


Lymphocytes #     (1.0-4.8)  k/uL


 


ABG Total CO2     (19-24)  mmol/L


 


ABG O2 Saturation     (94-97)  %


 


Sodium     (137-145)  mmol/L


 


Chloride     ()  mmol/L


 


BUN     (7-17)  mg/dL


 


Creatinine     (0.52-1.04)  mg/dL


 


Glucose     (74-99)  mg/dL


 


POC Glucose (mg/dL)   160 H   ()  mg/dL


 


Calcium     (8.4-10.2)  mg/dL


 


Phosphorus     (2.5-4.5)  mg/dL


 


Magnesium     (1.6-2.3)  mg/dL


 


AST     (14-36)  U/L


 


Total Protein     (6.3-8.2)  g/dL


 


Albumin     (3.5-5.0)  g/dL


 


Urine Protein  1+ H    (Negative)  


 


Urine Mucus  Rare H    (None)  /hpf














  04/20/23 Range/Units





  08:30 


 


WBC   (3.8-10.6)  k/uL


 


RBC   (3.80-5.40)  m/uL


 


Hgb   (11.4-16.0)  gm/dL


 


Hct   (34.0-46.0)  %


 


MCV   (80.0-100.0)  fL


 


Neutrophils #   (1.3-7.7)  k/uL


 


Lymphocytes #   (1.0-4.8)  k/uL


 


ABG Total CO2   (19-24)  mmol/L


 


ABG O2 Saturation   (94-97)  %


 


Sodium  150 H  (137-145)  mmol/L


 


Chloride  118 H  ()  mmol/L


 


BUN  21 H  (7-17)  mg/dL


 


Creatinine  0.51 L  (0.52-1.04)  mg/dL


 


Glucose  177 H  (74-99)  mg/dL


 


POC Glucose (mg/dL)   ()  mg/dL


 


Calcium  8.0 L  (8.4-10.2)  mg/dL


 


Phosphorus   (2.5-4.5)  mg/dL


 


Magnesium   (1.6-2.3)  mg/dL


 


AST  90 H  (14-36)  U/L


 


Total Protein  5.9 L  (6.3-8.2)  g/dL


 


Albumin  3.1 L  (3.5-5.0)  g/dL


 


Urine Protein   (Negative)  


 


Urine Mucus   (None)  /hpf








                      Microbiology - Last 24 Hours (Table)











 04/15/23 13:34 Blood Culture - Preliminary





 Blood    No Growth after 96 hours


 


 04/15/23 13:34 Blood Culture - Preliminary





 Blood    No Growth after 96 hours


 


 04/15/23 11:19 Legionella Culture - Preliminary





 Sputum 














Assessment and Plan


Assessment: 





Cardiopulmonary arrest with downtime lasting for 15 minutes.  It appears the 

patient had episode of nonsustained V. tach is seems to happen 1 hour prior to 

the cardiac arrest but per cardiology unsure if true VT or A.fib


Anoxic encephalopathy.  Patient's computed tomography scan showing mild diffuse 

cerebral edema.  Some effacement of the lateral ventricles.  Patient has 

slightly preserved brainstem reflexes at this time.  Very abnormal extraocular 

muscles examination.





Probable seizures due to cardiopulmonary arrest- now resolved.  However patient 

has intermittent rhythmic twitching of the eyelids particularly with sedation 

holiday, suggestive of underlying partial status.  However repeat EEG performed 

today was negative for epileptiform activity.





Cardiomyopathy with ejection fraction of 30-35%


Status post intubation and mechanical ventilation due to cardiopulmonary last


Non-STEMI


Paroxysmal atrial fibrillation


History of stroke


AICD


Hypertension


CAD with prior PCI


History of green filter placement














Plan: 





Patient is past 5 days post cardiac arrest.  No clinical improvement.  Her 

examination is severely abnormal.  Prognosis for meaningful recovery is very 

poor at this time.





Repeat EEG performed today 04/20/2023 was very severely abnormal due to severely

suppressed background in bihemispheric region, consistent with diffuse, global 

encephalopathy as can be seen with anoxic encephalopathy.  Some eye blink 

artifacts were seen but there was no associated epileptiform activity with it.  

No electrographic evidence of status epilepticus.  





Continue Dilantin and Keppra.  Dilantin level is therapeutic 17.8, patient also 

on Keppra 


Based upon the clinical examination, and EEG findings, and her prior CT report, 

no need to repeat CT head.  Family has decided pursuing with gift of life.





EEG 04/16/2023 was abnormal routine EEG due to diffuse suppression, which can be

due to either medication-induced or due to anoxia.  The background slowing is 

suggestive of severe encephalopathy.  Otherwise no focal slowing, or 

epileptiform discharges were seen.


Continue Keppra 1500 mg IV every 12 hours (new during this admission).





CT head performed at 5 AM on 04/17/2023 showed no acute intracranial hemorrhage 

or midline shift.  There is mild diffuse cerebral atrophy redemonstrated.  

Resolved left maxillary acute sinusitis.  Slightly worsening bilateral sphenoid 

acute sinusitis.  On my review, there is definite evidence of at least mild 

generalized cerebral edema, with some effacement of the lateral ventricles.  


 


Will defer management to primary and ICU team.


Patient is 5 days post cardiac arrest, with comatose state and severely abnormal

examination as above.  Prognosis for meaningful recovery appears poor.





Discussed with patient's nurse.


Family have decided patient to be a candidate for gift of life.


Please call neurology if any other concerns.

## 2023-04-21 LAB
ANION GAP SERPL CALC-SCNC: 2 MMOL/L
ANION GAP SERPL CALC-SCNC: 3 MMOL/L
BASOPHILS # BLD AUTO: 0 K/UL (ref 0–0.2)
BASOPHILS NFR BLD AUTO: 0 %
BUN SERPL-SCNC: 20 MG/DL (ref 7–17)
BUN SERPL-SCNC: 21 MG/DL (ref 7–17)
CALCIUM SPEC-MCNC: 7.8 MG/DL (ref 8.4–10.2)
CALCIUM SPEC-MCNC: 8 MG/DL (ref 8.4–10.2)
CHLORIDE SERPL-SCNC: 116 MMOL/L (ref 98–107)
CHLORIDE SERPL-SCNC: 118 MMOL/L (ref 98–107)
CO2 BLDA-SCNC: 26 MMOL/L (ref 19–24)
CO2 SERPL-SCNC: 26 MMOL/L (ref 22–30)
CO2 SERPL-SCNC: 27 MMOL/L (ref 22–30)
EOSINOPHIL # BLD AUTO: 0 K/UL (ref 0–0.7)
EOSINOPHIL NFR BLD AUTO: 1 %
ERYTHROCYTE [DISTWIDTH] IN BLOOD BY AUTOMATED COUNT: 2.67 M/UL (ref 3.8–5.4)
ERYTHROCYTE [DISTWIDTH] IN BLOOD: 14.7 % (ref 11.5–15.5)
GLUCOSE BLD-MCNC: 133 MG/DL (ref 70–110)
GLUCOSE BLD-MCNC: 152 MG/DL (ref 70–110)
GLUCOSE BLD-MCNC: 158 MG/DL (ref 70–110)
GLUCOSE BLD-MCNC: 167 MG/DL (ref 70–110)
GLUCOSE BLD-MCNC: 182 MG/DL (ref 70–110)
GLUCOSE SERPL-MCNC: 149 MG/DL (ref 74–99)
GLUCOSE SERPL-MCNC: 180 MG/DL (ref 74–99)
HCO3 BLDA-SCNC: 25 MMOL/L (ref 21–25)
HCT VFR BLD AUTO: 26.7 % (ref 34–46)
HGB BLD-MCNC: 8.5 GM/DL (ref 11.4–16)
LYMPHOCYTES # SPEC AUTO: 0.5 K/UL (ref 1–4.8)
LYMPHOCYTES NFR SPEC AUTO: 9 %
MAGNESIUM SPEC-SCNC: 2.2 MG/DL (ref 1.6–2.3)
MAGNESIUM SPEC-SCNC: 2.4 MG/DL (ref 1.6–2.3)
MCH RBC QN AUTO: 31.8 PG (ref 25–35)
MCHC RBC AUTO-ENTMCNC: 31.7 G/DL (ref 31–37)
MCV RBC AUTO: 100.3 FL (ref 80–100)
MONOCYTES # BLD AUTO: 0.3 K/UL (ref 0–1)
MONOCYTES NFR BLD AUTO: 5 %
NEUTROPHILS # BLD AUTO: 4.7 K/UL (ref 1.3–7.7)
NEUTROPHILS NFR BLD AUTO: 85 %
PCO2 BLDA: 40 MMHG (ref 35–45)
PH BLDA: 7.4 [PH] (ref 7.35–7.45)
PLATELET # BLD AUTO: 169 K/UL (ref 150–450)
PO2 BLDA: 105 MMHG (ref 83–108)
POTASSIUM SERPL-SCNC: 4 MMOL/L (ref 3.5–5.1)
POTASSIUM SERPL-SCNC: 4 MMOL/L (ref 3.5–5.1)
SODIUM SERPL-SCNC: 145 MMOL/L (ref 137–145)
SODIUM SERPL-SCNC: 147 MMOL/L (ref 137–145)
WBC # BLD AUTO: 5.6 K/UL (ref 3.8–10.6)

## 2023-04-21 RX ADMIN — POTASSIUM CHLORIDE SCH: 14.9 INJECTION, SOLUTION INTRAVENOUS at 17:14

## 2023-04-21 RX ADMIN — CHLORHEXIDINE GLUCONATE SCH ML: 1.2 RINSE ORAL at 20:59

## 2023-04-21 RX ADMIN — IPRATROPIUM BROMIDE AND ALBUTEROL SULFATE SCH ML: .5; 3 SOLUTION RESPIRATORY (INHALATION) at 15:20

## 2023-04-21 RX ADMIN — APIXABAN SCH MG: 5 TABLET, FILM COATED ORAL at 21:00

## 2023-04-21 RX ADMIN — INSULIN ASPART SCH UNIT: 100 INJECTION, SOLUTION INTRAVENOUS; SUBCUTANEOUS at 18:37

## 2023-04-21 RX ADMIN — IPRATROPIUM BROMIDE AND ALBUTEROL SULFATE SCH ML: .5; 3 SOLUTION RESPIRATORY (INHALATION) at 23:07

## 2023-04-21 RX ADMIN — PIPERACILLIN AND TAZOBACTAM SCH MLS/HR: 3; .375 INJECTION, POWDER, FOR SOLUTION INTRAVENOUS at 10:14

## 2023-04-21 RX ADMIN — IPRATROPIUM BROMIDE AND ALBUTEROL SULFATE SCH ML: .5; 3 SOLUTION RESPIRATORY (INHALATION) at 01:48

## 2023-04-21 RX ADMIN — NICARDIPINE HYDROCHLORIDE SCH MLS/HR: 2.5 INJECTION INTRAVENOUS at 21:03

## 2023-04-21 RX ADMIN — PHENYTOIN SODIUM SCH MLS/HR: 50 INJECTION INTRAMUSCULAR; INTRAVENOUS at 10:13

## 2023-04-21 RX ADMIN — METOPROLOL TARTRATE SCH MG: 25 TABLET, FILM COATED ORAL at 20:59

## 2023-04-21 RX ADMIN — INSULIN ASPART SCH: 100 INJECTION, SOLUTION INTRAVENOUS; SUBCUTANEOUS at 05:36

## 2023-04-21 RX ADMIN — INSULIN ASPART SCH UNIT: 100 INJECTION, SOLUTION INTRAVENOUS; SUBCUTANEOUS at 12:00

## 2023-04-21 RX ADMIN — ATORVASTATIN CALCIUM SCH MG: 80 TABLET, FILM COATED ORAL at 08:39

## 2023-04-21 RX ADMIN — ACETAMINOPHEN PRN MG: 325 TABLET, FILM COATED ORAL at 17:15

## 2023-04-21 RX ADMIN — PHENYTOIN SODIUM SCH MLS/HR: 50 INJECTION INTRAMUSCULAR; INTRAVENOUS at 21:21

## 2023-04-21 RX ADMIN — MIDAZOLAM SCH: 5 INJECTION INTRAMUSCULAR; INTRAVENOUS at 00:42

## 2023-04-21 RX ADMIN — IPRATROPIUM BROMIDE AND ALBUTEROL SULFATE SCH ML: .5; 3 SOLUTION RESPIRATORY (INHALATION) at 19:44

## 2023-04-21 RX ADMIN — PIPERACILLIN AND TAZOBACTAM SCH MLS/HR: 3; .375 INJECTION, POWDER, FOR SOLUTION INTRAVENOUS at 18:37

## 2023-04-21 RX ADMIN — INSULIN ASPART SCH UNIT: 100 INJECTION, SOLUTION INTRAVENOUS; SUBCUTANEOUS at 00:38

## 2023-04-21 RX ADMIN — APIXABAN SCH MG: 5 TABLET, FILM COATED ORAL at 08:39

## 2023-04-21 RX ADMIN — IPRATROPIUM BROMIDE AND ALBUTEROL SULFATE SCH ML: .5; 3 SOLUTION RESPIRATORY (INHALATION) at 12:23

## 2023-04-21 RX ADMIN — LEVETIRACETAM SCH MLS/HR: 15 INJECTION INTRAVENOUS at 20:59

## 2023-04-21 RX ADMIN — PANTOPRAZOLE SODIUM SCH MG: 40 INJECTION, POWDER, FOR SOLUTION INTRAVENOUS at 08:36

## 2023-04-21 RX ADMIN — IPRATROPIUM BROMIDE AND ALBUTEROL SULFATE SCH ML: .5; 3 SOLUTION RESPIRATORY (INHALATION) at 05:02

## 2023-04-21 RX ADMIN — POTASSIUM CHLORIDE SCH: 14.9 INJECTION, SOLUTION INTRAVENOUS at 03:22

## 2023-04-21 RX ADMIN — PIPERACILLIN AND TAZOBACTAM SCH MLS/HR: 3; .375 INJECTION, POWDER, FOR SOLUTION INTRAVENOUS at 02:23

## 2023-04-21 RX ADMIN — METOPROLOL TARTRATE SCH MG: 25 TABLET, FILM COATED ORAL at 08:39

## 2023-04-21 RX ADMIN — LEVETIRACETAM SCH MLS/HR: 15 INJECTION INTRAVENOUS at 08:38

## 2023-04-21 RX ADMIN — IPRATROPIUM BROMIDE AND ALBUTEROL SULFATE SCH ML: .5; 3 SOLUTION RESPIRATORY (INHALATION) at 08:13

## 2023-04-21 RX ADMIN — CHLORHEXIDINE GLUCONATE SCH ML: 1.2 RINSE ORAL at 08:39

## 2023-04-21 NOTE — P.PN
Subjective


Progress Note Date: 04/21/23








67-year-old female who apparently had a cardiopulmonary arrest, at a local 

department store/[employer].  The patient apparently had a downtime of about 15 

minutes, for there was cardiopulmonary resuscitation and return of spontaneous 

circulation.  The patient was seen in the ER, by the ER physician, Dr. Pennington.  

The patient was vented, and a right femoral vein triple lumen catheter was 

placed.  Family members are in the room, we selected see the patient.  The 

patient herself is unresponsive.  She has a orally placed endotracheal tube.  

She's currently on the ventilator, with vent settings of volume assist control, 

rate 20, tidal volume 375, FiO2 100%, 5.  Blood gases show pO2 75, pCO2 of 53, 

and a pH is 7.17.  The patient is on norepinephrine at 0.15 mcg/kg/m, and 

propofol at 15 mcg/kg/m.  The patient has a history of COPD from secondhand 

tobacco exposure, a previous history of the fibrillator placement for 

cardiomyopathy, and history of stroke, and Sadaf filter placement.  In 

addition, the patient is on home O2, that she is supposed to use all the time, 

but she only uses as needed.  She was a smoker in the distant past.  White count

9.7, hemoglobin 11.4, hematocrit 37.9, and platelet count was normal.  D-dimer 

was 3.10.  Sodium 137, potassium 4.3, chlorides 104, CO2 17, anion gap 16, BUN 

and creatinine 12 and 0.76.  Troponin was 0.022 and N-terminal proBNP was 2290. 

Lactate was not measured but is probably elevated.  Testing for influenza A, and

B, RSV, and coronavirus are all negative.  Chest x-ray shows evidence of 

cardiomegaly, fluid overload, and an appropriately placed endotracheal tube.  

Computed tomography scan of the brain showed nothing acute.  CT angiogram was 

negative for PE.  There also may be some right lower lobe consolidation 

consistent with prior aspiration.





Progress note dated 04/16/2023.





67-year-old female who had an out-of-hospital cardiopulmonary arrest.  The 

patient had about 15 minutes of resuscitation before there was return of 

spontaneous circulation.  Unfortunately, the patient may have sustained anoxic 

brain injury.  Today, we place an art line.  She remains on the ventilator.  I 

did have neurology see her.  She is on volume assist control, rate 20, tidal 

volume 375, FiO2 60%, PEEP of 5.  Arterial blood gases show pO2 of 90, pCO2 34, 

and pH is 7.35.  The patient is on norepinephrine at 0.05 mcg/kg/m, propofol at 

40 mcg/kg/m, and saline at 75 mL an hour.  We will discontinue the Levaquin and 

Flagyl and start Zosyn.  In addition we'll start some tube feeds, at 10 mL an 

hour.  A right radial art line will be placed today.  White count 8.9, 

hemoglobin 10.8, hematocrit 34.1, and platelet count is normal.  Sodium 139, 

potassium 3.9, chlorides 112, CO2 18, BUN 19, creatinine 0.71.  Troponins were 

0.086 and 0.091.  Chest x-ray shows persistent bilateral infiltrates, more so in

the right lung than on the left.  This may relate to aspiration.








On today's evaluation of 04/18/2023, seeing the patient for a follow-up.  This 

is a case of a cardiac arrest with at least 15 minutes downtime.  The patient 

has an AICD in place.  Initial cardiac rhythm is not clear.  The did not have 

any pacemaker discharges and the pacemaker has not been interrogated yet.  Noted

the patient has a pacer AICD in place.  Her current cardiac rhythm is paced and 

her rate is currently at 76.  Hemodynamically, the patient is stable on no 

pressors.  The patient is maintaining her own blood pressure.  Meanwhile, the 

patient's troponins did not rise and the troponin peaked at 0.09.  The patient 

currently has signs of anoxic encephalopathy.  Attempts to wean her off the 

sedation yesterday feel that the patient became quite hypertensive and 

tachypneic and she did not show any reasonable neurological recovery.  Neurology

is on the case for the potential anoxic encephalopathy.  CAT scan of the brain 

was done on 04/15/2023 and 04/17/2023 and there is mild diffuse cerebral atrophy

without any acute brain changes.  There is bilateral sphenoid sinus disease.  

EEG was also done on 04/16/2023 indicating diffuse suppression due to anoxia.  

The patient is currently on propofol which is running at the rate of 15 

mcg/kg/m.  The patient is also on IV Keppra 1.5 g every 12 hours.  This morning,

the patient remains on a mechanical ventilator patient is on assist control mode

at the rate of 20, tidal volume of 375, FiO2 of 50% with a PEEP of 5.  The blood

gas shows a pH of 7.42 with a pCO2 of 34 and pO2 112.  Sodium is at 141 with a 

potassium level of 4.4, BUN is at 17 with a creatinine of 0.59.  WBC count at 

7.2 with a hemoglobin 9.3 and a platelet count of 163.  The chest x-ray from 

this morning is showing cardiomegaly along with bilateral pleural effusion.  ET 

tube is in a good location.  The patient also has a orogastric tube in place.  A

CT angiogram of the chest that was done on 04/15/2023 showed bilateral lower 

lobe and upper lobe consolidation and scattered areas of multifocal groundglass 

opacities throughout the lung.  Possibility of aspiration was considered in 

addition to underlying cardiomegaly and multiple acute minimally displaced 

anterior rib fractures in the setting of CPR.  IV fluids are currently at the 

rate of 25 mL an hour of 3% hypertonic saline.  This was initiated by neurology 

regarding the potential of CNS swelling.  As mentioned, the sodium level is at 

141 from this morning.  No seizure activity has been noted.Patient is currently 

on antibiotic without liquids 5 mg by mouth twice a day.  The patient is on met

oprolol 25 mg by mouth twice a day.  The patient is on Zestril 5 mg by mouth 

twice a day.  The patient is also on empiric antibiotic coverage with IV Zosyn. 

The echocardiogram showed an ejection fraction of 30-35% with global LV 

dysfunction.  There was mild aortic stenosis.





On today's evaluation of 04/19/2023, the patient remains neurologically impaired

and and anoxic encephalopathy.  The patient is post cardiac arrest.  The patient

is currently intubated on a mechanical ventilator.  Propofol is running at 20 

mcg/kg/m.  This is to maintain synchrony with a mechanical ventilator.  The 

patient is receiving daily sedation holidays.  She remains on a mechanical 

ventilator.  This morning, she is on assist-control mode at the rate of 20, 

tidal volume of 375, FiO2 of 50% with a PEEP of 5.  The blood gas from today 

shows a pH of 7.39 with a pCO2 of 36 and the pO2 is currently at 87.  The chest 

x-ray from today shows elevation of the right hemidiaphragm, a pacemaker is 

still present in the left anterior chest.  No significant airspace disease or 

pulmonary infiltrates.  Orogastric tube is in a good location.  Meanwhile, the 

patient carries a WBC count of 5.8 with a hemoglobin of 8.6 and a platelet count

of 151.  BUN is 18 with a creatinine of 0.5 and a sodium level is at 146.  UA is

negative.  Give a flight has been contacted.  As part of their workup, a CAT 

scan of the chest abdomen and pelvis was done.  There is left-sided rib 

fractures probably related to CPR.  No acute intra-abdominal abnormalities.  

Some consolidation changes in the lung bases are seen probably related to atel

ectasis.  Unfortunately, neurologically, the patient continues to be 

significantly impaired.  No reasonable neurological recovery while off propofol.

 No seizure activity and the patient remains on Keppra.  The patient is also 

receiving enteral feeding for nutritional support and she is currently on vital 

AF.  The patient remains on the hypertonic saline per neurology's 

recommendation.  The patient remains on Keppra.  The patient is on IV Zosyn.  

Her anticoagulation has been resumed she remains on Eliquis 5 mg by mouth twice 

a day.





On today's evaluation of 04/20/2023, the patient is intubated on a mechanical 

ventilator, she remains on propofol running at 10 mcg/kg/m.  Deeply comatose and

unresponsive.  Earlier this morning, twitching of the eyes was also noted and 

she is a possible undergoing an EEG today.  As mentioned, she is a case of 

severe anoxic encephalopathy post cardiac arrest.  The patient is being 

considered for gift of life.  Family has consented.  She remains on IV Keppra 

and Dilantin.  Dilantin was started yesterday by neurology..  She was on 

hypertonic saline and this was discontinued by neurology.  Meanwhile, the 

patient remains on a mechanical ventilator, assist control mode with a rate of 

20, tidal volume of 375, FiO2 of 50% with a PEEP of 5.  Chest x-ray from today 

shows left basilar atelectasis.  Orotracheal tube is in a good location.  The 

patient has a pacer/AICD over the left anterior chest area.  No significant 

abnormalities noted on today's chest x-ray.  The blood gas from today shows a pH

of 7.4 with a pCO2 of 39 and pO2 of 91.  She is afebrile.  She was having back 

and forth fever and this morning she is afebrile.  The electrolytes are all norm

al.  Sodium is at 144 with a BUN of 22 and a creatinine of 0.4.  Blood sugars at

182.  LFTs are normal.  UA is negative.  Hemodynamically stable.  No pressors 

for now.  Cardiac rhythm is sinus.  She is receiving enteral feeding for 

nutritional support and she is currently on vital high-protein at the rate of 35

mL an hour.  The patient remains on IV Zosyn as an empiric antibiotic coverage. 

The patient remains on Eliquis 5 mg by mouth twice a day.





On 04/21/2023, the patient remains unresponsive.  She is currently off propofol 

and she doesn't show any signs of neurologic recovery.  She is deeply comatose 

at this point in time.  Her EEG of the brain was repeated yesterday and it 

showed severe abnormal EEG due to severely suppressed background in the 

bilateral hemispheres consistent with diffuse and global encephalopathy.  This 

is consistent with anoxic encephalopathy.  Meanwhile, no seizure activity was 

noted.  The patient remains on a combination of Keppra and Dilantin and 

neurology is on the case.  Hypertonic saline has been discontinued.  Sodium 

remains slightly elevated and based on the electrolytes today, the sodium level 

is at 147, with a chloride of 118.  BUN is at 20 with a creatinine of 0.4.  She 

remains on a mechanical ventilator.  Assist-control mode at the rate of 20, 

tidal volume of 375, FiO2 of 50% with a PEEP of 5.  Chest x-ray shows 

atelectatic change in lung bases more so on the right.  Adequate oxygenation on 

the blood gas with a pO2 of 105.  PH is at 7.4 with a pCO2 of 40.  Rest of the 

electrodes are normal with a BUN of 20 and a creatinine of 0.49.  The white cell

cause of 5.6 with a hemoglobin of 8.5.  She is afebrile.  She is on empiric 

antibiotic coverage with IV Zosyn.  Her cardiac rhythm remains sinus with 

occasional pacing.  She remains on the coagulation with Eliquis.  She is 

receiving enteral feeding for nutritional support.  She is currently on 0.9 

saline at [facility].  Family has opted to proceed with gift of life.





Objective





- Vital Signs


Vital signs: 


                                   Vital Signs











Temp  98 F   04/21/23 08:30


 


Pulse  70   04/21/23 09:00


 


Resp  22   04/21/23 09:00


 


BP  118/52   04/21/23 09:00


 


Pulse Ox  96   04/21/23 09:00


 


FiO2  50   04/21/23 08:30








                                 Intake & Output











 04/20/23 04/21/23 04/21/23





 18:59 06:59 18:59


 


Intake Total 999.208 867.171 186


 


Output Total 495 505 90


 


Balance 504.208 362.171 96


 


Weight  93.4 kg 


 


Intake:   


 


   789 86


 


    .9NS KVO   80


 


    0.9NS Pressure Bag   6


 


    KVO and Pressure Bag 253 589 


 


    Phenytoin Sodium Inj 200 36  





    mg In Sodium Chloride 0.9   





    % 36 ml @ 80 mls/hr IVPB   





    Q12HR MIKHAIL Rx#:[id number]   


 


    Piperacillin-Tazobactam 3  100 





    .375 gm In Sodium   





    Chloride 0.9% 100 ml @ 25   





    mls/hr IVPB Q8H MIKHAIL Rx#:   





    [id number]   


 


    levETIRAcetam IV 1,500 mg 100 100 





    In Saline 1 100ml.bag @   





    400 mls/hr IVPB Q12HR MIKHAIL   





    Rx#:[id number]   


 


  Intake, IV Titration 130.208 8.171 





  Amount   


 


    Norepinephrine 32 mg In  2.334 





    Sodium Chloride 0.9% 218   





    ml @ 0.03 MCG/KG/MIN 1.   





    148 mls/hr IV .Q24H MIKHAIL   





    Rx#:[id number]   


 


    Piperacillin-Tazobactam 3 100  





    .375 gm In Sodium   





    Chloride 0.9% 100 ml @ 25   





    mls/hr IVPB Q8H MIKHAIL Rx#:   





    [id number]   


 


    propofoL 1,000 mg In 30.208 5.837 





    Empty Bag 1 bag @ 15 MCG/   





    KG/MIN 7.348 mls/hr IV .   





    [id number] MIKHAIL Rx#:[id number]   


 


  Tube Feeding 420 70 70


 


  Other 60  30


 


Output:   


 


  Urine 495 505 90


 


Other:   


 


  Voiding Method Indwelling Catheter Indwelling Catheter 








                       ABP, PAP, CO, CI - Last Documented











Arterial Blood Pressure        121/53

















- Exam








No acute distress, sedated on propofol, with an orally placed endotracheal tube.

 The patient is currently on propofol.  The patient is quite symptomatic since a

mechanical ventilator.





HEENT examination is grossly unremarkable.  





Neck supple.  Full range of motion.  No adenopathy thyromegaly or neck vein 

distention.





Cardiovascular examination reveals regular rhythm rate.  S1-S2 normal.  No S3 or

S4.  No discernible murmur noted.   





Lungs reveal scattered rhonchi.  No wheezes or crackles.  Breath sounds equal.  





Abdomen obese, without bowel sounds.





Extremities are intact.  No cyanosis clubbing or edema.





Skin is without rash or lesion.





Neurologic examination the patient is currently on propofol.  The patient senses

the pain probably more so a reflex.  No facial asymmetry.  Her gaze is up 4 and 

more so to the left.  No nystagmus.  The patient is breathing above the [facility]

hanical ventilator.  The patient has a positive cough and a gag reflex.  The 

patient has a slow pupillary response.  The reflexes are equal and symmetrical 

and diminished bilaterally.  Negative clonus, negative Babinski's.  Neurologic 

exam is unchanged..  The patient continues to have respiratory drive.  She has a

very weak corneal reflex.  The patient is having episodic twitching of the eyes.

 She is currently on a combination of Dilantin. 








- Labs


CBC & Chem 7: 


                                 04/21/23 05:00





                                 04/21/23 05:00


Labs: 


                  Abnormal Lab Results - Last 24 Hours (Table)











  04/20/23 04/20/23 04/20/23 Range/Units





  11:39 16:51 16:57 


 


RBC     (3.80-5.40)  m/uL


 


Hgb     (11.4-16.0)  gm/dL


 


Hct     (34.0-46.0)  %


 


MCV     (80.0-100.0)  fL


 


Lymphocytes #     (1.0-4.8)  k/uL


 


ABG Total CO2     (19-24)  mmol/L


 


ABG O2 Saturation     (94-97)  %


 


Sodium     (137-145)  mmol/L


 


Chloride     ()  mmol/L


 


BUN     (7-17)  mg/dL


 


Creatinine     (0.52-1.04)  mg/dL


 


Glucose     (74-99)  mg/dL


 


POC Glucose (mg/dL)  221 H  180 H   ()  mg/dL


 


Calcium     (8.4-10.2)  mg/dL


 


Magnesium    2.4 H  (1.6-2.3)  mg/dL














  04/20/23 04/21/23 04/21/23 Range/Units





  16:57 00:36 05:00 


 


RBC    2.67 L  (3.80-5.40)  m/uL


 


Hgb    8.5 L  (11.4-16.0)  gm/dL


 


Hct    26.7 L  (34.0-46.0)  %


 


MCV    100.3 H  (80.0-100.0)  fL


 


Lymphocytes #    0.5 L  (1.0-4.8)  k/uL


 


ABG Total CO2     (19-24)  mmol/L


 


ABG O2 Saturation     (94-97)  %


 


Sodium  146 H    (137-145)  mmol/L


 


Chloride  118 H    ()  mmol/L


 


BUN  21 H    (7-17)  mg/dL


 


Creatinine     (0.52-1.04)  mg/dL


 


Glucose  178 H    (74-99)  mg/dL


 


POC Glucose (mg/dL)   167 H   ()  mg/dL


 


Calcium  7.9 L    (8.4-10.2)  mg/dL


 


Magnesium     (1.6-2.3)  mg/dL














  04/21/23 04/21/23 04/21/23 Range/Units





  05:00 05:18 05:45 


 


RBC     (3.80-5.40)  m/uL


 


Hgb     (11.4-16.0)  gm/dL


 


Hct     (34.0-46.0)  %


 


MCV     (80.0-100.0)  fL


 


Lymphocytes #     (1.0-4.8)  k/uL


 


ABG Total CO2    26 H  (19-24)  mmol/L


 


ABG O2 Saturation    97.3 H  (94-97)  %


 


Sodium  147 H    (137-145)  mmol/L


 


Chloride  118 H    ()  mmol/L


 


BUN  20 H    (7-17)  mg/dL


 


Creatinine  0.49 L    (0.52-1.04)  mg/dL


 


Glucose  149 H    (74-99)  mg/dL


 


POC Glucose (mg/dL)   133 H   ()  mg/dL


 


Calcium  7.8 L    (8.4-10.2)  mg/dL


 


Magnesium  2.4 H    (1.6-2.3)  mg/dL








                      Microbiology - Last 24 Hours (Table)











 04/15/23 13:34 Blood Culture - Preliminary





 Blood    No Growth after 120 hours


 


 04/15/23 13:34 Blood Culture - Preliminary





 Blood    No Growth after 120 hours














Assessment and Plan


Plan: 








Status post out-of-hospital cardiopulmonary arrest, with at least 15 minutes of 

downtime, before cardiopulmonary resuscitation and return of spontaneous 

circulation, were accomplished.





Status post intubation and mechanical ventilation for cardiopulmonary arrest, 

04/15/2023.





anoxic brain injury, post cardiac arrest, currently unresponsive.  The patient 

does have brainstem reflexes.  Nevertheless, unresponsive once off sedation.  

Neurologically, the patient shows signs of severe neurologic impairment.  She 

continues to have brainstem reflexes. Repeat EEG to be done today.  Neurology is

on the case.  She continues to show signs of severe anoxic encephalopathy.  EEG 

from 04/20/2023 was noted.  There is severe encephalopathy.  No seizure activity

has been noted.  Neurologically unchanged and the patient is currently off 

propofol.  She remains on a combination of Dilantin and Keppra.





Acute fever post cardiac arrest, the patient is normothermic for now.  The 

patient responded to our hypothermia measures and she is currently afebrile.





Multifocal bilateral pulmonary infiltrates, consider aspiration and the patient 

is currently on IV Zosyn, chest x-ray showed no acute abnormalities on today's 

evaluation.





Systolic heart failure with an ejection fraction of 30-35% and impaired LV.  

Noted the troponins were not elevated





Nondisplaced rib fractures related to CPR, as evidenced on the chest x-ray and 

the CAT scan of the chest





Pacemaker insertion with a paced cardiac rhythm for now





History of atrial fibrillation.  The current cardiac rhythmand the patient is on

anticoagulation with Eliquis





Status post AICD placement.





History of COPD.





History of hypertension.





History of CVA.





Prior history of Sadaf filter placement.





History of anxiety/depression.





Enteral feeding for nutritional support with vital high-protein at the rate of 

35 mL an hour





Plan





This is a case of anoxic encephalopathy, severe post cardiac arrest.  Family is 

insistent gift of life.  Based on that, we are supporting this patient to the 

family and [employer] life is ready for potential organ donation.


Continue vent support, no ventilator changes for today


Essentially no major change in the patient's condition.  No seizure activity 

based on the most recent EEG.  Cardiovascularly stable.  Neurologically impaired

and anoxic encephalopathy.  Brainstem reflex are present.    She is currently on

a combination of Dilantin.  Hypertonic Saline Was Discontinued by Neurology.


She is afebrile this morning


Continue ventilator support, no changes for today


Continue IV Zosyn


Temperature normalized


Discontinue propofol


Neurology follow-up regarding her anoxic encephalopathy


EEG and CAT scan of the brain were noted


Continue anticoagulation with Eliquis


Continue metoprolol 25 mg by mouth twice a day


The pacemaker needs to be evaluated and this will be coordinated with cardiology


Poor prognosis based on the above-mentioned comorbidities


We'll continue to follow.  Physical.  Evaluation was done in more than 30 

minutes.


Possible withdrawal of care and the family is considering that.  Gift of life is

on the case.





Time with Patient: Greater than 30

## 2023-04-21 NOTE — XR
EXAMINATION TYPE: XR chest 1V portable

 

DATE OF EXAM: 4/21/2023 6:36 AM

 

COMPARISON: Chest radiograph from one day prior.

 

TECHNIQUE: XR chest 1V portable Frontal view of the chest.

 

CLINICAL INDICATION:Female, 67 years old with history of Tube placement; 

 

FINDINGS: 

Lungs/Pleura: No evidence of focal consolidation or pneumothorax. Blunting of the costophrenic angles
 is present. 

Pulmonary vascularity: Unremarkable.

Heart/mediastinum: Cardiomediastinal silhouette is unremarkable. Three lead cardiac conduction device
 overlying the left hemithorax with lead tips projecting over the right ventricle, right atrium and c
oronary sinus.

Musculoskeletal: No acute osseous pathology.

 

Other findings: None

 

Lines/Tubes:

Endotracheal tube with distal tip 2.5 cm above the guzman.

Nasogastric tube with its distal tip and side-port projecting under the diaphragm and projecting over
 the gastric lumen.

 

 

IMPRESSION: 

1. Stable support tubes.

2. Low lung volumes with small bilateral pleural effusions.

## 2023-04-22 VITALS — SYSTOLIC BLOOD PRESSURE: 117 MMHG | DIASTOLIC BLOOD PRESSURE: 52 MMHG

## 2023-04-22 LAB
ANION GAP SERPL CALC-SCNC: 4 MMOL/L
BASOPHILS # BLD AUTO: 0 K/UL (ref 0–0.2)
BASOPHILS NFR BLD AUTO: 0 %
BUN SERPL-SCNC: 18 MG/DL (ref 7–17)
CALCIUM SPEC-MCNC: 7.9 MG/DL (ref 8.4–10.2)
CHLORIDE SERPL-SCNC: 115 MMOL/L (ref 98–107)
CO2 BLDA-SCNC: 27 MMOL/L (ref 19–24)
CO2 SERPL-SCNC: 26 MMOL/L (ref 22–30)
EOSINOPHIL # BLD AUTO: 0.1 K/UL (ref 0–0.7)
EOSINOPHIL NFR BLD AUTO: 1 %
ERYTHROCYTE [DISTWIDTH] IN BLOOD BY AUTOMATED COUNT: 2.89 M/UL (ref 3.8–5.4)
ERYTHROCYTE [DISTWIDTH] IN BLOOD: 14.6 % (ref 11.5–15.5)
GLUCOSE BLD-MCNC: 143 MG/DL (ref 70–110)
GLUCOSE BLD-MCNC: 152 MG/DL (ref 70–110)
GLUCOSE BLD-MCNC: 152 MG/DL (ref 70–110)
GLUCOSE BLD-MCNC: 172 MG/DL (ref 70–110)
GLUCOSE BLD-MCNC: 177 MG/DL (ref 70–110)
GLUCOSE BLD-MCNC: 186 MG/DL (ref 70–110)
GLUCOSE SERPL-MCNC: 192 MG/DL (ref 74–99)
HCO3 BLDA-SCNC: 26 MMOL/L (ref 21–25)
HCT VFR BLD AUTO: 29.7 % (ref 34–46)
HGB BLD-MCNC: 9.2 GM/DL (ref 11.4–16)
LYMPHOCYTES # SPEC AUTO: 0.7 K/UL (ref 1–4.8)
LYMPHOCYTES NFR SPEC AUTO: 8 %
MAGNESIUM SPEC-SCNC: 2.3 MG/DL (ref 1.6–2.3)
MCH RBC QN AUTO: 31.8 PG (ref 25–35)
MCHC RBC AUTO-ENTMCNC: 31 G/DL (ref 31–37)
MCV RBC AUTO: 102.7 FL (ref 80–100)
MONOCYTES # BLD AUTO: 0.4 K/UL (ref 0–1)
MONOCYTES NFR BLD AUTO: 4 %
NEUTROPHILS # BLD AUTO: 7.3 K/UL (ref 1.3–7.7)
NEUTROPHILS NFR BLD AUTO: 86 %
PCO2 BLDA: 41 MMHG (ref 35–45)
PH BLDA: 7.42 [PH] (ref 7.35–7.45)
PLATELET # BLD AUTO: 193 K/UL (ref 150–450)
PO2 BLDA: 86 MMHG (ref 83–108)
POTASSIUM SERPL-SCNC: 4 MMOL/L (ref 3.5–5.1)
SODIUM SERPL-SCNC: 145 MMOL/L (ref 137–145)
WBC # BLD AUTO: 8.5 K/UL (ref 3.8–10.6)

## 2023-04-22 RX ADMIN — PIPERACILLIN AND TAZOBACTAM SCH MLS/HR: 3; .375 INJECTION, POWDER, FOR SOLUTION INTRAVENOUS at 18:12

## 2023-04-22 RX ADMIN — IPRATROPIUM BROMIDE AND ALBUTEROL SULFATE SCH ML: .5; 3 SOLUTION RESPIRATORY (INHALATION) at 16:12

## 2023-04-22 RX ADMIN — IPRATROPIUM BROMIDE AND ALBUTEROL SULFATE SCH ML: .5; 3 SOLUTION RESPIRATORY (INHALATION) at 12:22

## 2023-04-22 RX ADMIN — INSULIN ASPART SCH UNIT: 100 INJECTION, SOLUTION INTRAVENOUS; SUBCUTANEOUS at 12:02

## 2023-04-22 RX ADMIN — LEVETIRACETAM SCH MLS/HR: 15 INJECTION INTRAVENOUS at 21:15

## 2023-04-22 RX ADMIN — CHLORHEXIDINE GLUCONATE SCH ML: 1.2 RINSE ORAL at 09:25

## 2023-04-22 RX ADMIN — PIPERACILLIN AND TAZOBACTAM SCH MLS/HR: 3; .375 INJECTION, POWDER, FOR SOLUTION INTRAVENOUS at 09:25

## 2023-04-22 RX ADMIN — METOPROLOL TARTRATE SCH MG: 25 TABLET, FILM COATED ORAL at 21:14

## 2023-04-22 RX ADMIN — MIDAZOLAM SCH MLS/HR: 5 INJECTION INTRAMUSCULAR; INTRAVENOUS at 00:53

## 2023-04-22 RX ADMIN — METOPROLOL TARTRATE SCH MG: 25 TABLET, FILM COATED ORAL at 09:25

## 2023-04-22 RX ADMIN — IPRATROPIUM BROMIDE AND ALBUTEROL SULFATE SCH ML: .5; 3 SOLUTION RESPIRATORY (INHALATION) at 02:48

## 2023-04-22 RX ADMIN — IPRATROPIUM BROMIDE AND ALBUTEROL SULFATE SCH ML: .5; 3 SOLUTION RESPIRATORY (INHALATION) at 23:44

## 2023-04-22 RX ADMIN — ATORVASTATIN CALCIUM SCH MG: 80 TABLET, FILM COATED ORAL at 09:25

## 2023-04-22 RX ADMIN — NOREPINEPHRINE BITARTRATE SCH: 1 INJECTION, SOLUTION, CONCENTRATE INTRAVENOUS at 12:04

## 2023-04-22 RX ADMIN — IPRATROPIUM BROMIDE AND ALBUTEROL SULFATE SCH ML: .5; 3 SOLUTION RESPIRATORY (INHALATION) at 20:28

## 2023-04-22 RX ADMIN — LEVETIRACETAM SCH MLS/HR: 15 INJECTION INTRAVENOUS at 09:17

## 2023-04-22 RX ADMIN — PANTOPRAZOLE SODIUM SCH MG: 40 INJECTION, POWDER, FOR SOLUTION INTRAVENOUS at 09:25

## 2023-04-22 RX ADMIN — IPRATROPIUM BROMIDE AND ALBUTEROL SULFATE SCH ML: .5; 3 SOLUTION RESPIRATORY (INHALATION) at 08:27

## 2023-04-22 RX ADMIN — NICARDIPINE HYDROCHLORIDE SCH MLS/HR: 2.5 INJECTION INTRAVENOUS at 21:15

## 2023-04-22 RX ADMIN — INSULIN ASPART SCH UNIT: 100 INJECTION, SOLUTION INTRAVENOUS; SUBCUTANEOUS at 06:27

## 2023-04-22 RX ADMIN — APIXABAN SCH MG: 5 TABLET, FILM COATED ORAL at 21:14

## 2023-04-22 RX ADMIN — PHENYTOIN SODIUM SCH MLS/HR: 50 INJECTION INTRAMUSCULAR; INTRAVENOUS at 11:59

## 2023-04-22 RX ADMIN — PIPERACILLIN AND TAZOBACTAM SCH MLS/HR: 3; .375 INJECTION, POWDER, FOR SOLUTION INTRAVENOUS at 04:05

## 2023-04-22 RX ADMIN — CHLORHEXIDINE GLUCONATE SCH ML: 1.2 RINSE ORAL at 21:14

## 2023-04-22 RX ADMIN — INSULIN ASPART SCH UNIT: 100 INJECTION, SOLUTION INTRAVENOUS; SUBCUTANEOUS at 18:12

## 2023-04-22 RX ADMIN — MIDAZOLAM SCH: 5 INJECTION INTRAMUSCULAR; INTRAVENOUS at 23:34

## 2023-04-22 RX ADMIN — APIXABAN SCH MG: 5 TABLET, FILM COATED ORAL at 09:25

## 2023-04-22 RX ADMIN — INSULIN ASPART SCH: 100 INJECTION, SOLUTION INTRAVENOUS; SUBCUTANEOUS at 00:58

## 2023-04-22 NOTE — P.PN
Subjective


Progress Note Date: 04/21/23 04/21/2023: Patient was seen for a follow-up.  Patient essentially unchanged.  

Continues to have blinking of the eyelids.  No other seizure-like activity 

noted.  This blinking of the eyelids was not associated with any epileptiform 

activity on the last EEG.  Patient is off sedation since morning.  Patient 

continues to be comatose.





04/20/2023: Patient was seen for a follow-up patient currently on propofol 5 

mcg/kg gram per minute.  Patient had an EEG performed early this morning.  

Sedation was discontinued during EEG.  Patient started blinking, and coughing, 

therefore she was put back on propofol.





04/19/2023: Patient was seen for a follow-up.  Patient's niece was present at 

this time.  Patient essentially unchanged.  No clinical improvement.  Patient is

having some rhythmic eye twitching when the eyes are open, all with painful 

stimuli.  At present patient is on propofol 20 mcg/kg/m.  With sedation holiday,

she was twitching, almost like seizures, therefore she received Ativan earlier.





04/18/2023: Patient currently on propofol 30 mcg/kg/m.  Per nursing note, with 

sedation holiday for about 20 minutes, patient was not following any commands.  

She became tachypneic therefore was given 1 mg of Ativan, and propofol 

restarted.  Her examination continues to be very abnormal as below.  Per nurse 

report, patient's eyes opens and close, but is not awake.  Only sclerae is 

visible.





04/17/2023: Patient initially seen by Dr. Allen Brannon.  Please refer to his note 

for details.





Patient is a 67-year-old female, who came to the hospital with cardiopulmonary 

arrest on 04/15/2023 at 9:37 AM.  The downtime was about 15 minutes.  Patient's 

sister and another relative was present.  Patient's nurse was also present.





Patient continues to be comatose.  She had sedation holiday performed for about 

half an hour at 9:50 AM in which she was not showing any meaningful response.  

At present patient on propofol 15 mcg/kg/m. patient's nurse mentioned that 

patient became tachycardic and blood pressure went up to 180 systolic, therefore

she was placed back on propofol.  No seizure-like activity noted.  Patient 

received Ativan 2 mg last night.





Objective





- Vital Signs


Vital signs: 


                                   Vital Signs











Temp  99.2 F   04/22/23 12:00


 


Pulse  70   04/22/23 12:22


 


Resp  20   04/22/23 12:00


 


BP  113/59   04/22/23 12:00


 


Pulse Ox  96   04/22/23 12:00


 


FiO2  50   04/22/23 12:19








                                 Intake & Output











 04/21/23 04/22/23 04/22/23





 18:59 06:59 18:59


 


Intake Total 0486.883 8460.467 354


 


Output Total 545 635 370


 


Balance 673.286 469.467 -16


 


Weight 93.4 kg  


 


Intake:   


 


   376 354


 


    .9NS  240 200


 


    0.9NS Pressure Bag 36 36 18


 


    Phenytoin Sodium Inj 200 36  36





    mg In Sodium Chloride 0.9   





    % 36 ml @ 80 mls/hr IVPB   





    Q12HR MIKHAIL Rx#:[id number]   


 


    Piperacillin-Tazobactam 3 200  





    .375 gm In Sodium   





    Chloride 0.9% 100 ml @ 25   





    mls/hr IVPB Q8H MIKHAIL Rx#:   





    [id number]   


 


    levETIRAcetam IV 1,500 mg  100 100





    In Saline 1 100ml.bag @   





    400 mls/hr IVPB Q12HR MIKHAIL   





    Rx#:[id number]   


 


  Intake, IV Titration 34.286 1.467 





  Amount   


 


    Midazolam HCl 50 mg In  1.467 





    Sodium Chloride 0.9% 40   





    ml @ 1 MG/HR 1 mls/hr IV   





    .Q24H MIKHAIL Rx#:[id number]   


 


    propofoL 1,000 mg In 34.286  





    Empty Bag 1 bag @ 15 MCG/   





    KG/MIN 7.348 mls/hr IV .   





    [id number] MIKHAIL Rx#:[id number]   


 


  Tube Feeding 572 637 


 


  Other 60 90 


 


Output:   


 


  Urine 545 635 370


 


Other:   


 


  Voiding Method Indwelling Catheter Indwelling Catheter Indwelling Catheter








                       ABP, PAP, CO, CI - Last Documented











Arterial Blood Pressure        121/49

















- Exam





Patient continues to be comatose, with GCS of 3.  Patient not responding to 

calling her name or painful stimuli.





Patient's eyes are closed, but frequently blinking eyes noted.  On manually 

opening the eyelids, patient's gaze was deviated upwards and to the right.  Then

shortly it rolled upwards and to the left.  Then skew deviation of the eyes were

noted, with left eye elevation and right eye depression.  Very abnormal 

extraocular muscles.  Oculocephalics present, corneals absent.  





Patient is slightly breathing over the ventilator, as she is set at rate of 

20/m, and sometimes it goes up to 22/m..  She has a weak gag.  Her pupils are 

equal, round and reacting.   





However no twitching noted at rest of the body otherwise.





- Labs


CBC & Chem 7: 


                                 04/22/23 06:15





                                 04/22/23 06:15


Labs: 


                  Abnormal Lab Results - Last 24 Hours (Table)











  04/21/23 04/21/23 04/22/23 Range/Units





  17:48 17:50 00:56 


 


RBC     (3.80-5.40)  m/uL


 


Hgb     (11.4-16.0)  gm/dL


 


Hct     (34.0-46.0)  %


 


MCV     (80.0-100.0)  fL


 


Lymphocytes #     (1.0-4.8)  k/uL


 


ABG HCO3     (21-25)  mmol/L


 


ABG Total CO2     (19-24)  mmol/L


 


Chloride   116 H   ()  mmol/L


 


BUN   21 H   (7-17)  mg/dL


 


Creatinine     (0.52-1.04)  mg/dL


 


Glucose   180 H   (74-99)  mg/dL


 


POC Glucose (mg/dL)  182 H   186 H  ()  mg/dL


 


Calcium   8.0 L   (8.4-10.2)  mg/dL














  04/22/23 04/22/23 04/22/23 Range/Units





  04:40 06:15 06:15 


 


RBC   2.89 L   (3.80-5.40)  m/uL


 


Hgb   9.2 L   (11.4-16.0)  gm/dL


 


Hct   29.7 L   (34.0-46.0)  %


 


MCV   102.7 H   (80.0-100.0)  fL


 


Lymphocytes #   0.7 L   (1.0-4.8)  k/uL


 


ABG HCO3  26 H    (21-25)  mmol/L


 


ABG Total CO2  27 H    (19-24)  mmol/L


 


Chloride    115 H  ()  mmol/L


 


BUN    18 H  (7-17)  mg/dL


 


Creatinine    0.48 L  (0.52-1.04)  mg/dL


 


Glucose    192 H  (74-99)  mg/dL


 


POC Glucose (mg/dL)     ()  mg/dL


 


Calcium    7.9 L  (8.4-10.2)  mg/dL














  04/22/23 04/22/23 Range/Units





  06:19 11:50 


 


RBC    (3.80-5.40)  m/uL


 


Hgb    (11.4-16.0)  gm/dL


 


Hct    (34.0-46.0)  %


 


MCV    (80.0-100.0)  fL


 


Lymphocytes #    (1.0-4.8)  k/uL


 


ABG HCO3    (21-25)  mmol/L


 


ABG Total CO2    (19-24)  mmol/L


 


Chloride    ()  mmol/L


 


BUN    (7-17)  mg/dL


 


Creatinine    (0.52-1.04)  mg/dL


 


Glucose    (74-99)  mg/dL


 


POC Glucose (mg/dL)  172 H  177 H  ()  mg/dL


 


Calcium    (8.4-10.2)  mg/dL








                      Microbiology - Last 24 Hours (Table)











 04/15/23 13:34 Blood Culture - Final





 Blood    No Growth after 144 hours


 


 04/15/23 13:34 Blood Culture - Final





 Blood    No Growth after 144 hours














Assessment and Plan


Assessment: 





Cardiopulmonary arrest with downtime lasting for 15 minutes.  It appears the 

patient had episode of nonsustained V. tach is seems to happen 1 hour prior to 

the cardiac arrest but per cardiology unsure if true VT or A.fib


Anoxic encephalopathy, with persistent comatose state with GCS of 3.  Patient's 

computed tomography scan showing mild diffuse cerebral edema.  Some effacement 

of the lateral ventricles.  Patient has slightly preserved brainstem reflexes at

this time.  Very abnormal extraocular muscles examination.





Probable seizures due to cardiopulmonary arrest- now resolved.  However patient 

has intermittent rhythmic twitching of the eyelids particularly with sedation 

holiday, suggestive of underlying partial status.  However repeat EEG performed 

today was negative for epileptiform activity.





Cardiomyopathy with ejection fraction of 30-35%


Status post intubation and mechanical ventilation due to cardiopulmonary last


Non-STEMI


Paroxysmal atrial fibrillation


History of stroke


AICD


Hypertension


CAD with prior PCI


History of green filter placement














Plan: 





Patient is past 6 days post cardiac arrest.  No clinical improvement.  Her 

examination is severely abnormal.  Prognosis for meaningful recovery is very 

poor at this time.





Repeat EEG  04/20/2023 was very severely abnormal due to severely suppressed 

background in bihemispheric region, consistent with diffuse, global encepha

lopathy as can be seen with anoxic encephalopathy.  Some eye blink artifacts 

were seen but there was no associated epileptiform activity with it.  No 

electrographic evidence of status epilepticus.  





We will discontinue Dilantin, continue Keppra.  


Based upon the clinical examination, and EEG findings, and her prior CT report, 

no need to repeat CT head.  Family has decided pursuing with gift of life.





EEG 04/16/2023 was abnormal routine EEG due to diffuse suppression, which can be

due to either medication-induced or due to anoxia.  The background slowing is 

suggestive of severe encephalopathy.  Otherwise no focal slowing, or 

epileptiform discharges were seen.


Continue Keppra 1500 mg IV every 12 hours (new during this admission).





CT head performed at 5 AM on 04/17/2023 showed no acute intracranial hemorrhage 

or midline shift.  There is mild diffuse cerebral atrophy redemonstrated.  

Resolved left maxillary acute sinusitis.  Slightly worsening bilateral sphenoid 

acute sinusitis.  On my review, there is definite evidence of at least mild 

generalized cerebral edema, with some effacement of the lateral ventricles.  


 


Will defer management to primary and ICU team.





Discussed with patient's nurse.


Family have decided patient to be a candidate for gift of life, which reportedly

will be pursued on Monday, 04/24/2023.


Neurology will sign off.

## 2023-04-22 NOTE — P.PN
Subjective


Progress Note Date: 04/21/23








Patient is a 67-year-old male with a known history of chronic atrial 

fibrillation on anticoagulation with Eliquis, cardiomyopathy status post ICD 

placement, COPD on home oxygen at 4 L via nasal cannula, hypertension, 

anxiety/depression and history of femoral neck fracture s/p repair in January 2023 was brought to the hospital by EMS status postcardiac arrest.  Patient was 

at [facility] where she was found to have worsening shortness of breath and became 

unresponsive on a motorized scooter..  Patient underwent CPR for about 15 

minutes by EMS and was given 3 doses of epinephrine with return of spontaneous 

circulation and was intubated.  Patient was brought to ER for evaluation.  

Patient was unresponsive and was able to provide any history.


On arrival chest x-ray showed cardiomegaly and mild pulmonary vascular 

congestion.  Correlate with BNP for congestive heart failure.  Right basilar 

patchy airspace opacities may represent pulmonary edema versus infiltrate.  

Endotracheal tube in appropriate position.  Possible NG tube terminating in the 

mid esophagus.


CT head showed no acute intracranial process.  Paranasal sinus disease with air-

fluid level within the left maxillary sinus.  Correlate for acute sinusitis.


Chest CTA showed no evidence of PE.  Bilateral lower lobe and right upper lobe 

consolidation with a scattered multifocal groundglass opacities throughout the 

lungs.  Findings are compatible with pneumonia possibly aspiration.


Multiple acute minimally displaced rib fractures likely related to CPR in the 

setting of cardiac arrest.  Remote to subacute bilateral rib fractures also demo

nstrated.  No pneumothorax.  Cardiomegaly.


Laboratory data showed WBC 9.7 hemoglobin 11.4 platelets 246 and D-dimer 3.1


Initial ABG showed pH of 7.17 PCO2 30 and PO2 75


Sodium 135, potassium 4.3, chloride 104, bicarb is 17 BUN 12 and creatinine 0.76

and blood sugar was 325 and lactic acid 2.9 AST 57 ALT 14 alk phos 73 and 

troponin x1 negative


proBNP 2290


Influenza A B RSV and COVID-19 PCR not detected.





4/16/2023


Patient is in the MICU.  Remains mechanical ventilator.  Off pressor support.


Otherwise patient remains unresponsive.  EEG was ordered and neurology is on 

board.


2D echocardiogram showed ejection fraction 30 to 35% with anterior septal 

hypokinesis.  AICD C interrogation was done.


Cardiology neurology and pulmonary is on board.


Chest x-ray showed persistent right midlung and bibasilar multifocal acute 

infiltrates without edema.  No change from 1 day.


Patient remains on antibiotics Zosyn.  NG tube in place.





4/17/2023





Patient is seen and evaluated and follow-up in the ICU remains on mechanical 

ventilation, FiO2 is 60% with a peep of 5.  Patient is off pressor support 

although continues on as needed Ativan along with IV Keppra and propofol.  

Patient also maintained on IV antibiotics in the form of Zosyn.  Chest x-ray 

today shows stable bilateral interstitial and airspace opacification with 

bilateral small pleural effusions may relate to pulmonary edema or infectious 

etiology.  Blood cultures thus far are negative and sputum culture currently 

pending.  Patient does continue to have low-grade temps 101 early this morning. 

WBC remains within normal limits and pro-calcitonin is 0.49.  Other kidney 

functions within normal limits and blood sugars being monitored.  Per nursing 

staff attempts at weaning were performed although patient became extremely 

tachycardic and restless and not following commands and placed back on sedation.

 An EEG was done although pending at this time.  Repeat CT of the brain early 

this morning shows no acute intracranial hemorrhage or midline shift with mild 

diffuse cerebral atrophy redemonstrated with slightly worsening bilateral spheno

id acute sinusitis.  Multiple medical consultations following an per nursing 

staff [employer] was notified although unsure of family is aware.  Patient was

on the registry.





04/18/2023





Patient is seen and evaluated in the ICU maintained on mechanical ventilation 

with an FiO2 of 55% and PEEP is 5.  Patient is maintained on IV antibiotics and 

continues to have fevers and cultures thus far been negative.  Patient is 

continued on sedation and off pressor support and remains on hypertonic solution

.  Neurology following as well as pulmonary intensivist with overall poor 

prognosis.  Per nursing staff, family has been discussing possible comfort care 

although not ready to wean at this time.  Gift of life also following as patient

is on the registry evaluating the patient.





04/19/2023





Patient continues to be in the ICU on mechanical ventilation with an FiO2 of 50%

and PEEP is 5.  Multiple medical consultations including cardiology, pulmonary 

intensivist, neurology following.  Patient had a CT of the chest abdomen pelvis 

per gift of life services which confirmed bilateral rib fractures involving at 

least 2 ribs through 10 bilaterally with no evidence of acute abdominal process 

and consolidation changes in the lung bases likely secondary to atelectasis and 

hypoventilatory.  Patient is maintained on hypertonic solution along with IV 

Keppra with neurology following and patient remains clinically the same.  No 

further seizure-like activity noted although does continue with jerking and 

twitching type movements once removed from sedation.  Patient is undergoing 

occasional sedation holidays.  Gift of life is following as patient is on the 

registry although has not approached family as of yet and family is considering 

terminale weaning and comfort measures.  Cardiology signed off and will follow 

as needed.  Overall prognosis is poor as neurological status continues to be 

unchanged.





04/20/2023





Patient is seen in follow-up today continues to be on mechanical ventilation 

with an FiO2 of 50% and PEEP is 5.  Patient does continue on sedation along with

IV Keppra and empiric Zosyn and not requiring pressors.  Patient's sodium is 

elevated today at 150 and has been on hypertonic solution that was just 

discontinued and will follow-up with repeat sodium level.  Possible D5 in water.

 Gift of life is following as patient is on the registry and family is agreeable

discussing possible gift of life this Sunday as they are currently awaiting more

family members.  Repeat EEG was done today and continues to show severely 

abnormal EEG due to severely suppressed background and consistent with diffuse 

global encephalopathy. Not following any commands with sedation holidays.  Chest

x-ray today shows bilateral pleural effusions although stable. Patient is 

continuing to have fevers today.





04/21/2023


Patient is seen in follow-up today continues in the ICU for mechanical 

ventilation with no changes to prevent settings.  Patient is a gift of life 

donor and they are following possibly working on pronation this Monday while 

awaiting additional family members and or time.  Patient is currently maintained

on IV antibiotics and per nursing staff did require a small dose of Levophed for

approximately a half hour this morning although blood pressure is maintained at 

this time.  Patient is afebrile and maintained on IV antibiotics in the form of 

Zosyn.





Review of systems: Unable to obtain as patient is intubated and on sedation








PHYSICAL EXAMINATION: 


Patient is on mechanical ventilator.  FiO2 is currently 50% with a PEEP of 5


HEENT: Normocephalic. Neck is supple. Pupils reactive. Nostrils clear. Oral 

cavity is moist. 


Neck reveals no JVD, carotid bruits, or thyromegaly. 


CHEST EXAMINATION: Trachea is central. Symmetrical expansion.  Bibasilar 

diminished sounds and coarse breath sounds.  No wheezing.


CARDIAC: Normal S1, S2 with no gallops. No murmurs 


ABDOMEN: Soft. Bowel sounds present.  Nontender.  No organomegaly. No abdominal 

bruits. 


Extremities: Trace lower extremity edema.  No clubbing or cyanosis


Neurologically patient is on mechanical ventilator.  Currently on sedation


Skin: No rash or skin lesions. 


Psychiatric: Could not be assessed at this time


Musculoskeletal: No joint swelling or deformity.





Assessment:





Acute cardiac arrest s/p CPR for about 15 minutes with return of spontaneous 

circulation.  Status post intubation by EMS.


Episodes of nonsustained V. tach prior to cardiac arrest which is most likely 

the cause although awaiting full AICD interrogation report


Possible anoxic brain injury with severe encephalopathy noted an EEG


Chronic atrial fibrillation on anticoagulant Eliquis


History of ICD placement


Hyperglycemia


COPD history 


Chronic hypoxic respiratory failure secondary to COPD on 4 L oxygen via nasal 

cannula


Recent history of femoral neck fracture repair in January 2023


History of IVC filter placement


Anxiety/depression history


Full code while awaiting gift of life





Plan:





Patient is currently in the MICU and remains on mechanical ventilation with an 

FiO2 of 50% and PEEP is 5.  Status post CPR and currently on mechanical ventila

tor.  Downtime was was about 15 minutes.  Possible anoxic brain injury. EEG 

repeated today remains with severe encephalopathy maintained on IV Keppra with 

neurology following.  


Continue with antibiotics for pneumonia with Zosyn.  


Continue during Accu-Cheks and continue with insulin sliding scale.


Patient is off pressor support although per nursing staff patient did require 

low-dose Levophed for approximately half hour this morning


Multiple medical consultations following


Given the downtime and severity of neurological assessment and cardiac arrest, 

overall prognosis remains poor. Gift of life also following and evaluating this 

patient is on registry


Family is agreeable with gift of life and donation may possibly take place on 

Monday when there is aware time available and when more family members are 

present








The impression and plan of care has been dictated by Myrtle Hyman, Nurse 

Practitioner as directed.





Dr. Chris MD


I have performed a history and examination and MDM of this patient, discussed 

the same with the dictator, and  agree with the dictator's assessment and plan 

as written ,documented as a scribe. Based on total visit time,  I have performed

more than 50% of the visit.  





Objective





- Vital Signs


Vital signs: 


                                   Vital Signs











Temp  98 F   04/21/23 08:30


 


Pulse  70   04/21/23 09:00


 


Resp  22   04/21/23 09:00


 


BP  118/52   04/21/23 09:00


 


Pulse Ox  96   04/21/23 09:00


 


FiO2  50   04/21/23 08:30








                                 Intake & Output











 04/20/23 04/21/23 04/21/23





 18:59 06:59 18:59


 


Intake Total 999.208 867.171 186


 


Output Total 495 505 90


 


Balance 504.208 362.171 96


 


Weight  93.4 kg 


 


Intake:   


 


   789 86


 


    .9NS KVO   80


 


    0.9NS Pressure Bag   6


 


    KVO and Pressure Bag 253 589 


 


    Phenytoin Sodium Inj 200 36  





    mg In Sodium Chloride 0.9   





    % 36 ml @ 80 mls/hr IVPB   





    Q12HR MIKHAIL Rx#:[id number]   


 


    Piperacillin-Tazobactam 3  100 





    .375 gm In Sodium   





    Chloride 0.9% 100 ml @ 25   





    mls/hr IVPB Q8H MIKHAIL Rx#:   





    [id number]   


 


    levETIRAcetam IV 1,500 mg 100 100 





    In Saline 1 100ml.bag @   





    400 mls/hr IVPB Q12HR MIKHAIL   





    Rx#:[id number]   


 


  Intake, IV Titration 130.208 8.171 





  Amount   


 


    Norepinephrine 32 mg In  2.334 





    Sodium Chloride 0.9% 218   





    ml @ 0.03 MCG/KG/MIN 1.   





    148 mls/hr IV .Q24H MIKHAIL   





    Rx#:[id number]   


 


    Piperacillin-Tazobactam 3 100  





    .375 gm In Sodium   





    Chloride 0.9% 100 ml @ 25   





    mls/hr IVPB Q8H MIKHAIL Rx#:   





    [id number]   


 


    propofoL 1,000 mg In 30.208 5.837 





    Empty Bag 1 bag @ 15 MCG/   





    KG/MIN 7.348 mls/hr IV .   





    [id number] MIKHAIL Rx#:[id number]   


 


  Tube Feeding 420 70 70


 


  Other 60  30


 


Output:   


 


  Urine 495 505 90


 


Other:   


 


  Voiding Method Indwelling Catheter Indwelling Catheter 








                       ABP, PAP, CO, CI - Last Documented











Arterial Blood Pressure        121/53

















- Labs


CBC & Chem 7: 


                                 04/21/23 05:00





                                 04/21/23 17:50


Labs: 


                  Abnormal Lab Results - Last 24 Hours (Table)











  04/20/23 04/20/23 04/20/23 Range/Units





  11:39 16:51 16:57 


 


RBC     (3.80-5.40)  m/uL


 


Hgb     (11.4-16.0)  gm/dL


 


Hct     (34.0-46.0)  %


 


MCV     (80.0-100.0)  fL


 


Lymphocytes #     (1.0-4.8)  k/uL


 


ABG Total CO2     (19-24)  mmol/L


 


ABG O2 Saturation     (94-97)  %


 


Sodium     (137-145)  mmol/L


 


Chloride     ()  mmol/L


 


BUN     (7-17)  mg/dL


 


Creatinine     (0.52-1.04)  mg/dL


 


Glucose     (74-99)  mg/dL


 


POC Glucose (mg/dL)  221 H  180 H   ()  mg/dL


 


Calcium     (8.4-10.2)  mg/dL


 


Magnesium    2.4 H  (1.6-2.3)  mg/dL














  04/20/23 04/21/23 04/21/23 Range/Units





  16:57 00:36 05:00 


 


RBC    2.67 L  (3.80-5.40)  m/uL


 


Hgb    8.5 L  (11.4-16.0)  gm/dL


 


Hct    26.7 L  (34.0-46.0)  %


 


MCV    100.3 H  (80.0-100.0)  fL


 


Lymphocytes #    0.5 L  (1.0-4.8)  k/uL


 


ABG Total CO2     (19-24)  mmol/L


 


ABG O2 Saturation     (94-97)  %


 


Sodium  146 H    (137-145)  mmol/L


 


Chloride  118 H    ()  mmol/L


 


BUN  21 H    (7-17)  mg/dL


 


Creatinine     (0.52-1.04)  mg/dL


 


Glucose  178 H    (74-99)  mg/dL


 


POC Glucose (mg/dL)   167 H   ()  mg/dL


 


Calcium  7.9 L    (8.4-10.2)  mg/dL


 


Magnesium     (1.6-2.3)  mg/dL














  04/21/23 04/21/23 04/21/23 Range/Units





  05:00 05:18 05:45 


 


RBC     (3.80-5.40)  m/uL


 


Hgb     (11.4-16.0)  gm/dL


 


Hct     (34.0-46.0)  %


 


MCV     (80.0-100.0)  fL


 


Lymphocytes #     (1.0-4.8)  k/uL


 


ABG Total CO2    26 H  (19-24)  mmol/L


 


ABG O2 Saturation    97.3 H  (94-97)  %


 


Sodium  147 H    (137-145)  mmol/L


 


Chloride  118 H    ()  mmol/L


 


BUN  20 H    (7-17)  mg/dL


 


Creatinine  0.49 L    (0.52-1.04)  mg/dL


 


Glucose  149 H    (74-99)  mg/dL


 


POC Glucose (mg/dL)   133 H   ()  mg/dL


 


Calcium  7.8 L    (8.4-10.2)  mg/dL


 


Magnesium  2.4 H    (1.6-2.3)  mg/dL








                      Microbiology - Last 24 Hours (Table)











 04/15/23 13:34 Blood Culture - Preliminary





 Blood    No Growth after 120 hours


 


 04/15/23 13:34 Blood Culture - Preliminary





 Blood    No Growth after 120 hours

## 2023-04-22 NOTE — P.PN
Subjective


Progress Note Date: 04/22/23








67-year-old female who apparently had a cardiopulmonary arrest, at a local 

department store/[employer]pres[employer].  The patient apparently had a downtime of about 15 

minutes, for there was cardiopulmonary resuscitation and return of spontaneous 

circulation.  The patient was seen in the ER, by the ER physician, Dr. Pennington.  

The patient was vented, and a right femoral vein triple lumen catheter was 

placed.  Family members are in the room, we selected see the patient.  The 

patient herself is unresponsive.  She has a orally placed endotracheal tube.  

She's currently on the ventilator, with vent settings of volume assist control, 

rate 20, tidal volume 375, FiO2 100%, 5.  Blood gases show pO2 75, pCO2 of 53, 

and a pH is 7.17.  The patient is on norepinephrine at 0.15 mcg/kg/m, and 

propofol at 15 mcg/kg/m.  The patient has a history of COPD from secondhand 

tobacco exposure, a previous history of the fibrillator placement for 

cardiomyopathy, and history of stroke, and Sadaf filter placement.  In 

addition, the patient is on home O2, that she is supposed to use all the time, 

but she only uses as needed.  She was a smoker in the distant past.  White count

9.7, hemoglobin 11.4, hematocrit 37.9, and platelet count was normal.  D-dimer 

was 3.10.  Sodium 137, potassium 4.3, chlorides 104, CO2 17, anion gap 16, BUN 

and creatinine 12 and 0.76.  Troponin was 0.022 and N-terminal proBNP was 2290. 

Lactate was not measured but is probably elevated.  Testing for influenza A, and

B, RSV, and coronavirus are all negative.  Chest x-ray shows evidence of 

cardiomegaly, fluid overload, and an appropriately placed endotracheal tube.  

Computed tomography scan of the brain showed nothing acute.  CT angiogram was 

negative for PE.  There also may be some right lower lobe consolidation 

consistent with prior aspiration.





Progress note dated 04/16/2023.





67-year-old female who had an out-of-hospital cardiopulmonary arrest.  The 

patient had about 15 minutes of resuscitation before there was return of 

spontaneous circulation.  Unfortunately, the patient may have sustained anoxic 

brain injury.  Today, we place an art line.  She remains on the ventilator.  I 

did have neurology see her.  She is on volume assist control, rate 20, tidal 

volume 375, FiO2 60%, PEEP of 5.  Arterial blood gases show pO2 of 90, pCO2 34, 

and pH is 7.35.  The patient is on norepinephrine at 0.05 mcg/kg/m, propofol at 

40 mcg/kg/m, and saline at 75 mL an hour.  We will discontinue the Levaquin and 

Flagyl and start Zosyn.  In addition we'll start some tube feeds, at 10 mL an 

hour.  A right radial art line will be placed today.  White count 8.9, 

hemoglobin 10.8, hematocrit 34.1, and platelet count is normal.  Sodium 139, 

potassium 3.9, chlorides 112, CO2 18, BUN 19, creatinine 0.71.  Troponins were 

0.086 and 0.091.  Chest x-ray shows persistent bilateral infiltrates, more so in

the right lung than on the left.  This may relate to aspiration.








On today's evaluation of 04/18/2023, seeing the patient for a follow-up.  This 

is a case of a cardiac arrest with at least 15 minutes downtime.  The patient 

has an AICD in place.  Initial cardiac rhythm is not clear.  The did not have 

any pacemaker discharges and the pacemaker has not been interrogated yet.  Noted

the patient has a pacer AICD in place.  Her current cardiac rhythm is paced and 

her rate is currently at 76.  Hemodynamically, the patient is stable on no 

pressors.  The patient is maintaining her own blood pressure.  Meanwhile, the 

patient's troponins did not rise and the troponin peaked at 0.09.  The patient 

currently has signs of anoxic encephalopathy.  Attempts to wean her off the 

sedation yesterday feel that the patient became quite hypertensive and 

tachypneic and she did not show any reasonable neurological recovery.  Neurology

is on the case for the potential anoxic encephalopathy.  CAT scan of the brain 

was done on 04/15/2023 and 04/17/2023 and there is mild diffuse cerebral atrophy

without any acute brain changes.  There is bilateral sphenoid sinus disease.  

EEG was also done on 04/16/2023 indicating diffuse suppression due to anoxia.  

The patient is currently on propofol which is running at the rate of 15 

mcg/kg/m.  The patient is also on IV Keppra 1.5 g every 12 hours.  This morning,

the patient remains on a mechanical ventilator patient is on assist control mode

at the rate of 20, tidal volume of 375, FiO2 of 50% with a PEEP of 5.  The blood

gas shows a pH of 7.42 with a pCO2 of 34 and pO2 112.  Sodium is at 141 with a 

potassium level of 4.4, BUN is at 17 with a creatinine of 0.59.  WBC count at 

7.2 with a hemoglobin 9.3 and a platelet count of 163.  The chest x-ray from 

this morning is showing cardiomegaly along with bilateral pleural effusion.  ET 

tube is in a good location.  The patient also has a orogastric tube in place.  A

CT angiogram of the chest that was done on 04/15/2023 showed bilateral lower 

lobe and upper lobe consolidation and scattered areas of multifocal groundglass 

opacities throughout the lung.  Possibility of aspiration was considered in 

addition to underlying cardiomegaly and multiple acute minimally displaced 

anterior rib fractures in the setting of CPR.  IV fluids are currently at the 

rate of 25 mL an hour of 3% hypertonic saline.  This was initiated by neurology 

regarding the potential of CNS swelling.  As mentioned, the sodium level is at 

141 from this morning.  No seizure activity has been noted.Patient is currently 

on antibiotic without liquids 5 mg by mouth twice a day.  The patient is on met

oprolol 25 mg by mouth twice a day.  The patient is on Zestril 5 mg by mouth 

twice a day.  The patient is also on empiric antibiotic coverage with IV Zosyn. 

The echocardiogram showed an ejection fraction of 30-35% with global LV 

dysfunction.  There was mild aortic stenosis.





On today's evaluation of 04/19/2023, the patient remains neurologically impaired

and and anoxic encephalopathy.  The patient is post cardiac arrest.  The patient

is currently intubated on a mechanical ventilator.  Propofol is running at 20 

mcg/kg/m.  This is to maintain synchrony with a mechanical ventilator.  The 

patient is receiving daily sedation holidays.  She remains on a mechanical 

ventilator.  This morning, she is on assist-control mode at the rate of 20, 

tidal volume of 375, FiO2 of 50% with a PEEP of 5.  The blood gas from today 

shows a pH of 7.39 with a pCO2 of 36 and the pO2 is currently at 87.  The chest 

x-ray from today shows elevation of the right hemidiaphragm, a pacemaker is 

still present in the left anterior chest.  No significant airspace disease or 

pulmonary infiltrates.  Orogastric tube is in a good location.  Meanwhile, the 

patient carries a WBC count of 5.8 with a hemoglobin of 8.6 and a platelet count

of 151.  BUN is 18 with a creatinine of 0.5 and a sodium level is at 146.  UA is

negative.  Give a flight has been contacted.  As part of their workup, a CAT 

scan of the chest abdomen and pelvis was done.  There is left-sided rib 

fractures probably related to CPR.  No acute intra-abdominal abnormalities.  

Some consolidation changes in the lung bases are seen probably related to atel

ectasis.  Unfortunately, neurologically, the patient continues to be 

significantly impaired.  No reasonable neurological recovery while off propofol.

 No seizure activity and the patient remains on Keppra.  The patient is also 

receiving enteral feeding for nutritional support and she is currently on vital 

AF.  The patient remains on the hypertonic saline per neurology's 

recommendation.  The patient remains on Keppra.  The patient is on IV Zosyn.  

Her anticoagulation has been resumed she remains on Eliquis 5 mg by mouth twice 

a day.





On today's evaluation of 04/20/2023, the patient is intubated on a mechanical 

ventilator, she remains on propofol running at 10 mcg/kg/m.  Deeply comatose and

unresponsive.  Earlier this morning, twitching of the eyes was also noted and 

she is a possible undergoing an EEG today.  As mentioned, she is a case of 

severe anoxic encephalopathy post cardiac arrest.  The patient is being 

considered for gift of life.  Family has consented.  She remains on IV Keppra 

and Dilantin.  Dilantin was started yesterday by neurology..  She was on 

hypertonic saline and this was discontinued by neurology.  Meanwhile, the 

patient remains on a mechanical ventilator, assist control mode with a rate of 

20, tidal volume of 375, FiO2 of 50% with a PEEP of 5.  Chest x-ray from today 

shows left basilar atelectasis.  Orotracheal tube is in a good location.  The 

patient has a pacer/AICD over the left anterior chest area.  No significant 

abnormalities noted on today's chest x-ray.  The blood gas from today shows a pH

of 7.4 with a pCO2 of 39 and pO2 of 91.  She is afebrile.  She was having back 

and forth fever and this morning she is afebrile.  The electrolytes are all norm

al.  Sodium is at 144 with a BUN of 22 and a creatinine of 0.4.  Blood sugars at

182.  LFTs are normal.  UA is negative.  Hemodynamically stable.  No pressors 

for now.  Cardiac rhythm is sinus.  She is receiving enteral feeding for 

nutritional support and she is currently on vital high-protein at the rate of 35

mL an hour.  The patient remains on IV Zosyn as an empiric antibiotic coverage. 

The patient remains on Eliquis 5 mg by mouth twice a day.





On 04/21/2023, the patient remains unresponsive.  She is currently off propofol 

and she doesn't show any signs of neurologic recovery.  She is deeply comatose 

at this point in time.  Her EEG of the brain was repeated yesterday and it 

showed severe abnormal EEG due to severely suppressed background in the 

bilateral hemispheres consistent with diffuse and global encephalopathy.  This 

is consistent with anoxic encephalopathy.  Meanwhile, no seizure activity was 

noted.  The patient remains on a combination of Keppra and Dilantin and 

neurology is on the case.  Hypertonic saline has been discontinued.  Sodium 

remains slightly elevated and based on the electrolytes today, the sodium level 

is at 147, with a chloride of 118.  BUN is at 20 with a creatinine of 0.4.  She 

remains on a mechanical ventilator.  Assist-control mode at the rate of 20, 

tidal volume of 375, FiO2 of 50% with a PEEP of 5.  Chest x-ray shows 

atelectatic change in lung bases more so on the right.  Adequate oxygenation on 

the blood gas with a pO2 of 105.  PH is at 7.4 with a pCO2 of 40.  Rest of the 

electrodes are normal with a BUN of 20 and a creatinine of 0.49.  The white cell

cause of 5.6 with a hemoglobin of 8.5.  She is afebrile.  She is on empiric 

antibiotic coverage with IV Zosyn.  Her cardiac rhythm remains sinus with 

occasional pacing.  She remains on the coagulation with Eliquis.  She is 

receiving enteral feeding for nutritional support.  She is currently on 0.9 

saline at [facility].  Family has opted to proceed with gift of life.





04/22/2023, patient remains neurologically impaired and unchanged compared to 

yesterday, comatose and unresponsive.  She remains off sedation.  EEG showed 

diffuse suppression consistent with anoxic encephalopathy.  Awaiting gift of 

life and potential organ donation.  Awaiting also more family members to arrive 

prior to proceeding with potential organ donation.  She remains on assist-

control mode and the patient is on assist-control mode rate of 20, tidal volume 

of 375, FiO2 50% with a PEEP of 5.  PH is at 7.42 with a pCO2 of 41 and pO2 of 

86.  Electrolytes are all stable.  Creatinine is stable.  CBC is unremarkable 

with a stable hemoglobin of 9.2 and a white cell count of 8.5.  Hemodynamically 

stable and the patient remains in a normal sinus rhythm.  No pressors for now.  

She is receiving enteral feeding for nutritional support and she remains on 

vital high-protein.  This is running at the rate of 49 mL an hour.  She remains 

on Keppra and Dilantin.  She remains on NovoLog sinus care coverage.  She 

remains on anticoagulation with Eliquis.





Objective





- Vital Signs


Vital signs: 


                                   Vital Signs











Temp  98.5 F   04/22/23 08:00


 


Pulse  71   04/22/23 09:00


 


Resp  22   04/22/23 09:00


 


BP  118/52   04/22/23 09:00


 


Pulse Ox  97   04/22/23 09:00


 


FiO2  50   04/22/23 08:36








                                 Intake & Output











 04/21/23 04/22/23 04/22/23





 18:59 06:59 18:59


 


Intake Total 1164.569 9913.467 86


 


Output Total 545 635 200


 


Balance 673.286 469.467 -114


 


Weight 93.4 kg  


 


Intake:   


 


   376 86


 


    .9NS  240 80


 


    0.9NS Pressure Bag 36 36 6


 


    Phenytoin Sodium Inj 200 36  





    mg In Sodium Chloride 0.9   





    % 36 ml @ 80 mls/hr IVPB   





    Q12HR MIKHAIL Rx#:[id number]   


 


    Piperacillin-Tazobactam 3 200  





    .375 gm In Sodium   





    Chloride 0.9% 100 ml @ 25   





    mls/hr IVPB Q8H MIKHAIL Rx#:   





    [id number]   


 


    levETIRAcetam IV 1,500 mg  100 





    In Saline 1 100ml.bag @   





    400 mls/hr IVPB Q12HR MIKHAIL   





    Rx#:[id number]   


 


  Intake, IV Titration 34.286 1.467 





  Amount   


 


    Midazolam HCl 50 mg In  1.467 





    Sodium Chloride 0.9% 40   





    ml @ 1 MG/HR 1 mls/hr IV   





    .Q24H MIKHAIL Rx#:[id number]   


 


    propofoL 1,000 mg In 34.286  





    Empty Bag 1 bag @ 15 MCG/   





    KG/MIN 7.348 mls/hr IV .   





    [id number] MIKHAIL Rx#:[id number]   


 


  Tube Feeding 572 637 


 


  Other 60 90 


 


Output:   


 


  Urine 545 635 200


 


Other:   


 


  Voiding Method Indwelling Catheter Indwelling Catheter Indwelling Catheter








                       ABP, PAP, CO, CI - Last Documented











Arterial Blood Pressure        149/64

















- Exam








No acute distress, sedated on propofol, with an orally placed endotracheal tube.

 The patient is currently on propofol.  The patient is quite symptomatic since a

mechanical ventilator.





HEENT examination is grossly unremarkable.  





Neck supple.  Full range of motion.  No adenopathy thyromegaly or neck vein 

distention.





Cardiovascular examination reveals regular rhythm rate.  S1-S2 normal.  No S3 or

S4.  No discernible murmur noted.   





Lungs reveal scattered rhonchi.  No wheezes or crackles.  Breath sounds equal.  





Abdomen obese, without bowel sounds.





Extremities are intact.  No cyanosis clubbing or edema.





Skin is without rash or lesion.





Neurologic examination the patient is currently on propofol.  The patient senses

the pain probably more so a reflex.  No facial asymmetry.  Her gaze is up 4 and 

more so to the left.  No nystagmus.  The patient is breathing above the 

mechanical ventilator.  The patient has a positive cough and a gag reflex.  The 

patient has a slow pupillary response.  The reflexes are equal and symmetrical 

and diminished bilaterally.  Negative clonus, negative Babinski's.  Neurologic 

exam is unchanged..  The patient continues to have respiratory drive.  She has a

very weak corneal reflex.  The patient is having episodic twitching of the eyes.

 She is currently on a combination of Dilantin. 








- Labs


CBC & Chem 7: 


                                 04/22/23 06:15





                                 04/22/23 06:15


Labs: 


                  Abnormal Lab Results - Last 24 Hours (Table)











  04/21/23 04/21/23 04/21/23 Range/Units





  11:31 11:38 17:48 


 


RBC     (3.80-5.40)  m/uL


 


Hgb     (11.4-16.0)  gm/dL


 


Hct     (34.0-46.0)  %


 


MCV     (80.0-100.0)  fL


 


Lymphocytes #     (1.0-4.8)  k/uL


 


ABG HCO3     (21-25)  mmol/L


 


ABG Total CO2     (19-24)  mmol/L


 


Chloride     ()  mmol/L


 


BUN     (7-17)  mg/dL


 


Creatinine     (0.52-1.04)  mg/dL


 


Glucose     (74-99)  mg/dL


 


POC Glucose (mg/dL)  152 H  158 H  182 H  ()  mg/dL


 


Calcium     (8.4-10.2)  mg/dL














  04/21/23 04/22/23 04/22/23 Range/Units





  17:50 00:56 04:40 


 


RBC     (3.80-5.40)  m/uL


 


Hgb     (11.4-16.0)  gm/dL


 


Hct     (34.0-46.0)  %


 


MCV     (80.0-100.0)  fL


 


Lymphocytes #     (1.0-4.8)  k/uL


 


ABG HCO3    26 H  (21-25)  mmol/L


 


ABG Total CO2    27 H  (19-24)  mmol/L


 


Chloride  116 H    ()  mmol/L


 


BUN  21 H    (7-17)  mg/dL


 


Creatinine     (0.52-1.04)  mg/dL


 


Glucose  180 H    (74-99)  mg/dL


 


POC Glucose (mg/dL)   186 H   ()  mg/dL


 


Calcium  8.0 L    (8.4-10.2)  mg/dL














  04/22/23 04/22/23 04/22/23 Range/Units





  06:15 06:15 06:19 


 


RBC  2.89 L    (3.80-5.40)  m/uL


 


Hgb  9.2 L    (11.4-16.0)  gm/dL


 


Hct  29.7 L    (34.0-46.0)  %


 


MCV  102.7 H    (80.0-100.0)  fL


 


Lymphocytes #  0.7 L    (1.0-4.8)  k/uL


 


ABG HCO3     (21-25)  mmol/L


 


ABG Total CO2     (19-24)  mmol/L


 


Chloride   115 H   ()  mmol/L


 


BUN   18 H   (7-17)  mg/dL


 


Creatinine   0.48 L   (0.52-1.04)  mg/dL


 


Glucose   192 H   (74-99)  mg/dL


 


POC Glucose (mg/dL)    172 H  ()  mg/dL


 


Calcium   7.9 L   (8.4-10.2)  mg/dL








                      Microbiology - Last 24 Hours (Table)











 04/15/23 13:34 Blood Culture - Final





 Blood    No Growth after 144 hours


 


 04/15/23 13:34 Blood Culture - Final





 Blood    No Growth after 144 hours














Assessment and Plan


Plan: 








Status post out-of-hospital cardiopulmonary arrest, with at least 15 minutes of 

downtime, before cardiopulmonary resuscitation and return of spontaneous 

circulation, were accomplished.





Status post intubation and mechanical ventilation for cardiopulmonary arrest, 

04/15/2023.





anoxic brain injury, post cardiac arrest, currently unresponsive.  The patient 

does have brainstem reflexes.  Nevertheless, unresponsive once off sedation.  

Neurologically, the patient shows signs of severe neurologic impairment.  She 

continues to have brainstem reflexes. Repeat EEG to be done today.  Neurology is

on the case.  She continues to show signs of severe anoxic encephalopathy.  EEG 

from 04/20/2023 was noted.  There is severe encephalopathy.  No seizure activity

has been noted.  Neurologically unchanged and the patient is currently off 

propofol.  She remains on a combination of Dilantin and Keppra.  Neurologically 

unchanged





Acute fever post cardiac arrest, the patient is normothermic for now.  The 

patient responded to our hypothermia measures and she is currently afebrile.





Multifocal bilateral pulmonary infiltrates, consider aspiration and the patient 

is currently on IV Zosyn, chest x-ray showed no acute abnormalities on today's 

evaluation.





Systolic heart failure with an ejection fraction of 30-35% and impaired LV.  

Noted the troponins were not elevated





Nondisplaced rib fractures related to CPR, as evidenced on the chest x-ray and 

the CAT scan of the chest





Pacemaker insertion with a paced cardiac rhythm for now





History of atrial fibrillation.  The current cardiac rhythm and the patient is 

on anticoagulation with Eliquis





Status post AICD placement.





History of COPD.





History of hypertension.





History of CVA.





Prior history of Wagner filter placement.





History of anxiety/depression.





Enteral feeding for nutritional support with vital high-protein at the rate of 

35 mL an hour





Plan





Neurologically unchanged


No sedation


Completely unresponsive


Treatment essentially supportive pending possibility of gift of life Monday


This is a case of anoxic encephalopathy, severe post cardiac arrest.  Family is 

insistent gift of life.  Based on that, we are supporting this patient to the 

family and gift of life is ready for potential organ donation.


Continue vent support, no ventilator changes for today


She is afebrile this morning


Continue ventilator support, no changes for today


Continue IV Zosyn


Temperature normalized


Neurology follow-up regarding her anoxic encephalopathy


EEG and CAT scan of the brain were noted


Continue anticoagulation with Eliquis


Continue metoprolol 25 mg by mouth twice a day


The pacemaker needs to be evaluated and this will be coordinated with cardiology


Poor prognosis based on the above-mentioned comorbidities


We'll continue to follow.  Physical.  Evaluation was done in more than 30 

minutes.


Possible withdrawal of care and the family is considering that.  Gift of life is

on the case.





Time with Patient: Greater than 30

## 2023-04-23 LAB
ALBUMIN SERPL-MCNC: 2.6 G/DL (ref 3.5–5)
ALBUMIN SERPL-MCNC: 2.7 G/DL (ref 3.5–5)
ALP SERPL-CCNC: 56 U/L (ref 38–126)
ALP SERPL-CCNC: 59 U/L (ref 38–126)
ALT SERPL-CCNC: 40 U/L (ref 4–34)
ALT SERPL-CCNC: 40 U/L (ref 4–34)
ANION GAP SERPL CALC-SCNC: 1 MMOL/L
ANION GAP SERPL CALC-SCNC: 4 MMOL/L
ANION GAP SERPL CALC-SCNC: 5 MMOL/L
AST SERPL-CCNC: 71 U/L (ref 14–36)
AST SERPL-CCNC: 73 U/L (ref 14–36)
BASOPHILS # BLD AUTO: 0 K/UL (ref 0–0.2)
BASOPHILS NFR BLD AUTO: 0 %
BUN SERPL-SCNC: 19 MG/DL (ref 7–17)
BUN SERPL-SCNC: 20 MG/DL (ref 7–17)
BUN SERPL-SCNC: 20 MG/DL (ref 7–17)
CALCIUM SPEC-MCNC: 7.8 MG/DL (ref 8.4–10.2)
CALCIUM SPEC-MCNC: 8 MG/DL (ref 8.4–10.2)
CALCIUM SPEC-MCNC: 8.1 MG/DL (ref 8.4–10.2)
CHLORIDE SERPL-SCNC: 111 MMOL/L (ref 98–107)
CHLORIDE SERPL-SCNC: 112 MMOL/L (ref 98–107)
CHLORIDE SERPL-SCNC: 113 MMOL/L (ref 98–107)
CO2 BLDA-SCNC: 28 MMOL/L (ref 19–24)
CO2 SERPL-SCNC: 26 MMOL/L (ref 22–30)
CO2 SERPL-SCNC: 26 MMOL/L (ref 22–30)
CO2 SERPL-SCNC: 28 MMOL/L (ref 22–30)
EOSINOPHIL # BLD AUTO: 0.1 K/UL (ref 0–0.7)
EOSINOPHIL # BLD AUTO: 0.1 K/UL (ref 0–0.7)
EOSINOPHIL # BLD AUTO: 0.2 K/UL (ref 0–0.7)
EOSINOPHIL NFR BLD AUTO: 2 %
ERYTHROCYTE [DISTWIDTH] IN BLOOD BY AUTOMATED COUNT: 2.73 M/UL (ref 3.8–5.4)
ERYTHROCYTE [DISTWIDTH] IN BLOOD BY AUTOMATED COUNT: 2.82 M/UL (ref 3.8–5.4)
ERYTHROCYTE [DISTWIDTH] IN BLOOD BY AUTOMATED COUNT: 2.84 M/UL (ref 3.8–5.4)
ERYTHROCYTE [DISTWIDTH] IN BLOOD: 14.5 % (ref 11.5–15.5)
ERYTHROCYTE [DISTWIDTH] IN BLOOD: 14.5 % (ref 11.5–15.5)
ERYTHROCYTE [DISTWIDTH] IN BLOOD: 14.7 % (ref 11.5–15.5)
GLUCOSE BLD-MCNC: 130 MG/DL (ref 70–110)
GLUCOSE BLD-MCNC: 150 MG/DL (ref 70–110)
GLUCOSE BLD-MCNC: 161 MG/DL (ref 70–110)
GLUCOSE SERPL-MCNC: 149 MG/DL (ref 74–99)
GLUCOSE SERPL-MCNC: 155 MG/DL (ref 74–99)
GLUCOSE SERPL-MCNC: 156 MG/DL (ref 74–99)
HCO3 BLDA-SCNC: 27 MMOL/L (ref 21–25)
HCT VFR BLD AUTO: 28 % (ref 34–46)
HCT VFR BLD AUTO: 28.9 % (ref 34–46)
HCT VFR BLD AUTO: 29 % (ref 34–46)
HGB BLD-MCNC: 8.6 GM/DL (ref 11.4–16)
HGB BLD-MCNC: 9 GM/DL (ref 11.4–16)
HGB BLD-MCNC: 9 GM/DL (ref 11.4–16)
LYMPHOCYTES # SPEC AUTO: 0.6 K/UL (ref 1–4.8)
LYMPHOCYTES # SPEC AUTO: 0.8 K/UL (ref 1–4.8)
LYMPHOCYTES # SPEC AUTO: 0.9 K/UL (ref 1–4.8)
LYMPHOCYTES NFR SPEC AUTO: 12 %
LYMPHOCYTES NFR SPEC AUTO: 12 %
LYMPHOCYTES NFR SPEC AUTO: 9 %
MAGNESIUM SPEC-SCNC: 2.1 MG/DL (ref 1.6–2.3)
MAGNESIUM SPEC-SCNC: 2.3 MG/DL (ref 1.6–2.3)
MCH RBC QN AUTO: 31.7 PG (ref 25–35)
MCH RBC QN AUTO: 31.7 PG (ref 25–35)
MCH RBC QN AUTO: 31.9 PG (ref 25–35)
MCHC RBC AUTO-ENTMCNC: 30.9 G/DL (ref 31–37)
MCHC RBC AUTO-ENTMCNC: 31 G/DL (ref 31–37)
MCHC RBC AUTO-ENTMCNC: 31.1 G/DL (ref 31–37)
MCV RBC AUTO: 102.2 FL (ref 80–100)
MCV RBC AUTO: 102.5 FL (ref 80–100)
MCV RBC AUTO: 102.6 FL (ref 80–100)
MONOCYTES # BLD AUTO: 0.3 K/UL (ref 0–1)
MONOCYTES # BLD AUTO: 0.3 K/UL (ref 0–1)
MONOCYTES # BLD AUTO: 0.4 K/UL (ref 0–1)
MONOCYTES NFR BLD AUTO: 4 %
MONOCYTES NFR BLD AUTO: 5 %
MONOCYTES NFR BLD AUTO: 6 %
NEUTROPHILS # BLD AUTO: 5.2 K/UL (ref 1.3–7.7)
NEUTROPHILS # BLD AUTO: 5.5 K/UL (ref 1.3–7.7)
NEUTROPHILS # BLD AUTO: 5.9 K/UL (ref 1.3–7.7)
NEUTROPHILS NFR BLD AUTO: 79 %
NEUTROPHILS NFR BLD AUTO: 81 %
NEUTROPHILS NFR BLD AUTO: 84 %
PCO2 BLDA: 40 MMHG (ref 35–45)
PCO2 BLDA: 41 MMHG (ref 35–45)
PCO2 BLDA: 41 MMHG (ref 35–45)
PH BLDA: 7.42 [PH] (ref 7.35–7.45)
PH BLDA: 7.43 [PH] (ref 7.35–7.45)
PH BLDA: 7.44 [PH] (ref 7.35–7.45)
PH UR: 6 [PH] (ref 5–8)
PLATELET # BLD AUTO: 190 K/UL (ref 150–450)
PLATELET # BLD AUTO: 193 K/UL (ref 150–450)
PLATELET # BLD AUTO: 194 K/UL (ref 150–450)
PO2 BLDA: 93 MMHG (ref 83–108)
PO2 BLDA: 94 MMHG (ref 83–108)
PO2 BLDA: 94 MMHG (ref 83–108)
POTASSIUM SERPL-SCNC: 3.8 MMOL/L (ref 3.5–5.1)
POTASSIUM SERPL-SCNC: 4 MMOL/L (ref 3.5–5.1)
POTASSIUM SERPL-SCNC: 4 MMOL/L (ref 3.5–5.1)
PROT SERPL-MCNC: 5.3 G/DL (ref 6.3–8.2)
PROT SERPL-MCNC: 5.5 G/DL (ref 6.3–8.2)
SODIUM SERPL-SCNC: 140 MMOL/L (ref 137–145)
SODIUM SERPL-SCNC: 143 MMOL/L (ref 137–145)
SODIUM SERPL-SCNC: 143 MMOL/L (ref 137–145)
SP GR UR: 1.02 (ref 1–1.03)
UROBILINOGEN UR QL STRIP: <2 MG/DL (ref ?–2)
WBC # BLD AUTO: 6.5 K/UL (ref 3.8–10.6)
WBC # BLD AUTO: 6.6 K/UL (ref 3.8–10.6)
WBC # BLD AUTO: 7.3 K/UL (ref 3.8–10.6)

## 2023-04-23 RX ADMIN — INSULIN ASPART SCH: 100 INJECTION, SOLUTION INTRAVENOUS; SUBCUTANEOUS at 17:25

## 2023-04-23 RX ADMIN — METOPROLOL TARTRATE SCH MG: 25 TABLET, FILM COATED ORAL at 21:00

## 2023-04-23 RX ADMIN — LEVETIRACETAM SCH MLS/HR: 15 INJECTION INTRAVENOUS at 21:00

## 2023-04-23 RX ADMIN — PIPERACILLIN AND TAZOBACTAM SCH MLS/HR: 3; .375 INJECTION, POWDER, FOR SOLUTION INTRAVENOUS at 18:03

## 2023-04-23 RX ADMIN — APIXABAN SCH MG: 5 TABLET, FILM COATED ORAL at 21:00

## 2023-04-23 RX ADMIN — PIPERACILLIN AND TAZOBACTAM SCH MLS/HR: 3; .375 INJECTION, POWDER, FOR SOLUTION INTRAVENOUS at 02:30

## 2023-04-23 RX ADMIN — LEVETIRACETAM SCH MLS/HR: 15 INJECTION INTRAVENOUS at 09:04

## 2023-04-23 RX ADMIN — NICARDIPINE HYDROCHLORIDE SCH: 2.5 INJECTION INTRAVENOUS at 19:48

## 2023-04-23 RX ADMIN — PIPERACILLIN AND TAZOBACTAM SCH MLS/HR: 3; .375 INJECTION, POWDER, FOR SOLUTION INTRAVENOUS at 09:05

## 2023-04-23 RX ADMIN — PANTOPRAZOLE SODIUM SCH MG: 40 INJECTION, POWDER, FOR SOLUTION INTRAVENOUS at 09:04

## 2023-04-23 RX ADMIN — CHLORHEXIDINE GLUCONATE SCH ML: 1.2 RINSE ORAL at 09:04

## 2023-04-23 RX ADMIN — INSULIN ASPART SCH UNIT: 100 INJECTION, SOLUTION INTRAVENOUS; SUBCUTANEOUS at 05:54

## 2023-04-23 RX ADMIN — IPRATROPIUM BROMIDE AND ALBUTEROL SULFATE SCH ML: .5; 3 SOLUTION RESPIRATORY (INHALATION) at 20:17

## 2023-04-23 RX ADMIN — INSULIN ASPART SCH: 100 INJECTION, SOLUTION INTRAVENOUS; SUBCUTANEOUS at 13:20

## 2023-04-23 RX ADMIN — IPRATROPIUM BROMIDE AND ALBUTEROL SULFATE SCH ML: .5; 3 SOLUTION RESPIRATORY (INHALATION) at 08:07

## 2023-04-23 RX ADMIN — CHLORHEXIDINE GLUCONATE SCH ML: 1.2 RINSE ORAL at 21:00

## 2023-04-23 RX ADMIN — NOREPINEPHRINE BITARTRATE SCH: 1 INJECTION, SOLUTION, CONCENTRATE INTRAVENOUS at 11:16

## 2023-04-23 RX ADMIN — IPRATROPIUM BROMIDE AND ALBUTEROL SULFATE SCH ML: .5; 3 SOLUTION RESPIRATORY (INHALATION) at 13:03

## 2023-04-23 RX ADMIN — INSULIN ASPART SCH: 100 INJECTION, SOLUTION INTRAVENOUS; SUBCUTANEOUS at 00:48

## 2023-04-23 RX ADMIN — APIXABAN SCH MG: 5 TABLET, FILM COATED ORAL at 09:04

## 2023-04-23 RX ADMIN — ATORVASTATIN CALCIUM SCH MG: 80 TABLET, FILM COATED ORAL at 09:04

## 2023-04-23 RX ADMIN — IPRATROPIUM BROMIDE AND ALBUTEROL SULFATE SCH ML: .5; 3 SOLUTION RESPIRATORY (INHALATION) at 03:56

## 2023-04-23 RX ADMIN — IPRATROPIUM BROMIDE AND ALBUTEROL SULFATE SCH ML: .5; 3 SOLUTION RESPIRATORY (INHALATION) at 16:20

## 2023-04-23 RX ADMIN — METOPROLOL TARTRATE SCH MG: 25 TABLET, FILM COATED ORAL at 09:04

## 2023-04-23 NOTE — P.PN
Subjective


Progress Note Date: 04/23/23








67-year-old female who apparently had a cardiopulmonary arrest, at a local 

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  The patient apparently had a downtime of about 15 

minutes, for there was cardiopulmonary resuscitation and return of spontaneous 

circulation.  The patient was seen in the ER, by the ER physician, Dr. Pennington.  

The patient was vented, and a right femoral vein triple lumen catheter was 

placed.  Family members are in the room, we selected see the patient.  The 

patient herself is unresponsive.  She has a orally placed endotracheal tube.  

She's currently on the ventilator, with vent settings of volume assist control, 

rate 20, tidal volume 375, FiO2 100%, 5.  Blood gases show pO2 75, pCO2 of 53, 

and a pH is 7.17.  The patient is on norepinephrine at 0.15 mcg/kg/m, and 

propofol at 15 mcg/kg/m.  The patient has a history of COPD from secondhand 

tobacco exposure, a previous history of the fibrillator placement for 

cardiomyopathy, and history of stroke, and Sadaf filter placement.  In 

addition, the patient is on home O2, that she is supposed to use all the time, 

but she only uses as needed.  She was a smoker in the distant past.  White count

9.7, hemoglobin 11.4, hematocrit 37.9, and platelet count was normal.  D-dimer 

was 3.10.  Sodium 137, potassium 4.3, chlorides 104, CO2 17, anion gap 16, BUN 

and creatinine 12 and 0.76.  Troponin was 0.022 and N-terminal proBNP was 2290. 

Lactate was not measured but is probably elevated.  Testing for influenza A, and

B, RSV, and coronavirus are all negative.  Chest x-ray shows evidence of 

cardiomegaly, fluid overload, and an appropriately placed endotracheal tube.  

Computed tomography scan of the brain showed nothing acute.  CT angiogram was 

negative for PE.  There also may be some right lower lobe consolidation 

consistent with prior aspiration.





Progress note dated 04/16/2023.





67-year-old female who had an out-of-hospital cardiopulmonary arrest.  The 

patient had about 15 minutes of resuscitation before there was return of 

spontaneous circulation.  Unfortunately, the patient may have sustained anoxic 

brain injury.  Today, we place an art line.  She remains on the ventilator.  I 

did have neurology see her.  She is on volume assist control, rate 20, tidal 

volume 375, FiO2 60%, PEEP of 5.  Arterial blood gases show pO2 of 90, pCO2 34, 

and pH is 7.35.  The patient is on norepinephrine at 0.05 mcg/kg/m, propofol at 

40 mcg/kg/m, and saline at 75 mL an hour.  We will discontinue the Levaquin and 

Flagyl and start Zosyn.  In addition we'll start some tube feeds, at 10 mL an 

hour.  A right radial art line will be placed today.  White count 8.9, 

hemoglobin 10.8, hematocrit 34.1, and platelet count is normal.  Sodium 139, 

potassium 3.9, chlorides 112, CO2 18, BUN 19, creatinine 0.71.  Troponins were 

0.086 and 0.091.  Chest x-ray shows persistent bilateral infiltrates, more so in

the right lung than on the left.  This may relate to aspiration.








On today's evaluation of 04/18/2023, seeing the patient for a follow-up.  This 

is a case of a cardiac arrest with at least 15 minutes downtime.  The patient 

has an AICD in place.  Initial cardiac rhythm is not clear.  The did not have 

any pacemaker discharges and the pacemaker has not been interrogated yet.  Noted

the patient has a pacer AICD in place.  Her current cardiac rhythm is paced and 

her rate is currently at 76.  Hemodynamically, the patient is stable on no 

pressors.  The patient is maintaining her own blood pressure.  Meanwhile, the 

patient's troponins did not rise and the troponin peaked at 0.09.  The patient 

currently has signs of anoxic encephalopathy.  Attempts to wean her off the 

sedation yesterday feel that the patient became quite hypertensive and 

tachypneic and she did not show any reasonable neurological recovery.  Neurology

is on the case for the potential anoxic encephalopathy.  CAT scan of the brain 

was done on 04/15/2023 and 04/17/2023 and there is mild diffuse cerebral atrophy

without any acute brain changes.  There is bilateral sphenoid sinus disease.  

EEG was also done on 04/16/2023 indicating diffuse suppression due to anoxia.  

The patient is currently on propofol which is running at the rate of 15 

mcg/kg/m.  The patient is also on IV Keppra 1.5 g every 12 hours.  This morning,

the patient remains on a mechanical ventilator patient is on assist control mode

at the rate of 20, tidal volume of 375, FiO2 of 50% with a PEEP of 5.  The blood

gas shows a pH of 7.42 with a pCO2 of 34 and pO2 112.  Sodium is at 141 with a 

potassium level of 4.4, BUN is at 17 with a creatinine of 0.59.  WBC count at 

7.2 with a hemoglobin 9.3 and a platelet count of 163.  The chest x-ray from 

this morning is showing cardiomegaly along with bilateral pleural effusion.  ET 

tube is in a good location.  The patient also has a orogastric tube in place.  A

CT angiogram of the chest that was done on 04/15/2023 showed bilateral lower 

lobe and upper lobe consolidation and scattered areas of multifocal groundglass 

opacities throughout the lung.  Possibility of aspiration was considered in 

addition to underlying cardiomegaly and multiple acute minimally displaced 

anterior rib fractures in the setting of CPR.  IV fluids are currently at the 

rate of 25 mL an hour of 3% hypertonic saline.  This was initiated by neurology 

regarding the potential of CNS swelling.  As mentioned, the sodium level is at 

141 from this morning.  No seizure activity has been noted.Patient is currently 

on antibiotic without liquids 5 mg by mouth twice a day.  The patient is on met

oprolol 25 mg by mouth twice a day.  The patient is on Zestril 5 mg by mouth 

twice a day.  The patient is also on empiric antibiotic coverage with IV Zosyn. 

The echocardiogram showed an ejection fraction of 30-35% with global LV 

dysfunction.  There was mild aortic stenosis.





On today's evaluation of 04/19/2023, the patient remains neurologically impaired

and and anoxic encephalopathy.  The patient is post cardiac arrest.  The patient

is currently intubated on a mechanical ventilator.  Propofol is running at 20 

mcg/kg/m.  This is to maintain synchrony with a mechanical ventilator.  The 

patient is receiving daily sedation holidays.  She remains on a mechanical 

ventilator.  This morning, she is on assist-control mode at the rate of 20, 

tidal volume of 375, FiO2 of 50% with a PEEP of 5.  The blood gas from today 

shows a pH of 7.39 with a pCO2 of 36 and the pO2 is currently at 87.  The chest 

x-ray from today shows elevation of the right hemidiaphragm, a pacemaker is 

still present in the left anterior chest.  No significant airspace disease or 

pulmonary infiltrates.  Orogastric tube is in a good location.  Meanwhile, the 

patient carries a WBC count of 5.8 with a hemoglobin of 8.6 and a platelet count

of 151.  BUN is 18 with a creatinine of 0.5 and a sodium level is at 146.  UA is

negative.  [facility] has been contacted.  As part of their workup, a CAT 

scan of the chest abdomen and pelvis was done.  There is left-sided rib 

fractures probably related to CPR.  No acute intra-abdominal abnormalities.  

Some consolidation changes in the lung bases are seen probably related to atel

ectasis.  Unfortunately, neurologically, the patient continues to be 

significantly impaired.  No reasonable neurological recovery while off propofol.

 No seizure activity and the patient remains on Keppra.  The patient is also 

receiving enteral feeding for nutritional support and she is currently on vital 

AF.  The patient remains on the hypertonic saline per neurology's 

recommendation.  The patient remains on Keppra.  The patient is on IV Zosyn.  

Her anticoagulation has been resumed she remains on Eliquis 5 mg by mouth twice 

a day.





On today's evaluation of 04/20/2023, the patient is intubated on a mechanical 

ventilator, she remains on propofol running at 10 mcg/kg/m.  Deeply comatose and

unresponsive.  Earlier this morning, twitching of the eyes was also noted and 

she is a possible undergoing an EEG today.  As mentioned, she is a case of 

severe anoxic encephalopathy post cardiac arrest.  The patient is being 

considered for gift of life.  Family has consented.  She remains on IV Keppra 

and Dilantin.  Dilantin was started yesterday by neurology..  She was on 

hypertonic saline and this was discontinued by neurology.  Meanwhile, the 

patient remains on a mechanical ventilator, assist control mode with a rate of 

20, tidal volume of 375, FiO2 of 50% with a PEEP of 5.  Chest x-ray from today 

shows left basilar atelectasis.  Orotracheal tube is in a good location.  The 

patient has a pacer/AICD over the left anterior chest area.  No significant 

abnormalities noted on today's chest x-ray.  The blood gas from today shows a pH

of 7.4 with a pCO2 of 39 and pO2 of 91.  She is afebrile.  She was having back 

and forth fever and this morning she is afebrile.  The electrolytes are all norm

al.  Sodium is at 144 with a BUN of 22 and a creatinine of 0.4.  Blood sugars at

182.  LFTs are normal.  UA is negative.  Hemodynamically stable.  No pressors 

for now.  Cardiac rhythm is sinus.  She is receiving enteral feeding for 

nutritional support and she is currently on vital high-protein at the rate of 35

mL an hour.  The patient remains on IV Zosyn as an empiric antibiotic coverage. 

The patient remains on Eliquis 5 mg by mouth twice a day.





On 04/21/2023, the patient remains unresponsive.  She is currently off propofol 

and she doesn't show any signs of neurologic recovery.  She is deeply comatose 

at this point in time.  Her EEG of the brain was repeated yesterday and it 

showed severe abnormal EEG due to severely suppressed background in the 

bilateral hemispheres consistent with diffuse and global encephalopathy.  This 

is consistent with anoxic encephalopathy.  Meanwhile, no seizure activity was 

noted.  The patient remains on a combination of Keppra and Dilantin and 

neurology is on the case.  Hypertonic saline has been discontinued.  Sodium 

remains slightly elevated and based on the electrolytes today, the sodium level 

is at 147, with a chloride of 118.  BUN is at 20 with a creatinine of 0.4.  She 

remains on a mechanical ventilator.  Assist-control mode at the rate of 20, 

tidal volume of 375, FiO2 of 50% with a PEEP of 5.  Chest x-ray shows 

atelectatic change in lung bases more so on the right.  Adequate oxygenation on 

the blood gas with a pO2 of 105.  PH is at 7.4 with a pCO2 of 40.  Rest of the 

electrodes are normal with a BUN of 20 and a creatinine of 0.49.  The white cell

cause of 5.6 with a hemoglobin of 8.5.  She is afebrile.  She is on empiric 

antibiotic coverage with IV Zosyn.  Her cardiac rhythm remains sinus with 

occasional pacing.  She remains on the coagulation with Eliquis.  She is 

receiving enteral feeding for nutritional support.  She is currently on 0.9 

saline at [facility].  Family has opted to proceed with gift of life.





04/22/2023, patient remains neurologically impaired and unchanged compared to 

yesterday, comatose and unresponsive.  She remains off sedation.  EEG showed 

diffuse suppression consistent with anoxic encephalopathy.  Awaiting gift of 

life and potential organ donation.  Awaiting also more family members to arrive 

prior to proceeding with potential organ donation.  She remains on assist-

control mode and the patient is on assist-control mode rate of 20, tidal volume 

of 375, FiO2 50% with a PEEP of 5.  PH is at 7.42 with a pCO2 of 41 and pO2 of 

86.  Electrolytes are all stable.  Creatinine is stable.  CBC is unremarkable 

with a stable hemoglobin of 9.2 and a white cell count of 8.5.  Hemodynamically 

stable and the patient remains in a normal sinus rhythm.  No pressors for now.  

She is receiving enteral feeding for nutritional support and she remains on 

vital high-protein.  This is running at the rate of 49 mL an hour.  She remains 

on Keppra and Dilantin.  She remains on NovoLog sinus care coverage.  She 

remains on anticoagulation with Eliquis.





04/23/2023, the patient is still awaiting gift of life.  No issues for today.  

No sedation for at least 2-3 days.  Nevertheless, overnight, the patient was 

started on a low-dose propofol at 5 mcg/kg/m as the patient was having coughing 

fits and this essentially help to suppress her cough and episodes..  Remains 

comatose unresponsive.  No seizure activity.  Hemodynamically stable on no 

pressors.  Remains on mechanical ventilator on assist control mode at the rate 

of 20, tidal volume of 375, FiO2 of 50% with a PEEP of 5.  The blood gas shows a

pH of 7.44 with a pCO2 of 40 and pO2 of 93.  The white cell count at 6.5 with a 

hemoglobin of 9 and a platelet count of 190.  The rest of the electrolytes show 

a sodium of 143 with a potassium level of 4, BUN is at 20 with a creatinine of 

0.4.  Receiving enteral feeding for nutritional support and the patient remains 

on vital high-protein at the rate of 49 mL an hour.  No other significant issues

for now.  No significant orotracheal secretions.  Her chest x-ray was done on 

04/21/2023 and showed stable findings with small bilateral pleural effusions.  

Her cardiac rhythm is occasionally.  And the patient remains on anticoagulants. 

She remains on IV Zosyn.  She is afebrile.  Zosyn could be potentially 

discontinued for now.





Objective





- Vital Signs


Vital signs: 


                                   Vital Signs











Temp  99.3 F   04/23/23 08:00


 


Pulse  73   04/23/23 09:00


 


Resp  25 H  04/23/23 09:00


 


BP  117/52   04/22/23 23:00


 


Pulse Ox  96   04/23/23 09:00


 


FiO2  50   04/23/23 08:14








                                 Intake & Output











 04/22/23 04/23/23 04/23/23





 18:59 06:59 18:59


 


Intake Total 1255 1192 478


 


Output Total 750 510 300


 


Balance 505 682 178


 


Weight  92.7 kg 


 


Intake:   


 


   563 252


 


    .9NS  330 90


 


    0.9NS Pressure Bag 36 33 12


 


    Phenytoin Sodium Inj 200 36  





    mg In Sodium Chloride 0.9   





    % 36 ml @ 80 mls/hr IVPB   





    Q12HR MIKHAIL Rx#:[id number]   


 


    Piperacillin-Tazobactam 3 25 100 50





    .375 gm In Sodium   





    Chloride 0.9% 100 ml @ 25   





    mls/hr IVPB Q8H MIKHAIL Rx#:   





    [id number]   


 


    levETIRAcetam IV 1,500 mg 100 100 100





    In Saline 1 100ml.bag @   





    400 mls/hr IVPB Q12HR MIKHAIL   





    Rx#:[id number]   


 


  Intake, IV Titration  0 





  Amount   


 


    propofoL 1,000 mg In  0 





    Empty Bag 1 bag @ 15 MCG/   





    KG/MIN 7.348 mls/hr IV .   





    [id number] MIKHAIL Rx#:[id number]   


 


  Tube Feeding 588 539 196


 


  Other 90 90 30


 


Output:   


 


  Urine 750 510 300


 


Other:   


 


  Voiding Method Indwelling Catheter Indwelling Catheter 








                       ABP, PAP, CO, CI - Last Documented











Arterial Blood Pressure        123/49

















- Exam








No acute distress, sedated on propofol, with an orally placed endotracheal tube.

 The patient is currently on propofol.  The patient is quite symptomatic since a

mechanical ventilator.





HEENT examination is grossly unremarkable.  





Neck supple.  Full range of motion.  No adenopathy thyromegaly or neck vein 

distention.





Cardiovascular examination reveals regular rhythm rate.  S1-S2 normal.  No S3 or

S4.  No discernible murmur noted.   





Lungs reveal scattered rhonchi.  No wheezes or crackles.  Breath sounds equal.  





Abdomen obese, without bowel sounds.





Extremities are intact.  No cyanosis clubbing or edema.





Skin is without rash or lesion.





Neurologic examination the patient is currently on propofol.  Propofol is 

running at a low dose of 5 mcg/kg/m this was started yesterday at shift change. 

This was given to her essentially to suppress her cough.  The patient senses the

pain probably more so a reflex.  No facial asymmetry.  Her gaze is up  and more 

so to the left.  No nystagmus.  The patient is breathing above the mechanical 

ventilator.  The patient has a positive cough and a gag reflex.  The patient has

a slow pupillary response.  The reflexes are equal and symmetrical and dim

inished bilaterally.  Negative clonus, negative Babinski's.  Neurologic exam is 

unchanged..  The patient continues to have respiratory drive.  She has a very 

weak corneal reflex.  The patient is having episodic twitching of the eyes.  





- Labs


CBC & Chem 7: 


                                 04/23/23 04:50





                                 04/23/23 04:50


Labs: 


                  Abnormal Lab Results - Last 24 Hours (Table)











  04/22/23 04/22/23 04/22/23 Range/Units





  11:50 17:57 18:05 


 


RBC     (3.80-5.40)  m/uL


 


Hgb     (11.4-16.0)  gm/dL


 


Hct     (34.0-46.0)  %


 


MCV     (80.0-100.0)  fL


 


Lymphocytes #     (1.0-4.8)  k/uL


 


ABG HCO3     (21-25)  mmol/L


 


ABG Total CO2     (19-24)  mmol/L


 


Chloride     ()  mmol/L


 


BUN     (7-17)  mg/dL


 


Creatinine     (0.52-1.04)  mg/dL


 


Glucose     (74-99)  mg/dL


 


POC Glucose (mg/dL)  177 H  152 H  152 H  ()  mg/dL


 


Calcium     (8.4-10.2)  mg/dL














  04/22/23 04/23/23 04/23/23 Range/Units





  23:48 04:50 04:50 


 


RBC   2.82 L   (3.80-5.40)  m/uL


 


Hgb   9.0 L   (11.4-16.0)  gm/dL


 


Hct   28.9 L   (34.0-46.0)  %


 


MCV   102.5 H   (80.0-100.0)  fL


 


Lymphocytes #   0.8 L   (1.0-4.8)  k/uL


 


ABG HCO3     (21-25)  mmol/L


 


ABG Total CO2     (19-24)  mmol/L


 


Chloride    113 H  ()  mmol/L


 


BUN    20 H  (7-17)  mg/dL


 


Creatinine    0.45 L  (0.52-1.04)  mg/dL


 


Glucose    155 H  (74-99)  mg/dL


 


POC Glucose (mg/dL)  143 H    ()  mg/dL


 


Calcium    8.1 L  (8.4-10.2)  mg/dL














  04/23/23 04/23/23 Range/Units





  05:46 05:46 


 


RBC    (3.80-5.40)  m/uL


 


Hgb    (11.4-16.0)  gm/dL


 


Hct    (34.0-46.0)  %


 


MCV    (80.0-100.0)  fL


 


Lymphocytes #    (1.0-4.8)  k/uL


 


ABG HCO3  27 H   (21-25)  mmol/L


 


ABG Total CO2  28 H   (19-24)  mmol/L


 


Chloride    ()  mmol/L


 


BUN    (7-17)  mg/dL


 


Creatinine    (0.52-1.04)  mg/dL


 


Glucose    (74-99)  mg/dL


 


POC Glucose (mg/dL)   161 H  ()  mg/dL


 


Calcium    (8.4-10.2)  mg/dL














Assessment and Plan


Plan: 








Status post out-of-hospital cardiopulmonary arrest, with at least 15 minutes of 

downtime, before cardiopulmonary resuscitation and return of spontaneous 

circulation, were accomplished.





Status post intubation and mechanical ventilation for cardiopulmonary arrest, 

04/15/2023.





anoxic brain injury, post cardiac arrest, currently unresponsive.  The patient 

does have brainstem reflexes.  Nevertheless, unresponsive once off sedation.  

Neurologically, the patient shows signs of severe neurologic impairment.  She 

continues to have brainstem reflexes. Repeat EEG to be done today.  Neurology is

on the case.  She continues to show signs of severe anoxic encephalopathy.  EEG 

from 04/20/2023 was noted.  There is severe encephalopathy.  No seizure activity

has been noted.  Neurologically unchanged and the patient is currently off 

propofol.  She remains on Keppra.  Neurologically unchanged





Acute fever post cardiac arrest, the patient is normothermic for now.  The 

patient responded to our hypothermia measures and she is currently afebrile.





Multifocal bilateral pulmonary infiltrates, consider aspiration and the patient 

is currently on IV Zosyn 





Systolic heart failure with an ejection fraction of 30-35% and impaired LV.  

Noted the troponins were not elevated





Nondisplaced rib fractures related to CPR, as evidenced on the chest x-ray and 

the CAT scan of the chest





Pacemaker insertion with a paced cardiac rhythm for now





History of atrial fibrillation.  The current cardiac rhythm and the patient is 

on anticoagulation with Eliquis





Status post AICD placement.





History of COPD.





History of hypertension.





History of CVA.





Prior history of Sadaf filter placement.





History of anxiety/depression.





Enteral feeding for nutritional support with vital high-protein at the rate of 

49 mL an hour





Plan





Neurologically unchanged


A low-dose propofol was added to suppress her cough


Completely unresponsive and comatose and there has been no changes in neurologic

status over this past week.


Treatment essentially supportive pending possibility of gift of life Monday


This is a case of anoxic encephalopathy, severe post cardiac arrest.  Family is 

insistent gift of life.  Based on that, we are supporting this patient to the 

family and gift of life is ready for potential organ donation.


Continue vent support, no ventilator changes for today


She is afebrile this morning


Continue ventilator support, no changes for today


May discontinue the IV Zosyn


Temperature normalized


Neurology follow-up regarding her anoxic encephalopathy


EEG and CAT scan of the brain were noted


Continue anticoagulation with Eliquis


Continue metoprolol 25 mg by mouth twice a day


The pacemaker needs to be evaluated and this will be coordinated with cardiology


Poor prognosis based on the above-mentioned comorbidities


We'll continue to follow.  Physical.  Evaluation was done in more than 30 

minutes.


Possible withdrawal of care and the family is considering that.  Gift of life is

on the case.

## 2023-04-23 NOTE — P.PN
Subjective


Progress Note Date: 04/22/23








Patient is a 67-year-old male with a known history of chronic atrial 

fibrillation on anticoagulation with Eliquis, cardiomyopathy status post ICD 

placement, COPD on home oxygen at 4 L via nasal cannula, hypertension, 

anxiety/depression and history of femoral neck fracture s/p repair in January 2023 was brought to the hospital by EMS status postcardiac arrest.  Patient was 

at [facility] where she was found to have worsening shortness of breath and became 

unresponsive on a motorized scooter..  Patient underwent CPR for about 15 

minutes by EMS and was given 3 doses of epinephrine with return of spontaneous 

circulation and was intubated.  Patient was brought to ER for evaluation.  

Patient was unresponsive and was able to provide any history.


On arrival chest x-ray showed cardiomegaly and mild pulmonary vascular 

congestion.  Correlate with BNP for congestive heart failure.  Right basilar 

patchy airspace opacities may represent pulmonary edema versus infiltrate.  

Endotracheal tube in appropriate position.  Possible NG tube terminating in the 

mid esophagus.


CT head showed no acute intracranial process.  Paranasal sinus disease with air-

fluid level within the left maxillary sinus.  Correlate for acute sinusitis.


Chest CTA showed no evidence of PE.  Bilateral lower lobe and right upper lobe 

consolidation with a scattered multifocal groundglass opacities throughout the 

lungs.  Findings are compatible with pneumonia possibly aspiration.


Multiple acute minimally displaced rib fractures likely related to CPR in the 

setting of cardiac arrest.  Remote to subacute bilateral rib fractures also demo

nstrated.  No pneumothorax.  Cardiomegaly.


Laboratory data showed WBC 9.7 hemoglobin 11.4 platelets 246 and D-dimer 3.1


Initial ABG showed pH of 7.17 PCO2 30 and PO2 75


Sodium 135, potassium 4.3, chloride 104, bicarb is 17 BUN 12 and creatinine 0.76

and blood sugar was 325 and lactic acid 2.9 AST 57 ALT 14 alk phos 73 and 

troponin x1 negative


proBNP 2290


Influenza A B RSV and COVID-19 PCR not detected.





4/16/2023


Patient is in the MICU.  Remains mechanical ventilator.  Off pressor support.


Otherwise patient remains unresponsive.  EEG was ordered and neurology is on 

board.


2D echocardiogram showed ejection fraction 30 to 35% with anterior septal 

hypokinesis.  AICD C interrogation was done.


Cardiology neurology and pulmonary is on board.


Chest x-ray showed persistent right midlung and bibasilar multifocal acute 

infiltrates without edema.  No change from 1 day.


Patient remains on antibiotics Zosyn.  NG tube in place.





4/17/2023





Patient is seen and evaluated and follow-up in the ICU remains on mechanical 

ventilation, FiO2 is 60% with a peep of 5.  Patient is off pressor support 

although continues on as needed Ativan along with IV Keppra and propofol.  

Patient also maintained on IV antibiotics in the form of Zosyn.  Chest x-ray 

today shows stable bilateral interstitial and airspace opacification with 

bilateral small pleural effusions may relate to pulmonary edema or infectious 

etiology.  Blood cultures thus far are negative and sputum culture currently 

pending.  Patient does continue to have low-grade temps 101 early this morning. 

WBC remains within normal limits and pro-calcitonin is 0.49.  Other kidney 

functions within normal limits and blood sugars being monitored.  Per nursing 

staff attempts at weaning were performed although patient became extremely 

tachycardic and restless and not following commands and placed back on sedation.

 An EEG was done although pending at this time.  Repeat CT of the brain early 

this morning shows no acute intracranial hemorrhage or midline shift with mild 

diffuse cerebral atrophy redemonstrated with slightly worsening bilateral spheno

id acute sinusitis.  Multiple medical consultations following an per nursing 

staff Taktio was notified although unsure of family is aware.  Patient was

on the registry.





04/18/2023





Patient is seen and evaluated in the ICU maintained on mechanical ventilation 

with an FiO2 of 55% and PEEP is 5.  Patient is maintained on IV antibiotics and 

continues to have fevers and cultures thus far been negative.  Patient is 

continued on sedation and off pressor support and remains on hypertonic solution

.  Neurology following as well as pulmonary intensivist with overall poor 

prognosis.  Per nursing staff, family has been discussing possible comfort care 

although not ready to wean at this time.  Gift of life also following as patient

is on the registry evaluating the patient.





04/19/2023





Patient continues to be in the ICU on mechanical ventilation with an FiO2 of 50%

and PEEP is 5.  Multiple medical consultations including cardiology, pulmonary 

intensivist, neurology following.  Patient had a CT of the chest abdomen pelvis 

per gift of life services which confirmed bilateral rib fractures involving at 

least 2 ribs through 10 bilaterally with no evidence of acute abdominal process 

and consolidation changes in the lung bases likely secondary to atelectasis and 

hypoventilatory.  Patient is maintained on hypertonic solution along with IV 

Keppra with neurology following and patient remains clinically the same.  No 

further seizure-like activity noted although does continue with jerking and 

twitching type movements once removed from sedation.  Patient is undergoing 

occasional sedation holidays.  Gift of life is following as patient is on the 

registry although has not approached family as of yet and family is considering 

terminale weaning and comfort measures.  Cardiology signed off and will follow 

as needed.  Overall prognosis is poor as neurological status continues to be 

unchanged.





04/20/2023





Patient is seen in follow-up today continues to be on mechanical ventilation 

with an FiO2 of 50% and PEEP is 5.  Patient does continue on sedation along with

IV Keppra and empiric Zosyn and not requiring pressors.  Patient's sodium is 

elevated today at 150 and has been on hypertonic solution that was just 

discontinued and will follow-up with repeat sodium level.  Possible D5 in water.

 Gift of life is following as patient is on the registry and family is agreeable

discussing possible gift of life this Sunday as they are currently awaiting more

family members.  Repeat EEG was done today and continues to show severely 

abnormal EEG due to severely suppressed background and consistent with diffuse 

global encephalopathy. Not following any commands with sedation holidays.  Chest

x-ray today shows bilateral pleural effusions although stable. Patient is 

continuing to have fevers today.





04/21/2023


Patient is seen in follow-up today continues in the ICU for mechanical 

ventilation with no changes to prevent settings.  Patient is a gift of life 

donor and they are following possibly working on pronation this Monday while 

awaiting additional family members and or time.  Patient is currently maintained

on IV antibiotics and per nursing staff did require a small dose of Levophed for

approximately a half hour this morning although blood pressure is maintained at 

this time.  Patient is afebrile and maintained on IV antibiotics in the form of 

Zosyn.





04/22/2023





Patient is seen and evaluated this morning in the ICU with no changes from 

previous day.  Patient remains off sedation and completely comatose unresponsive

being followed by multiple medical consultations.  Patient family awaiting gift 

of life for possible organ donation and more family to arrive.  Awaiting for 

Monday as well for OR availability in the organ donation takes place.  Patient 

is currently afebrile although was having continued low-grade intermittent 

times.  Continuing with supportive care.  Patient remains on mechanical 

ventilation with an FiO2 of 50% and PEEP is 5.





Review of systems: Unable to obtain as patient is intubated and on sedation








PHYSICAL EXAMINATION: 


Patient is on mechanical ventilator.  FiO2 is currently 50% with a PEEP of 5


HEENT: Normocephalic. Neck is supple. Pupils reactive. Nostrils clear. Oral 

cavity is moist. 


Neck reveals no JVD, carotid bruits, or thyromegaly. 


CHEST EXAMINATION: Trachea is central. Symmetrical expansion.  Bibasilar 

diminished sounds and coarse breath sounds.  No wheezing.


CARDIAC: Normal S1, S2 with no gallops. No murmurs 


ABDOMEN: Soft. Bowel sounds present.  Nontender.  No organomegaly. No abdominal 

bruits. 


Extremities: Trace lower extremity edema.  No clubbing or cyanosis


Neurologically patient is on mechanical ventilator.  Currently on sedation


Skin: No rash or skin lesions. 


Psychiatric: Could not be assessed at this time


Musculoskeletal: No joint swelling or deformity.





Assessment:





Acute cardiac arrest s/p CPR for about 15 minutes with return of spontaneous 

circulation.  Status post intubation by EMS.


Episodes of nonsustained V. tach prior to cardiac arrest which is most likely 

the cause although awaiting full AICD interrogation report


Possible anoxic brain injury with severe encephalopathy noted an EEG


Chronic atrial fibrillation on anticoagulant Eliquis


History of ICD placement


Hyperglycemia


COPD history 


Chronic hypoxic respiratory failure secondary to COPD on 4 L oxygen via nasal 

cannula


Recent history of femoral neck fracture repair in January 2023


History of IVC filter placement


Anxiety/depression history


Full code while awaiting gift of life





Plan:





Patient is currently in the MICU and remains on mechanical ventilation with an 

FiO2 of 50% and PEEP is 5.  Status post CPR and currently on mechanical ventilat

or.  Downtime was was about 15 minutes.  Possible anoxic brain injury. EEG 

repeated remains with severe encephalopathy maintained on IV Keppra with 

neurology following.  


Continuing with supportive care while awaiting gift of life and possible organ 

donation on Monday 


Multiple medical consultations following


Given the downtime and severity of neurological assessment and cardiac arrest, 

overall prognosis remains poor. Gift of life also following and evaluating this 

patient is on registry


Family is agreeable with gift of life and donation may possibly take place on 

Monday when there is OR time available and when more family members are present








The impression and plan of care has been dictated by Myrtle Hyman, Nurse 

Practitioner as directed.





Dr. Chris MD


I have performed a history and examination and MDM of this patient, discussed 

the same with the dictator, and  agree with the dictator's assessment and plan 

as written ,documented as a scribe. Based on total visit time,  I have performed

more than 50% of the visit.  





Objective





- Vital Signs


Vital signs: 


                                   Vital Signs











Temp  98.5 F   04/22/23 08:00


 


Pulse  71   04/22/23 09:00


 


Resp  22   04/22/23 09:00


 


BP  118/52   04/22/23 09:00


 


Pulse Ox  97   04/22/23 09:00


 


FiO2  50   04/22/23 08:36








                                 Intake & Output











 04/21/23 04/22/23 04/22/23





 18:59 06:59 18:59


 


Intake Total 8826.511 8801.467 86


 


Output Total 545 635 200


 


Balance 673.286 469.467 -114


 


Weight 93.4 kg  


 


Intake:   


 


   376 86


 


    .9NS  240 80


 


    0.9NS Pressure Bag 36 36 6


 


    Phenytoin Sodium Inj 200 36  





    mg In Sodium Chloride 0.9   





    % 36 ml @ 80 mls/hr IVPB   





    Q12HR MIKHAIL Rx#:[id number]   


 


    Piperacillin-Tazobactam 3 200  





    .375 gm In Sodium   





    Chloride 0.9% 100 ml @ 25   





    mls/hr IVPB Q8H MIKHAIL Rx#:   





    [id number]   


 


    levETIRAcetam IV 1,500 mg  100 





    In Saline 1 100ml.bag @   





    400 mls/hr IVPB Q12HR MIKHAIL   





    Rx#:[id number]   


 


  Intake, IV Titration 34.286 1.467 





  Amount   


 


    Midazolam HCl 50 mg In  1.467 





    Sodium Chloride 0.9% 40   





    ml @ 1 MG/HR 1 mls/hr IV   





    .Q24H MIKHAIL Rx#:[id number]   


 


    propofoL 1,000 mg In 34.286  





    Empty Bag 1 bag @ 15 MCG/   





    KG/MIN 7.348 mls/hr IV .   





    Q25Q09L MIKHAIL Rx#:[id number]   


 


  Tube Feeding 572 637 


 


  Other 60 90 


 


Output:   


 


  Urine 545 635 200


 


Other:   


 


  Voiding Method Indwelling Catheter Indwelling Catheter Indwelling Catheter








                       ABP, PAP, CO, CI - Last Documented











Arterial Blood Pressure        149/64

















- Labs


CBC & Chem 7: 


                                 04/22/23 06:15





                                 04/22/23 06:15


Labs: 


                  Abnormal Lab Results - Last 24 Hours (Table)











  04/21/23 04/21/23 04/21/23 Range/Units





  11:31 11:38 17:48 


 


RBC     (3.80-5.40)  m/uL


 


Hgb     (11.4-16.0)  gm/dL


 


Hct     (34.0-46.0)  %


 


MCV     (80.0-100.0)  fL


 


Lymphocytes #     (1.0-4.8)  k/uL


 


ABG HCO3     (21-25)  mmol/L


 


ABG Total CO2     (19-24)  mmol/L


 


Chloride     ()  mmol/L


 


BUN     (7-17)  mg/dL


 


Creatinine     (0.52-1.04)  mg/dL


 


Glucose     (74-99)  mg/dL


 


POC Glucose (mg/dL)  152 H  158 H  182 H  ()  mg/dL


 


Calcium     (8.4-10.2)  mg/dL














  04/21/23 04/22/23 04/22/23 Range/Units





  17:50 00:56 04:40 


 


RBC     (3.80-5.40)  m/uL


 


Hgb     (11.4-16.0)  gm/dL


 


Hct     (34.0-46.0)  %


 


MCV     (80.0-100.0)  fL


 


Lymphocytes #     (1.0-4.8)  k/uL


 


ABG HCO3    26 H  (21-25)  mmol/L


 


ABG Total CO2    27 H  (19-24)  mmol/L


 


Chloride  116 H    ()  mmol/L


 


BUN  21 H    (7-17)  mg/dL


 


Creatinine     (0.52-1.04)  mg/dL


 


Glucose  180 H    (74-99)  mg/dL


 


POC Glucose (mg/dL)   186 H   ()  mg/dL


 


Calcium  8.0 L    (8.4-10.2)  mg/dL














  04/22/23 04/22/23 04/22/23 Range/Units





  06:15 06:15 06:19 


 


RBC  2.89 L    (3.80-5.40)  m/uL


 


Hgb  9.2 L    (11.4-16.0)  gm/dL


 


Hct  29.7 L    (34.0-46.0)  %


 


MCV  102.7 H    (80.0-100.0)  fL


 


Lymphocytes #  0.7 L    (1.0-4.8)  k/uL


 


ABG HCO3     (21-25)  mmol/L


 


ABG Total CO2     (19-24)  mmol/L


 


Chloride   115 H   ()  mmol/L


 


BUN   18 H   (7-17)  mg/dL


 


Creatinine   0.48 L   (0.52-1.04)  mg/dL


 


Glucose   192 H   (74-99)  mg/dL


 


POC Glucose (mg/dL)    172 H  ()  mg/dL


 


Calcium   7.9 L   (8.4-10.2)  mg/dL








                      Microbiology - Last 24 Hours (Table)











 04/15/23 13:34 Blood Culture - Final





 Blood    No Growth after 144 hours


 


 04/15/23 13:34 Blood Culture - Final





 Blood    No Growth after 144 hours

## 2023-04-24 VITALS — TEMPERATURE: 98.6 F

## 2023-04-24 VITALS — RESPIRATION RATE: 30 BRPM | HEART RATE: 72 BPM

## 2023-04-24 LAB
ALBUMIN SERPL-MCNC: 2.6 G/DL (ref 3.5–5)
ALP SERPL-CCNC: 61 U/L (ref 38–126)
ALT SERPL-CCNC: 41 U/L (ref 4–34)
ANION GAP SERPL CALC-SCNC: 4 MMOL/L
APTT BLD: 19.2 SEC (ref 22–30)
AST SERPL-CCNC: 72 U/L (ref 14–36)
BUN SERPL-SCNC: 18 MG/DL (ref 7–17)
CALCIUM SPEC-MCNC: 7.9 MG/DL (ref 8.4–10.2)
CHLORIDE SERPL-SCNC: 111 MMOL/L (ref 98–107)
CO2 BLDA-SCNC: 28 MMOL/L (ref 19–24)
CO2 SERPL-SCNC: 26 MMOL/L (ref 22–30)
ERYTHROCYTE [DISTWIDTH] IN BLOOD BY AUTOMATED COUNT: 2.66 M/UL (ref 3.8–5.4)
ERYTHROCYTE [DISTWIDTH] IN BLOOD: 14.7 % (ref 11.5–15.5)
GLUCOSE BLD-MCNC: 139 MG/DL (ref 70–110)
GLUCOSE SERPL-MCNC: 142 MG/DL (ref 74–99)
HCO3 BLDA-SCNC: 27 MMOL/L (ref 21–25)
HCT VFR BLD AUTO: 27.2 % (ref 34–46)
HGB BLD-MCNC: 8.5 GM/DL (ref 11.4–16)
INR PPP: 0.9 (ref ?–1.2)
MAGNESIUM SPEC-SCNC: 2.1 MG/DL (ref 1.6–2.3)
MCH RBC QN AUTO: 31.8 PG (ref 25–35)
MCHC RBC AUTO-ENTMCNC: 31.1 G/DL (ref 31–37)
MCV RBC AUTO: 102.3 FL (ref 80–100)
PCO2 BLDA: 40 MMHG (ref 35–45)
PH BLDA: 7.43 [PH] (ref 7.35–7.45)
PH UR: 6.5 [PH] (ref 5–8)
PLATELET # BLD AUTO: 188 K/UL (ref 150–450)
PO2 BLDA: 92 MMHG (ref 83–108)
POTASSIUM SERPL-SCNC: 3.8 MMOL/L (ref 3.5–5.1)
PROT SERPL-MCNC: 5.2 G/DL (ref 6.3–8.2)
PT BLD: 9.9 SEC (ref 9–12)
SODIUM SERPL-SCNC: 141 MMOL/L (ref 137–145)
SP GR UR: 1.02 (ref 1–1.03)
UROBILINOGEN UR QL STRIP: <2 MG/DL (ref ?–2)
WBC # BLD AUTO: 7 K/UL (ref 3.8–10.6)

## 2023-04-24 RX ADMIN — LEVETIRACETAM SCH MLS/HR: 15 INJECTION INTRAVENOUS at 08:56

## 2023-04-24 RX ADMIN — INSULIN ASPART SCH: 100 INJECTION, SOLUTION INTRAVENOUS; SUBCUTANEOUS at 00:40

## 2023-04-24 RX ADMIN — PANTOPRAZOLE SODIUM SCH MG: 40 INJECTION, POWDER, FOR SOLUTION INTRAVENOUS at 08:55

## 2023-04-24 RX ADMIN — MORPHINE SULFATE PRN MG: 4 INJECTION, SOLUTION INTRAMUSCULAR; INTRAVENOUS at 13:30

## 2023-04-24 RX ADMIN — MORPHINE SULFATE PRN MG: 4 INJECTION, SOLUTION INTRAMUSCULAR; INTRAVENOUS at 14:00

## 2023-04-24 RX ADMIN — INSULIN ASPART SCH: 100 INJECTION, SOLUTION INTRAVENOUS; SUBCUTANEOUS at 05:59

## 2023-04-24 RX ADMIN — IPRATROPIUM BROMIDE AND ALBUTEROL SULFATE SCH ML: .5; 3 SOLUTION RESPIRATORY (INHALATION) at 08:06

## 2023-04-24 RX ADMIN — IPRATROPIUM BROMIDE AND ALBUTEROL SULFATE SCH: .5; 3 SOLUTION RESPIRATORY (INHALATION) at 11:50

## 2023-04-24 RX ADMIN — IPRATROPIUM BROMIDE AND ALBUTEROL SULFATE SCH ML: .5; 3 SOLUTION RESPIRATORY (INHALATION) at 00:36

## 2023-04-24 RX ADMIN — ATORVASTATIN CALCIUM SCH MG: 80 TABLET, FILM COATED ORAL at 08:55

## 2023-04-24 RX ADMIN — NOREPINEPHRINE BITARTRATE SCH: 1 INJECTION, SOLUTION, CONCENTRATE INTRAVENOUS at 10:08

## 2023-04-24 RX ADMIN — INSULIN ASPART SCH: 100 INJECTION, SOLUTION INTRAVENOUS; SUBCUTANEOUS at 11:59

## 2023-04-24 RX ADMIN — IPRATROPIUM BROMIDE AND ALBUTEROL SULFATE SCH ML: .5; 3 SOLUTION RESPIRATORY (INHALATION) at 04:00

## 2023-04-24 RX ADMIN — MORPHINE SULFATE PRN MG: 4 INJECTION, SOLUTION INTRAMUSCULAR; INTRAVENOUS at 13:14

## 2023-04-24 RX ADMIN — PIPERACILLIN AND TAZOBACTAM SCH MLS/HR: 3; .375 INJECTION, POWDER, FOR SOLUTION INTRAVENOUS at 10:44

## 2023-04-24 RX ADMIN — APIXABAN SCH MG: 5 TABLET, FILM COATED ORAL at 08:55

## 2023-04-24 RX ADMIN — METOPROLOL TARTRATE SCH MG: 25 TABLET, FILM COATED ORAL at 08:55

## 2023-04-24 RX ADMIN — CHLORHEXIDINE GLUCONATE SCH ML: 1.2 RINSE ORAL at 08:55

## 2023-04-24 RX ADMIN — MIDAZOLAM SCH: 5 INJECTION INTRAMUSCULAR; INTRAVENOUS at 00:41

## 2023-04-24 RX ADMIN — PIPERACILLIN AND TAZOBACTAM SCH: 3; .375 INJECTION, POWDER, FOR SOLUTION INTRAVENOUS at 03:21

## 2023-04-24 RX ADMIN — IPRATROPIUM BROMIDE AND ALBUTEROL SULFATE SCH: .5; 3 SOLUTION RESPIRATORY (INHALATION) at 15:40

## 2023-04-24 NOTE — P.PN
Subjective


Progress Note Date: 04/24/23


Principal diagnosis: 





Cardiac arrest, anoxic brain injury


67-year-old female who apparently had a cardiopulmonary arrest, at a local 

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  The patient apparently had a downtime of about 15 

minutes, for there was cardiopulmonary resuscitation and return of spontaneous 

circulation.  The patient was seen in the ER, by the ER physician, Dr. Pennington.  

The patient was vented, and a right femoral vein triple lumen catheter was 

placed.  Family members are in the room, we selected see the patient.  The 

patient herself is unresponsive.  She has a orally placed endotracheal tube.  

She's currently on the ventilator, with vent settings of volume assist control, 

rate 20, tidal volume 375, FiO2 100%, 5.  Blood gases show pO2 75, pCO2 of 53, 

and a pH is 7.17.  The patient is on norepinephrine at 0.15 mcg/kg/m, and 

propofol at 15 mcg/kg/m.  The patient has a history of COPD from secondhand 

tobacco exposure, a previous history of the fibrillator placement for cardio

myopathy, and history of stroke, and Sadaf filter placement.  In addition, 

the patient is on home O2, that she is supposed to use all the time, but she 

only uses as needed.  She was a smoker in the distant past.  White count 9.7, 

hemoglobin 11.4, hematocrit 37.9, and platelet count was normal.  D-dimer was 

3.10.  Sodium 137, potassium 4.3, chlorides 104, CO2 17, anion gap 16, BUN and 

creatinine 12 and 0.76.  Troponin was 0.022 and N-terminal proBNP was 2290.  

Lactate was not measured but is probably elevated.  Testing for influenza A, and

B, RSV, and coronavirus are all negative.  Chest x-ray shows evidence of 

cardiomegaly, fluid overload, and an appropriately placed endotracheal tube.  

Computed tomography scan of the brain showed nothing acute.  CT angiogram was 

negative for PE.  There also may be some right lower lobe consolidation 

consistent with prior aspiration.








04/23/2023, the patient is still awaiting gift of life.  No issues for today.  

No sedation for at least 2-3 days.  Nevertheless, overnight, the patient was 

started on a low-dose propofol at 5 mcg/kg/m as the patient was having coughing 

fits and this essentially help to suppress her cough and episodes..  Remains 

comatose unresponsive.  No seizure activity.  Hemodynamically stable on no 

pressors.  Remains on mechanical ventilator on assist control mode at the rate 

of 20, tidal volume of 375, FiO2 of 50% with a PEEP of 5.  The blood gas shows a

pH of 7.44 with a pCO2 of 40 and pO2 of 93.  The white cell count at 6.5 with a 

hemoglobin of 9 and a platelet count of 190.  The rest of the electrolytes show 

a sodium of 143 with a potassium level of 4, BUN is at 20 with a creatinine of 

0.4.  Receiving enteral feeding for nutritional support and the patient remains 

on vital high-protein at the rate of 49 mL an hour.  No other significant issues

for now.  No significant orotracheal secretions.  Her chest x-ray was done on 

04/21/2023 and showed stable findings with small bilateral pleural effusions.  

Her cardiac rhythm is occasionally.  And the patient remains on anticoagulants. 

She remains on IV Zosyn.  She is afebrile.  Zosyn could be potentially 

discontinued for now.





Reevaluated today on 4/24/2023, patient remains in the ICU, intubated 

mechanically ventilated, on propofol at 5 mcg/kg/m.  Vent settings are assist 

control rate of 2010 volume 375 FiO2 50% PEEP of 5 ABG showed a pO2 of 92 pCO2 

40 pH of 7.43.  Patient remains comatose, unresponsive, no seizure activity, 

hemodynamically she is not requiring any pressors.  WBC count is 7.0 hemoglobin 

8.5, basic metabolic profile is normal renal profile is normal, complete 

metabolic profile is relatively normal, urinalysis is normal.  Last chest x-ray 

from 2 days ago showed low lung volumes and small bilateral pleural effusions.  

This was dated/21.  No follow-up chest x-ray has been done since.  Patient 

remains on Zosyn empirically for presumptive aspiration pneumonia.  Patient also

remains on eliquis 5 mg by mouth twice a day as ordered by cardiology for her 

atrial fibrillation














Objective





- Vital Signs


Vital signs: 


                                   Vital Signs











Temp  98.6 F   04/24/23 12:00


 


Pulse  70   04/24/23 12:00


 


Resp  20   04/24/23 12:00


 


BP  117/52   04/22/23 23:00


 


Pulse Ox  97   04/24/23 12:00


 


FiO2  50   04/24/23 12:00








                                 Intake & Output











 04/23/23 04/24/23 04/24/23





 18:59 06:59 18:59


 


Intake Total 2774.692 9042 298.374


 


Output Total 665 550 345


 


Balance 507.669 555 -46.626


 


Weight  91 kg 91 kg


 


Intake:   


 


   476 198


 


    .9NS  240 80


 


    0.9NS Pressure Bag 30 36 18


 


    Piperacillin-Tazobactam 3 125 100 





    .375 gm In Sodium   





    Chloride 0.9% 100 ml @ 25   





    mls/hr IVPB Q8H MIK[facility] Rx#:   





    [id number]   


 


    levETIRAcetam IV 1,500 mg 100 100 100





    In Saline 1 100ml.bag @   





    400 mls/hr IVPB Q12HR MIKHAIL   





    Rx#:[id number]   


 


  Intake, IV Titration 29.669  40.374





  Amount   


 


    Morphine Sulfate (100 mg/   0.17





    2 ml) 100 mg In Sodium   





    Chloride 0.9% 100 ml @ 1   





    MG/HR 1.02 mls/hr IV .   





    Q24H [facility]IL Rx#:[id number]   


 


    propofoL 1,000 mg In 29.669  40.204





    Empty Bag 1 bag @ 15 MCG/   





    KG/MIN 7.348 mls/hr IV .   





    [id number] MIKHAIL Rx#:[id number]   


 


  Tube Feeding 588 539 


 


  Other 90 90 60


 


Output:   


 


  Urine 665 550 345


 


Other:   


 


  Voiding Method Indwelling Catheter Indwelling Catheter Indwelling Catheter








                       ABP, PAP, CO, CI - Last Documented











Arterial Blood Pressure        131/49

















- Exam





Physical Exam: Revealed a 67-year-old female in no distress, intubated and 

mechanically ventilated.


Head: Atraumatic, normocephalic.


HEENT:[Neck is supple.] [No neck masses.] [No thyromegaly.] [No JVD.]


Chest: [Minimal fine crackles at the bases no rhonchi and no wheezes.


Cardiac Exam: [Normal S1 and S2, no S3 gallop, no murmur.]


Abdomen: [Soft, nontender,  no megaly, no rebound, no guarding, normal bowel 

sounds.]


Extremities: [No clubbing, no edema, no cyanosis.]


Neurological Exam: Patient is comatose, unresponsive, however she is on a low 

dose of propofol.  Mostly suppress her cough.  Patient has absent corneal 

reflexes, she has only a gag reflex.  She has an upward gaze, no nystagmus.  

Very slow pupillary reflexes.  Negative clonus, negative Babinski, continues to 

have respiratory drive.  And again she has no corneal reflex.


Psychiatric: Could not be assessed.  Patient is comatose.


Skin: No rashes.








- Labs


CBC & Chem 7: 


                                 04/24/23 04:48





                                 04/24/23 04:48


Labs: 


                  Abnormal Lab Results - Last 24 Hours (Table)











  04/23/23 04/23/23 04/23/23 Range/Units





  13:10 13:11 13:11 


 


RBC   2.84 L   (3.80-5.40)  m/uL


 


Hgb   9.0 L   (11.4-16.0)  gm/dL


 


Hct   29.0 L   (34.0-46.0)  %


 


MCV   102.2 H   (80.0-100.0)  fL


 


MCHC     (31.0-37.0)  g/dL


 


Lymphocytes #   0.9 L   (1.0-4.8)  k/uL


 


APTT     (22.0-30.0)  sec


 


ABG HCO3  27 H    (21-25)  mmol/L


 


ABG Total CO2  28 H    (19-24)  mmol/L


 


ABG O2 Saturation     (94-97)  %


 


Chloride    112 H  ()  mmol/L


 


BUN    20 H  (7-17)  mg/dL


 


Creatinine    0.47 L  (0.52-1.04)  mg/dL


 


Glucose    149 H  (74-99)  mg/dL


 


POC Glucose (mg/dL)     ()  mg/dL


 


Calcium    8.0 L  (8.4-10.2)  mg/dL


 


Total Bilirubin     (0.2-1.3)  mg/dL


 


AST    73 H  (14-36)  U/L


 


ALT    40 H  (4-34)  U/L


 


Total Protein    5.5 L  (6.3-8.2)  g/dL


 


Albumin    2.7 L  (3.5-5.0)  g/dL


 


Urine Protein     (Negative)  














  04/23/23 04/23/23 04/23/23 Range/Units





  13:11 17:15 21:01 


 


RBC     (3.80-5.40)  m/uL


 


Hgb     (11.4-16.0)  gm/dL


 


Hct     (34.0-46.0)  %


 


MCV     (80.0-100.0)  fL


 


MCHC     (31.0-37.0)  g/dL


 


Lymphocytes #     (1.0-4.8)  k/uL


 


APTT     (22.0-30.0)  sec


 


ABG HCO3    27 H  (21-25)  mmol/L


 


ABG Total CO2    28 H  (19-24)  mmol/L


 


ABG O2 Saturation    97.3 H  (94-97)  %


 


Chloride     ()  mmol/L


 


BUN     (7-17)  mg/dL


 


Creatinine     (0.52-1.04)  mg/dL


 


Glucose     (74-99)  mg/dL


 


POC Glucose (mg/dL)   150 H   ()  mg/dL


 


Calcium     (8.4-10.2)  mg/dL


 


Total Bilirubin     (0.2-1.3)  mg/dL


 


AST     (14-36)  U/L


 


ALT     (4-34)  U/L


 


Total Protein     (6.3-8.2)  g/dL


 


Albumin     (3.5-5.0)  g/dL


 


Urine Protein  Trace H    (Negative)  














  04/23/23 04/23/23 04/24/23 Range/Units





  21:03 21:03 00:19 


 


RBC  2.73 L    (3.80-5.40)  m/uL


 


Hgb  8.6 L    (11.4-16.0)  gm/dL


 


Hct  28.0 L    (34.0-46.0)  %


 


MCV  102.6 H    (80.0-100.0)  fL


 


MCHC  30.9 L    (31.0-37.0)  g/dL


 


Lymphocytes #  0.6 L    (1.0-4.8)  k/uL


 


APTT     (22.0-30.0)  sec


 


ABG HCO3     (21-25)  mmol/L


 


ABG Total CO2     (19-24)  mmol/L


 


ABG O2 Saturation     (94-97)  %


 


Chloride   111 H   ()  mmol/L


 


BUN   19 H   (7-17)  mg/dL


 


Creatinine     (0.52-1.04)  mg/dL


 


Glucose   156 H   (74-99)  mg/dL


 


POC Glucose (mg/dL)    139 H  ()  mg/dL


 


Calcium   7.8 L   (8.4-10.2)  mg/dL


 


Total Bilirubin     (0.2-1.3)  mg/dL


 


AST   71 H   (14-36)  U/L


 


ALT   40 H   (4-34)  U/L


 


Total Protein   5.3 L   (6.3-8.2)  g/dL


 


Albumin   2.6 L   (3.5-5.0)  g/dL


 


Urine Protein     (Negative)  














  04/24/23 04/24/23 04/24/23 Range/Units





  04:46 04:48 04:48 


 


RBC   2.66 L   (3.80-5.40)  m/uL


 


Hgb   8.5 L   (11.4-16.0)  gm/dL


 


Hct   27.2 L   (34.0-46.0)  %


 


MCV   102.3 H   (80.0-100.0)  fL


 


MCHC     (31.0-37.0)  g/dL


 


Lymphocytes #     (1.0-4.8)  k/uL


 


APTT     (22.0-30.0)  sec


 


ABG HCO3  27 H    (21-25)  mmol/L


 


ABG Total CO2  28 H    (19-24)  mmol/L


 


ABG O2 Saturation     (94-97)  %


 


Chloride    111 H  ()  mmol/L


 


BUN    18 H  (7-17)  mg/dL


 


Creatinine    0.48 L  (0.52-1.04)  mg/dL


 


Glucose    142 H  (74-99)  mg/dL


 


POC Glucose (mg/dL)     ()  mg/dL


 


Calcium    7.9 L  (8.4-10.2)  mg/dL


 


Total Bilirubin    0.1 L  (0.2-1.3)  mg/dL


 


AST    72 H  (14-36)  U/L


 


ALT    41 H  (4-34)  U/L


 


Total Protein    5.2 L  (6.3-8.2)  g/dL


 


Albumin    2.6 L  (3.5-5.0)  g/dL


 


Urine Protein     (Negative)  














  04/24/23 04/24/23 Range/Units





  06:10 09:15 


 


RBC    (3.80-5.40)  m/uL


 


Hgb    (11.4-16.0)  gm/dL


 


Hct    (34.0-46.0)  %


 


MCV    (80.0-100.0)  fL


 


MCHC    (31.0-37.0)  g/dL


 


Lymphocytes #    (1.0-4.8)  k/uL


 


APTT   19.2 L  (22.0-30.0)  sec


 


ABG HCO3    (21-25)  mmol/L


 


ABG Total CO2    (19-24)  mmol/L


 


ABG O2 Saturation    (94-97)  %


 


Chloride    ()  mmol/L


 


BUN    (7-17)  mg/dL


 


Creatinine    (0.52-1.04)  mg/dL


 


Glucose    (74-99)  mg/dL


 


POC Glucose (mg/dL)    ()  mg/dL


 


Calcium    (8.4-10.2)  mg/dL


 


Total Bilirubin    (0.2-1.3)  mg/dL


 


AST    (14-36)  U/L


 


ALT    (4-34)  U/L


 


Total Protein    (6.3-8.2)  g/dL


 


Albumin    (3.5-5.0)  g/dL


 


Urine Protein  Trace H   (Negative)  














Assessment and Plan


Assessment: 





Impression:


Acute hypoxic respiratory failure secondary to out of hospital cardiac arrest 

with down time of at least 15 minutes.


Severe anoxic brain injury


Acute aspiration pneumonia is strongly suspected.


Chronic systolic congestive heart failure with LV dysfunction, ejection fraction

of 30-35%.


Nondisplaced rib fractures secondary to CPR.


Chronic atrial fibrillation, on eliquis.


History of pacemaker insertion and previous AICD placement.


History of CVA


Benign essential hypertension


History of underlying COPD


History of IVC filter placement





Recommendation:


Continue ventilatory support


Continue nutritional support


Continue GI and DVT prophylaxis


Continue Zosyn empirically for aspiration pneumonia


Continue hemodynamic support if necessary/pressors if needed


Patient is being evaluated for gift of life possibly today per


Overall prognosis is extremely poor.


Critical care time is over 30 minutes.


We will continue to follow.


Time with Patient: Greater than 30

## 2023-04-24 NOTE — P.PN
Subjective


Progress Note Date: 04/24/23








Patient is a 67-year-old male with a known history of chronic atrial 

fibrillation on anticoagulation with Eliquis, cardiomyopathy status post ICD 

placement, COPD on home oxygen at 4 L via nasal cannula, hypertension, 

anxiety/depression and history of femoral neck fracture s/p repair in January 2023 was brought to the hospital by EMS status postcardiac arrest.  Patient was 

at [facility] where she was found to have worsening shortness of breath and became 

unresponsive on a motorized scooter..  Patient underwent CPR for about 15 

minutes by EMS and was given 3 doses of epinephrine with return of spontaneous 

circulation and was intubated.  Patient was brought to ER for evaluation.  

Patient was unresponsive and was able to provide any history.


On arrival chest x-ray showed cardiomegaly and mild pulmonary vascular 

congestion.  Correlate with BNP for congestive heart failure.  Right basilar 

patchy airspace opacities may represent pulmonary edema versus infiltrate.  

Endotracheal tube in appropriate position.  Possible NG tube terminating in the 

mid esophagus.


CT head showed no acute intracranial process.  Paranasal sinus disease with air-

fluid level within the left maxillary sinus.  Correlate for acute sinusitis.


Chest CTA showed no evidence of PE.  Bilateral lower lobe and right upper lobe 

consolidation with a scattered multifocal groundglass opacities throughout the 

lungs.  Findings are compatible with pneumonia possibly aspiration.


Multiple acute minimally displaced rib fractures likely related to CPR in the 

setting of cardiac arrest.  Remote to subacute bilateral rib fractures also demo

nstrated.  No pneumothorax.  Cardiomegaly.


Laboratory data showed WBC 9.7 hemoglobin 11.4 platelets 246 and D-dimer 3.1


Initial ABG showed pH of 7.17 PCO2 30 and PO2 75


Sodium 135, potassium 4.3, chloride 104, bicarb is 17 BUN 12 and creatinine 0.76

and blood sugar was 325 and lactic acid 2.9 AST 57 ALT 14 alk phos 73 and 

troponin x1 negative


proBNP 2290


Influenza A B RSV and COVID-19 PCR not detected.





4/16/2023


Patient is in the MICU.  Remains mechanical ventilator.  Off pressor support.


Otherwise patient remains unresponsive.  EEG was ordered and neurology is on 

board.


2D echocardiogram showed ejection fraction 30 to 35% with anterior septal 

hypokinesis.  AICD C interrogation was done.


Cardiology neurology and pulmonary is on board.


Chest x-ray showed persistent right midlung and bibasilar multifocal acute 

infiltrates without edema.  No change from 1 day.


Patient remains on antibiotics Zosyn.  NG tube in place.





4/17/2023





Patient is seen and evaluated and follow-up in the ICU remains on mechanical 

ventilation, FiO2 is 60% with a peep of 5.  Patient is off pressor support 

although continues on as needed Ativan along with IV Keppra and propofol.  

Patient also maintained on IV antibiotics in the form of Zosyn.  Chest x-ray 

today shows stable bilateral interstitial and airspace opacification with 

bilateral small pleural effusions may relate to pulmonary edema or infectious 

etiology.  Blood cultures thus far are negative and sputum culture currently 

pending.  Patient does continue to have low-grade temps 101 early this morning. 

WBC remains within normal limits and pro-calcitonin is 0.49.  Other kidney 

functions within normal limits and blood sugars being monitored.  Per nursing 

staff attempts at weaning were performed although patient became extremely 

tachycardic and restless and not following commands and placed back on sedation.

 An EEG was done although pending at this time.  Repeat CT of the brain early 

this morning shows no acute intracranial hemorrhage or midline shift with mild 

diffuse cerebral atrophy redemonstrated with slightly worsening bilateral spheno

id acute sinusitis.  Multiple medical consultations following an per nursing 

staff [employer] was notified although unsure of family is aware.  Patient was

on the registry.





04/18/2023





Patient is seen and evaluated in the ICU maintained on mechanical ventilation 

with an FiO2 of 55% and PEEP is 5.  Patient is maintained on IV antibiotics and 

continues to have fevers and cultures thus far been negative.  Patient is 

continued on sedation and off pressor support and remains on hypertonic solution

.  Neurology following as well as pulmonary intensivist with overall poor 

prognosis.  Per nursing staff, family has been discussing possible comfort care 

although not ready to wean at this time.  Gift of life also following as patient

is on the registry evaluating the patient.





04/19/2023





Patient continues to be in the ICU on mechanical ventilation with an FiO2 of 50%

and PEEP is 5.  Multiple medical consultations including cardiology, pulmonary 

intensivist, neurology following.  Patient had a CT of the chest abdomen pelvis 

per gift of life services which confirmed bilateral rib fractures involving at 

least 2 ribs through 10 bilaterally with no evidence of acute abdominal process 

and consolidation changes in the lung bases likely secondary to atelectasis and 

hypoventilatory.  Patient is maintained on hypertonic solution along with IV 

Keppra with neurology following and patient remains clinically the same.  No 

further seizure-like activity noted although does continue with jerking and 

twitching type movements once removed from sedation.  Patient is undergoing 

occasional sedation holidays.  Gift of life is following as patient is on the 

registry although has not approached family as of yet and family is considering 

terminale weaning and comfort measures.  Cardiology signed off and will follow 

as needed.  Overall prognosis is poor as neurological status continues to be 

unchanged.





04/20/2023





Patient is seen in follow-up today continues to be on mechanical ventilation 

with an FiO2 of 50% and PEEP is 5.  Patient does continue on sedation along with

IV Keppra and empiric Zosyn and not requiring pressors.  Patient's sodium is 

elevated today at 150 and has been on hypertonic solution that was just 

discontinued and will follow-up with repeat sodium level.  Possible D5 in water.

 Gift of life is following as patient is on the registry and family is agreeable

discussing possible gift of life this Sunday as they are currently awaiting more

family members.  Repeat EEG was done today and continues to show severely 

abnormal EEG due to severely suppressed background and consistent with diffuse 

global encephalopathy. Not following any commands with sedation holidays.  Chest

x-ray today shows bilateral pleural effusions although stable. Patient is 

continuing to have fevers today.





04/21/2023


Patient is seen in follow-up today continues in the ICU for mechanical 

ventilation with no changes to prevent settings.  Patient is a gift of life 

donor and they are following possibly working on pronation this Monday while 

awaiting additional family members and or time.  Patient is currently maintained

on IV antibiotics and per nursing staff did require a small dose of Levophed for

approximately a half hour this morning although blood pressure is maintained at 

this time.  Patient is afebrile and maintained on IV antibiotics in the form of 

Zosyn.





04/22/2023





Patient is seen and evaluated this morning in the ICU with no changes from 

previous day.  Patient remains off sedation and completely comatose unresponsive

being followed by multiple medical consultations.  Patient family awaiting gift 

of life for possible organ donation and more family to arrive.  Awaiting for 

Monday as well for OR availability in the organ donation takes place.  Patient 

is currently afebrile although was having continued low-grade intermittent 

times.  Continuing with supportive care.  Patient remains on mechanical 

ventilation with an FiO2 of 50% and PEEP is 5.





04/23/2023





Patient is seen in follow-up this morning continues to be on mechanical 

ventilation while awaiting other family members and gift of life proceed with 

possible organ donation on Monday.  Patient remains on an FiO2 of 50% with a 

PEEP of 5 and no changes.  Neurologically there has been no changes in family 

continuing to wish to proceed with gift of life as that is what patient wanted. 

Awaiting OR availability as well and will likely proceed on Monday.  Patient is 

currently afebrile and antibiotics being discontinued maintained on some low-

dose propofol.





04/24/2023





Patient is seen and evaluated in follow-up this morning is a gift of life 

candidate and proceeding with possible organ harvesting and donation today 

around 1 PM.  Multiple family members present and gift of life is following.  

Patient remains on mechanical ventilation with no changes and will proceed with 

gift of life.  Overall prognosis remains poor.





Review of systems: Unable to obtain as patient is intubated and on sedation








PHYSICAL EXAMINATION: 


Patient is on mechanical ventilator.  FiO2 is currently 50% with a PEEP of 5


HEENT: Normocephalic. Neck is supple. Pupils reactive. Nostrils clear. Oral 

cavity is moist. 


Neck reveals no JVD, carotid bruits, or thyromegaly. 


CHEST EXAMINATION: Trachea is central. Symmetrical expansion.  Bibasilar 

diminished sounds and coarse breath sounds.  No wheezing.


CARDIAC: Normal S1, S2 with no gallops. No murmurs 


ABDOMEN: Soft. Bowel sounds present.  Nontender.  No organomegaly. No abdominal 

bruits. 


Extremities: Trace upper and lower extremity edema noted.  No clubbing or 

cyanosis


Neurologically patient is on mechanical ventilator.  Currently on sedation


Skin: No rash or skin lesions. 


Psychiatric: Could not be assessed at this time


Musculoskeletal: No joint swelling or deformity.





Assessment:





Acute cardiac arrest s/p CPR for about 15 minutes with return of spontaneous 

circulation.  Status post intubation by EMS.


Episodes of nonsustained V. tach prior to cardiac arrest which is most likely 

the cause although awaiting full AICD interrogation report


Possible anoxic brain injury with severe encephalopathy noted an EEG


Chronic atrial fibrillation on anticoagulant Eliquis


History of ICD placement


Hyperglycemia


COPD history 


Chronic hypoxic respiratory failure secondary to COPD on 4 L oxygen via nasal 

cannula


Recent history of femoral neck fracture repair in January 2023


History of IVC filter placement


Anxiety/depression history


Full code while awaiting gift of life





Plan:





Patient is currently in the MICU and remains on mechanical ventilation with an 

FiO2 of 50% and PEEP is 5.  Status post CPR and currently on mechanical 

ventilator.  Downtime was was about 15 minutes.  Possible anoxic brain injury. 

EEG repeated remains with severe encephalopathy maintained on IV Keppra with 

neurology following.  





Patient is a gift of life candidate and on the registry and will be going to go 

are around 1 PM for possible organ harvesting and donation


Multiple family members are present


We'll continue to follow











The impression and plan of care has been dictated by Myrtle Hyman, Nurse 

Practitioner as directed.





Dr. Chris MD


I have performed a history and examination and MDM of this patient, discussed 

the same with the dictator, and  agree with the dictator's assessment and plan 

as written ,documented as a scribe. Based on total visit time,  I have performed

more than 50% of the visit.  





Objective





- Vital Signs


Vital signs: 


                                   Vital Signs











Temp  98.6 F   04/24/23 08:00


 


Pulse  79   04/24/23 09:00


 


Resp  20   04/24/23 09:00


 


BP  117/52   04/22/23 23:00


 


Pulse Ox  96   04/24/23 09:00


 


FiO2  50   04/24/23 08:06








                                 Intake & Output











 04/23/23 04/24/23 04/24/23





 18:59 06:59 18:59


 


Intake Total 7965.242 8386 129


 


Output Total 665 550 240


 


Balance 507.669 555 -111


 


Weight  91 kg 


 


Intake:   


 


   476 69


 


    .9NS  240 60


 


    0.9NS Pressure Bag 30 36 9


 


    Piperacillin-Tazobactam 3 125 100 





    .375 gm In Sodium   





    Chloride 0.9% 100 ml @ 25   





    mls/hr IVPB Q8H MIKHAIL Rx#:   





    [id number]   


 


    levETIRAcetam IV 1,500 mg 100 100 





    In Saline 1 100ml.bag @   





    400 mls/hr IVPB Q12HR MIKHAIL   





    Rx#:[id number]   


 


  Intake, IV Titration 29.669  





  Amount   


 


    propofoL 1,000 mg In 29.669  





    Empty Bag 1 bag @ 15 MCG/   





    KG/MIN 7.348 mls/hr IV .   





    [id number] MIKHAIL Rx#:[id number]   


 


  Tube Feeding 588 539 


 


  Other 90 90 60


 


Output:   


 


  Urine 665 550 240


 


Other:   


 


  Voiding Method Indwelling Catheter Indwelling Catheter Indwelling Catheter








                       ABP, PAP, CO, CI - Last Documented











Arterial Blood Pressure        95/40

















- Labs


CBC & Chem 7: 


                                 04/24/23 04:48





                                 04/24/23 04:48


Labs: 


                  Abnormal Lab Results - Last 24 Hours (Table)











  04/23/23 04/23/23 04/23/23 Range/Units





  12:02 13:10 13:11 


 


RBC    2.84 L  (3.80-5.40)  m/uL


 


Hgb    9.0 L  (11.4-16.0)  gm/dL


 


Hct    29.0 L  (34.0-46.0)  %


 


MCV    102.2 H  (80.0-100.0)  fL


 


MCHC     (31.0-37.0)  g/dL


 


Lymphocytes #    0.9 L  (1.0-4.8)  k/uL


 


ABG HCO3   27 H   (21-25)  mmol/L


 


ABG Total CO2   28 H   (19-24)  mmol/L


 


ABG O2 Saturation     (94-97)  %


 


Chloride     ()  mmol/L


 


BUN     (7-17)  mg/dL


 


Creatinine     (0.52-1.04)  mg/dL


 


Glucose     (74-99)  mg/dL


 


POC Glucose (mg/dL)  130 H    ()  mg/dL


 


Calcium     (8.4-10.2)  mg/dL


 


Total Bilirubin     (0.2-1.3)  mg/dL


 


AST     (14-36)  U/L


 


ALT     (4-34)  U/L


 


Total Protein     (6.3-8.2)  g/dL


 


Albumin     (3.5-5.0)  g/dL


 


Urine Protein     (Negative)  














  04/23/23 04/23/23 04/23/23 Range/Units





  13:11 13:11 17:15 


 


RBC     (3.80-5.40)  m/uL


 


Hgb     (11.4-16.0)  gm/dL


 


Hct     (34.0-46.0)  %


 


MCV     (80.0-100.0)  fL


 


MCHC     (31.0-37.0)  g/dL


 


Lymphocytes #     (1.0-4.8)  k/uL


 


ABG HCO3     (21-25)  mmol/L


 


ABG Total CO2     (19-24)  mmol/L


 


ABG O2 Saturation     (94-97)  %


 


Chloride  112 H    ()  mmol/L


 


BUN  20 H    (7-17)  mg/dL


 


Creatinine  0.47 L    (0.52-1.04)  mg/dL


 


Glucose  149 H    (74-99)  mg/dL


 


POC Glucose (mg/dL)    150 H  ()  mg/dL


 


Calcium  8.0 L    (8.4-10.2)  mg/dL


 


Total Bilirubin     (0.2-1.3)  mg/dL


 


AST  73 H    (14-36)  U/L


 


ALT  40 H    (4-34)  U/L


 


Total Protein  5.5 L    (6.3-8.2)  g/dL


 


Albumin  2.7 L    (3.5-5.0)  g/dL


 


Urine Protein   Trace H   (Negative)  














  04/23/23 04/23/23 04/23/23 Range/Units





  21:01 21:03 21:03 


 


RBC   2.73 L   (3.80-5.40)  m/uL


 


Hgb   8.6 L   (11.4-16.0)  gm/dL


 


Hct   28.0 L   (34.0-46.0)  %


 


MCV   102.6 H   (80.0-100.0)  fL


 


MCHC   30.9 L   (31.0-37.0)  g/dL


 


Lymphocytes #   0.6 L   (1.0-4.8)  k/uL


 


ABG HCO3  27 H    (21-25)  mmol/L


 


ABG Total CO2  28 H    (19-24)  mmol/L


 


ABG O2 Saturation  97.3 H    (94-97)  %


 


Chloride    111 H  ()  mmol/L


 


BUN    19 H  (7-17)  mg/dL


 


Creatinine     (0.52-1.04)  mg/dL


 


Glucose    156 H  (74-99)  mg/dL


 


POC Glucose (mg/dL)     ()  mg/dL


 


Calcium    7.8 L  (8.4-10.2)  mg/dL


 


Total Bilirubin     (0.2-1.3)  mg/dL


 


AST    71 H  (14-36)  U/L


 


ALT    40 H  (4-34)  U/L


 


Total Protein    5.3 L  (6.3-8.2)  g/dL


 


Albumin    2.6 L  (3.5-5.0)  g/dL


 


Urine Protein     (Negative)  














  04/24/23 04/24/23 04/24/23 Range/Units





  00:19 04:46 04:48 


 


RBC    2.66 L  (3.80-5.40)  m/uL


 


Hgb    8.5 L  (11.4-16.0)  gm/dL


 


Hct    27.2 L  (34.0-46.0)  %


 


MCV    102.3 H  (80.0-100.0)  fL


 


MCHC     (31.0-37.0)  g/dL


 


Lymphocytes #     (1.0-4.8)  k/uL


 


ABG HCO3   27 H   (21-25)  mmol/L


 


ABG Total CO2   28 H   (19-24)  mmol/L


 


ABG O2 Saturation     (94-97)  %


 


Chloride     ()  mmol/L


 


BUN     (7-17)  mg/dL


 


Creatinine     (0.52-1.04)  mg/dL


 


Glucose     (74-99)  mg/dL


 


POC Glucose (mg/dL)  139 H    ()  mg/dL


 


Calcium     (8.4-10.2)  mg/dL


 


Total Bilirubin     (0.2-1.3)  mg/dL


 


AST     (14-36)  U/L


 


ALT     (4-34)  U/L


 


Total Protein     (6.3-8.2)  g/dL


 


Albumin     (3.5-5.0)  g/dL


 


Urine Protein     (Negative)  














  04/24/23 04/24/23 Range/Units





  04:48 06:10 


 


RBC    (3.80-5.40)  m/uL


 


Hgb    (11.4-16.0)  gm/dL


 


Hct    (34.0-46.0)  %


 


MCV    (80.0-100.0)  fL


 


MCHC    (31.0-37.0)  g/dL


 


Lymphocytes #    (1.0-4.8)  k/uL


 


ABG HCO3    (21-25)  mmol/L


 


ABG Total CO2    (19-24)  mmol/L


 


ABG O2 Saturation    (94-97)  %


 


Chloride  111 H   ()  mmol/L


 


BUN  18 H   (7-17)  mg/dL


 


Creatinine  0.48 L   (0.52-1.04)  mg/dL


 


Glucose  142 H   (74-99)  mg/dL


 


POC Glucose (mg/dL)    ()  mg/dL


 


Calcium  7.9 L   (8.4-10.2)  mg/dL


 


Total Bilirubin  0.1 L   (0.2-1.3)  mg/dL


 


AST  72 H   (14-36)  U/L


 


ALT  41 H   (4-34)  U/L


 


Total Protein  5.2 L   (6.3-8.2)  g/dL


 


Albumin  2.6 L   (3.5-5.0)  g/dL


 


Urine Protein   Trace H  (Negative)

## 2023-04-24 NOTE — P.PN
Subjective


Progress Note Date: 04/23/23








Patient is a 67-year-old male with a known history of chronic atrial 

fibrillation on anticoagulation with Eliquis, cardiomyopathy status post ICD 

placement, COPD on home oxygen at 4 L via nasal cannula, hypertension, 

anxiety/depression and history of femoral neck fracture s/p repair in January 2023 was brought to the hospital by EMS status postcardiac arrest.  Patient was 

at [facility] where she was found to have worsening shortness of breath and became 

unresponsive on a motorized scooter..  Patient underwent CPR for about 15 

minutes by EMS and was given 3 doses of epinephrine with return of spontaneous 

circulation and was intubated.  Patient was brought to ER for evaluation.  

Patient was unresponsive and was able to provide any history.


On arrival chest x-ray showed cardiomegaly and mild pulmonary vascular 

congestion.  Correlate with BNP for congestive heart failure.  Right basilar 

patchy airspace opacities may represent pulmonary edema versus infiltrate.  

Endotracheal tube in appropriate position.  Possible NG tube terminating in the 

mid esophagus.


CT head showed no acute intracranial process.  Paranasal sinus disease with air-

fluid level within the left maxillary sinus.  Correlate for acute sinusitis.


Chest CTA showed no evidence of PE.  Bilateral lower lobe and right upper lobe 

consolidation with a scattered multifocal groundglass opacities throughout the 

lungs.  Findings are compatible with pneumonia possibly aspiration.


Multiple acute minimally displaced rib fractures likely related to CPR in the 

setting of cardiac arrest.  Remote to subacute bilateral rib fractures also demo

nstrated.  No pneumothorax.  Cardiomegaly.


Laboratory data showed WBC 9.7 hemoglobin 11.4 platelets 246 and D-dimer 3.1


Initial ABG showed pH of 7.17 PCO2 30 and PO2 75


Sodium 135, potassium 4.3, chloride 104, bicarb is 17 BUN 12 and creatinine 0.76

and blood sugar was 325 and lactic acid 2.9 AST 57 ALT 14 alk phos 73 and 

troponin x1 negative


proBNP 2290


Influenza A B RSV and COVID-19 PCR not detected.





4/16/2023


Patient is in the MICU.  Remains mechanical ventilator.  Off pressor support.


Otherwise patient remains unresponsive.  EEG was ordered and neurology is on 

board.


2D echocardiogram showed ejection fraction 30 to 35% with anterior septal 

hypokinesis.  AICD C interrogation was done.


Cardiology neurology and pulmonary is on board.


Chest x-ray showed persistent right midlung and bibasilar multifocal acute 

infiltrates without edema.  No change from 1 day.


Patient remains on antibiotics Zosyn.  NG tube in place.





4/17/2023





Patient is seen and evaluated and follow-up in the ICU remains on mechanical 

ventilation, FiO2 is 60% with a peep of 5.  Patient is off pressor support 

although continues on as needed Ativan along with IV Keppra and propofol.  

Patient also maintained on IV antibiotics in the form of Zosyn.  Chest x-ray 

today shows stable bilateral interstitial and airspace opacification with 

bilateral small pleural effusions may relate to pulmonary edema or infectious 

etiology.  Blood cultures thus far are negative and sputum culture currently 

pending.  Patient does continue to have low-grade temps 101 early this morning. 

WBC remains within normal limits and pro-calcitonin is 0.49.  Other kidney 

functions within normal limits and blood sugars being monitored.  Per nursing 

staff attempts at weaning were performed although patient became extremely 

tachycardic and restless and not following commands and placed back on sedation.

 An EEG was done although pending at this time.  Repeat CT of the brain early 

this morning shows no acute intracranial hemorrhage or midline shift with mild 

diffuse cerebral atrophy redemonstrated with slightly worsening bilateral spheno

id acute sinusitis.  Multiple medical consultations following an per nursing 

staff Snaps was notified although unsure of family is aware.  Patient was

on the registry.





04/18/2023





Patient is seen and evaluated in the ICU maintained on mechanical ventilation 

with an FiO2 of 55% and PEEP is 5.  Patient is maintained on IV antibiotics and 

continues to have fevers and cultures thus far been negative.  Patient is 

continued on sedation and off pressor support and remains on hypertonic solution

.  Neurology following as well as pulmonary intensivist with overall poor 

prognosis.  Per nursing staff, family has been discussing possible comfort care 

although not ready to wean at this time.  Gift of life also following as patient

is on the registry evaluating the patient.





04/19/2023





Patient continues to be in the ICU on mechanical ventilation with an FiO2 of 50%

and PEEP is 5.  Multiple medical consultations including cardiology, pulmonary 

intensivist, neurology following.  Patient had a CT of the chest abdomen pelvis 

per gift of life services which confirmed bilateral rib fractures involving at 

least 2 ribs through 10 bilaterally with no evidence of acute abdominal process 

and consolidation changes in the lung bases likely secondary to atelectasis and 

hypoventilatory.  Patient is maintained on hypertonic solution along with IV 

Keppra with neurology following and patient remains clinically the same.  No 

further seizure-like activity noted although does continue with jerking and 

twitching type movements once removed from sedation.  Patient is undergoing 

occasional sedation holidays.  Gift of life is following as patient is on the 

registry although has not approached family as of yet and family is considering 

terminale weaning and comfort measures.  Cardiology signed off and will follow 

as needed.  Overall prognosis is poor as neurological status continues to be 

unchanged.





04/20/2023





Patient is seen in follow-up today continues to be on mechanical ventilation 

with an FiO2 of 50% and PEEP is 5.  Patient does continue on sedation along with

IV Keppra and empiric Zosyn and not requiring pressors.  Patient's sodium is 

elevated today at 150 and has been on hypertonic solution that was just 

discontinued and will follow-up with repeat sodium level.  Possible D5 in water.

 Gift of life is following as patient is on the registry and family is agreeable

discussing possible gift of life this Sunday as they are currently awaiting more

family members.  Repeat EEG was done today and continues to show severely 

abnormal EEG due to severely suppressed background and consistent with diffuse 

global encephalopathy. Not following any commands with sedation holidays.  Chest

x-ray today shows bilateral pleural effusions although stable. Patient is 

continuing to have fevers today.





04/21/2023


Patient is seen in follow-up today continues in the ICU for mechanical 

ventilation with no changes to prevent settings.  Patient is a gift of life 

donor and they are following possibly working on pronation this Monday while 

awaiting additional family members and or time.  Patient is currently maintained

on IV antibiotics and per nursing staff did require a small dose of Levophed for

approximately a half hour this morning although blood pressure is maintained at 

this time.  Patient is afebrile and maintained on IV antibiotics in the form of 

Zosyn.





04/22/2023





Patient is seen and evaluated this morning in the ICU with no changes from 

previous day.  Patient remains off sedation and completely comatose unresponsive

being followed by multiple medical consultations.  Patient family awaiting gift 

of life for possible organ donation and more family to arrive.  Awaiting for 

Monday as well for OR availability in the organ donation takes place.  Patient 

is currently afebrile although was having continued low-grade intermittent 

times.  Continuing with supportive care.  Patient remains on mechanical 

ventilation with an FiO2 of 50% and PEEP is 5.





04/23/2023





Patient is seen in follow-up this morning continues to be on mechanical 

ventilation while awaiting other family members and gift of life proceed with 

possible organ donation on Monday.  Patient remains on an FiO2 of 50% with a 

PEEP of 5 and no changes.  Neurologically there has been no changes in family 

continuing to wish to proceed with gift of life as that is what patient wanted. 

Awaiting OR availability as well and will likely proceed on Monday.  Patient is 

currently afebrile and antibiotics being discontinued maintained on some low-

dose propofol.





Review of systems: Unable to obtain as patient is intubated and on sedation








PHYSICAL EXAMINATION: 


Patient is on mechanical ventilator.  FiO2 is currently 50% with a PEEP of 5


HEENT: Normocephalic. Neck is supple. Pupils reactive. Nostrils clear. Oral 

cavity is moist. 


Neck reveals no JVD, carotid bruits, or thyromegaly. 


CHEST EXAMINATION: Trachea is central. Symmetrical expansion.  Bibasilar 

diminished sounds and coarse breath sounds.  No wheezing.


CARDIAC: Normal S1, S2 with no gallops. No murmurs 


ABDOMEN: Soft. Bowel sounds present.  Nontender.  No organomegaly. No abdominal 

bruits. 


Extremities: Trace upper and lower extremity edema noted.  No clubbing or 

cyanosis


Neurologically patient is on mechanical ventilator.  Currently on sedation


Skin: No rash or skin lesions. 


Psychiatric: Could not be assessed at this time


Musculoskeletal: No joint swelling or deformity.





Assessment:





Acute cardiac arrest s/p CPR for about 15 minutes with return of spontaneous 

circulation.  Status post intubation by EMS.


Episodes of nonsustained V. tach prior to cardiac arrest which is most likely 

the cause although awaiting full AICD interrogation report


Possible anoxic brain injury with severe encephalopathy noted an EEG


Chronic atrial fibrillation on anticoagulant Eliquis


History of ICD placement


Hyperglycemia


COPD history 


Chronic hypoxic respiratory failure secondary to COPD on 4 L oxygen via nasal 

cannula


Recent history of femoral neck fracture repair in January 2023


History of IVC filter placement


Anxiety/depression history


Full code while awaiting gift of life





Plan:





Patient is currently in the MICU and remains on mechanical ventilation with an 

FiO2 of 50% and PEEP is 5.  Status post CPR and currently on mechanical 

ventilator.  Downtime was was about 15 minutes.  Possible anoxic brain injury. 

EEG repeated remains with severe encephalopathy maintained on IV Keppra with 

neurology following.  


Continuing with supportive care while awaiting gift of life and possible organ 

donation on Monday 


Multiple medical consultations following and continuing supportive care until 

Monday for possible organ donation


Given the downtime and severity of neurological assessment and cardiac arrest, 

overall prognosis remains poor. Gift of life also following and evaluating this 

patient is on registry


Family is agreeable with gift of life and donation may possibly take place on 

Monday when there is OR time available and when more family members are present








The impression and plan of care has been dictated by Myrtle Hyman, Nurse 

Practitioner as directed.





Dr. Chris MD


I have performed a history and examination and MDM of this patient, discussed 

the same with the dictator, and  agree with the dictator's assessment and plan 

as written ,documented as a scribe. Based on total visit time,  I have performed

more than 50% of the visit.  





Objective





- Vital Signs


Vital signs: 


                                   Vital Signs











Temp  99.9 F H  04/23/23 04:00


 


Pulse  72   04/23/23 07:00


 


Resp  26 H  04/23/23 06:00


 


BP  117/52   04/22/23 23:00


 


Pulse Ox  95   04/23/23 07:00


 


FiO2  50   04/23/23 04:00








                                 Intake & Output











 04/22/23 04/23/23 04/23/23





 18:59 06:59 18:59


 


Intake Total 1255 1192 82


 


Output Total 750 510 50


 


Balance 505 682 32


 


Weight  92.7 kg 


 


Intake:   


 


   563 33


 


    .9NS  330 30


 


    0.9NS Pressure Bag 36 33 3


 


    Phenytoin Sodium Inj 200 36  





    mg In Sodium Chloride 0.9   





    % 36 ml @ 80 mls/hr IVPB   





    Q12HR MIKHAIL Rx#:[id number]   


 


    Piperacillin-Tazobactam 3 25 100 





    .375 gm In Sodium   





    Chloride 0.9% 100 ml @ 25   





    mls/hr IVPB Q8H MIKHAIL Rx#:   





    [id number]   


 


    levETIRAcetam IV 1,500 mg 100 100 





    In Saline 1 100ml.bag @   





    400 mls/hr IVPB Q12HR MIKHAIL   





    Rx#:[id number]   


 


  Intake, IV Titration  0 





  Amount   


 


    propofoL 1,000 mg In  0 





    Empty Bag 1 bag @ 15 MCG/   





    KG/MIN 7.348 mls/hr IV .   





    [id number] MIKHAIL Rx#:[id number]   


 


  Tube Feeding 588 539 49


 


  Other 90 90 


 


Output:   


 


  Urine 750 510 50


 


Other:   


 


  Voiding Method Indwelling Catheter Indwelling Catheter 








                       ABP, PAP, CO, CI - Last Documented











Arterial Blood Pressure        123/50

















- Labs


CBC & Chem 7: 


                                 04/23/23 21:03





                                 04/23/23 21:03


Labs: 


                  Abnormal Lab Results - Last 24 Hours (Table)











  04/22/23 04/22/23 04/22/23 Range/Units





  11:50 17:57 18:05 


 


ABG HCO3     (21-25)  mmol/L


 


ABG Total CO2     (19-24)  mmol/L


 


POC Glucose (mg/dL)  177 H  152 H  152 H  ()  mg/dL














  04/22/23 04/23/23 04/23/23 Range/Units





  23:48 05:46 05:46 


 


ABG HCO3   27 H   (21-25)  mmol/L


 


ABG Total CO2   28 H   (19-24)  mmol/L


 


POC Glucose (mg/dL)  143 H   161 H  ()  mg/dL

## 2023-04-24 NOTE — P.PN
Subjective





PROGRESS NOTE


The patient is a 67-year-old female with a known history of CAD, cardiomyopathy,

ICD implantation, atrial fibrillation who presented with cardiac arrest, CPR and

downtime of about 15 minutes who is intubated, unresponsive.  Her echocardiogram

showed an ejection fraction of 30-35% with segmental wall motion abnormality.  

Her blood pressure has been stable but she is having episodes of ventricular 

ectopic activity and short burst of nonsustained ventricular tachycardia.  

According to the device interrogation no defibrillation or shock were done.  She

had an episode of possible nonsustained VT treated with ATP.  She is off vasopr

essors.  She is in sinus mechanism.  Computed tomography scan of the head showed

no acute changes


April 18:


The patient remains intubated, unresponsive, was febrile.  Her blood pressure 

was elevated, under better control at this time.  She continues to be in sinus 

mechanism.  There is no evidence of malignant arrhythmia.  Her urinary output is

stable.  Her computed tomography scan of the chest showed no acute intracranial 

hemorrhage with mild diffuse cerebral atrophy.  She was evaluated by the n

eurology service, her EEG showed severe encephalopathy.


April 19:


The patient remains intubated, unresponsive.  Hemodynamically stable with no 

evidence of ventricular ectopic activity or atrial fibrillation.  Urinary output

and oxygenation stable.  Family is considering gift of life on Friday.  No s

ignificant change in the status since yesterday.





4/24


Patient seen and examined.  Patient's blood pressure borderline.  She is being 

evaluated for gift of life. 








PHYSICAL EXAMINATION:


Vitals reviewed


LUNGS: Clear to auscultation, anteriorly


HEART: Regular rate and rhythm, S1, S2. No S3.  systolic ejection murmur


ABDOMEN: Soft, positive bowel sounds, no organomegaly


EXTREMETIES: No edema





LAB:


Hemoglobin 8.6, potassium 3.5, BUN 18, creatinine 0.50


IMPRESSION:


1.  Status post cardiopulmonary arrest with down time of 15 minutes with 

evidence of anoxic encephalopathy, no evidence of improvement so far


2.  History of ischemic heart disease with severely impaired systolic function 

and ICD implantation


3.  History of prior PCI


4.  Paroxysmal atrial fibrillation


5.  History of hypertension, elevated


6.  History of hyperlipidemia


7.  Febrile episode could be related to central fever, resolved





PLAN:


Patient without significant neurologic recovery.  Being evaluated for gift of 

life likely to be performed today.  Continue current supportive care.








Objective





- Vital Signs


Vital signs: 


                                   Vital Signs











Temp  99.6 F   04/24/23 04:00


 


Pulse  82   04/24/23 07:00


 


Resp  20   04/24/23 07:00


 


BP  117/52   04/22/23 23:00


 


Pulse Ox  95   04/24/23 07:00


 


FiO2  50   04/24/23 04:00








                                 Intake & Output











 04/23/23 04/24/23 04/24/23





 18:59 06:59 18:59


 


Intake Total 9509.119 2086 23


 


Output Total 665 550 40


 


Balance 507.669 555 -17


 


Weight  91 kg 


 


Intake:   


 


   476 23


 


    .9NS  240 20


 


    0.9NS Pressure Bag 30 36 3


 


    Piperacillin-Tazobactam 3 125 100 





    .375 gm In Sodium   





    Chloride 0.9% 100 ml @ 25   





    mls/hr IVPB Q8H MIKHAIL Rx#:   





    [id number]   


 


    levETIRAcetam IV 1,500 mg 100 100 





    In Saline 1 100ml.bag @   





    400 mls/hr IVPB Q12HR MIKHAIL   





    Rx#:[id number]   


 


  Intake, IV Titration 29.669  





  Amount   


 


    propofoL 1,000 mg In 29.669  





    Empty Bag 1 bag @ 15 MCG/   





    KG/MIN 7.348 mls/hr IV .   





    [id number] MIKHAIL Rx#:[id number]   


 


  Tube Feeding 588 539 


 


  Other 90 90 


 


Output:   


 


  Urine 665 550 40


 


Other:   


 


  Voiding Method Indwelling Catheter Indwelling Catheter 








                       ABP, PAP, CO, CI - Last Documented











Arterial Blood Pressure        108/49

















- Labs


CBC & Chem 7: 


                                 04/24/23 04:48





                                 04/24/23 04:48


Labs: 


                  Abnormal Lab Results - Last 24 Hours (Table)











  04/23/23 04/23/23 04/23/23 Range/Units





  04:50 04:50 12:02 


 


RBC  2.82 L    (3.80-5.40)  m/uL


 


Hgb  9.0 L    (11.4-16.0)  gm/dL


 


Hct  28.9 L    (34.0-46.0)  %


 


MCV  102.5 H    (80.0-100.0)  fL


 


MCHC     (31.0-37.0)  g/dL


 


Lymphocytes #  0.8 L    (1.0-4.8)  k/uL


 


ABG HCO3     (21-25)  mmol/L


 


ABG Total CO2     (19-24)  mmol/L


 


ABG O2 Saturation     (94-97)  %


 


Chloride   113 H   ()  mmol/L


 


BUN   20 H   (7-17)  mg/dL


 


Creatinine   0.45 L   (0.52-1.04)  mg/dL


 


Glucose   155 H   (74-99)  mg/dL


 


POC Glucose (mg/dL)    130 H  ()  mg/dL


 


Calcium   8.1 L   (8.4-10.2)  mg/dL


 


Total Bilirubin     (0.2-1.3)  mg/dL


 


AST     (14-36)  U/L


 


ALT     (4-34)  U/L


 


Total Protein     (6.3-8.2)  g/dL


 


Albumin     (3.5-5.0)  g/dL


 


Urine Protein     (Negative)  














  04/23/23 04/23/23 04/23/23 Range/Units





  13:10 13:11 13:11 


 


RBC   2.84 L   (3.80-5.40)  m/uL


 


Hgb   9.0 L   (11.4-16.0)  gm/dL


 


Hct   29.0 L   (34.0-46.0)  %


 


MCV   102.2 H   (80.0-100.0)  fL


 


MCHC     (31.0-37.0)  g/dL


 


Lymphocytes #   0.9 L   (1.0-4.8)  k/uL


 


ABG HCO3  27 H    (21-25)  mmol/L


 


ABG Total CO2  28 H    (19-24)  mmol/L


 


ABG O2 Saturation     (94-97)  %


 


Chloride    112 H  ()  mmol/L


 


BUN    20 H  (7-17)  mg/dL


 


Creatinine    0.47 L  (0.52-1.04)  mg/dL


 


Glucose    149 H  (74-99)  mg/dL


 


POC Glucose (mg/dL)     ()  mg/dL


 


Calcium    8.0 L  (8.4-10.2)  mg/dL


 


Total Bilirubin     (0.2-1.3)  mg/dL


 


AST    73 H  (14-36)  U/L


 


ALT    40 H  (4-34)  U/L


 


Total Protein    5.5 L  (6.3-8.2)  g/dL


 


Albumin    2.7 L  (3.5-5.0)  g/dL


 


Urine Protein     (Negative)  














  04/23/23 04/23/23 04/23/23 Range/Units





  13:11 17:15 21:01 


 


RBC     (3.80-5.40)  m/uL


 


Hgb     (11.4-16.0)  gm/dL


 


Hct     (34.0-46.0)  %


 


MCV     (80.0-100.0)  fL


 


MCHC     (31.0-37.0)  g/dL


 


Lymphocytes #     (1.0-4.8)  k/uL


 


ABG HCO3    27 H  (21-25)  mmol/L


 


ABG Total CO2    28 H  (19-24)  mmol/L


 


ABG O2 Saturation    97.3 H  (94-97)  %


 


Chloride     ()  mmol/L


 


BUN     (7-17)  mg/dL


 


Creatinine     (0.52-1.04)  mg/dL


 


Glucose     (74-99)  mg/dL


 


POC Glucose (mg/dL)   150 H   ()  mg/dL


 


Calcium     (8.4-10.2)  mg/dL


 


Total Bilirubin     (0.2-1.3)  mg/dL


 


AST     (14-36)  U/L


 


ALT     (4-34)  U/L


 


Total Protein     (6.3-8.2)  g/dL


 


Albumin     (3.5-5.0)  g/dL


 


Urine Protein  Trace H    (Negative)  














  04/23/23 04/23/23 04/24/23 Range/Units





  21:03 21:03 00:19 


 


RBC  2.73 L    (3.80-5.40)  m/uL


 


Hgb  8.6 L    (11.4-16.0)  gm/dL


 


Hct  28.0 L    (34.0-46.0)  %


 


MCV  102.6 H    (80.0-100.0)  fL


 


MCHC  30.9 L    (31.0-37.0)  g/dL


 


Lymphocytes #  0.6 L    (1.0-4.8)  k/uL


 


ABG HCO3     (21-25)  mmol/L


 


ABG Total CO2     (19-24)  mmol/L


 


ABG O2 Saturation     (94-97)  %


 


Chloride   111 H   ()  mmol/L


 


BUN   19 H   (7-17)  mg/dL


 


Creatinine     (0.52-1.04)  mg/dL


 


Glucose   156 H   (74-99)  mg/dL


 


POC Glucose (mg/dL)    139 H  ()  mg/dL


 


Calcium   7.8 L   (8.4-10.2)  mg/dL


 


Total Bilirubin     (0.2-1.3)  mg/dL


 


AST   71 H   (14-36)  U/L


 


ALT   40 H   (4-34)  U/L


 


Total Protein   5.3 L   (6.3-8.2)  g/dL


 


Albumin   2.6 L   (3.5-5.0)  g/dL


 


Urine Protein     (Negative)  














  04/24/23 04/24/23 04/24/23 Range/Units





  04:46 04:48 04:48 


 


RBC   2.66 L   (3.80-5.40)  m/uL


 


Hgb   8.5 L   (11.4-16.0)  gm/dL


 


Hct   27.2 L   (34.0-46.0)  %


 


MCV   102.3 H   (80.0-100.0)  fL


 


MCHC     (31.0-37.0)  g/dL


 


Lymphocytes #     (1.0-4.8)  k/uL


 


ABG HCO3  27 H    (21-25)  mmol/L


 


ABG Total CO2  28 H    (19-24)  mmol/L


 


ABG O2 Saturation     (94-97)  %


 


Chloride    111 H  ()  mmol/L


 


BUN    18 H  (7-17)  mg/dL


 


Creatinine    0.48 L  (0.52-1.04)  mg/dL


 


Glucose    142 H  (74-99)  mg/dL


 


POC Glucose (mg/dL)     ()  mg/dL


 


Calcium    7.9 L  (8.4-10.2)  mg/dL


 


Total Bilirubin    0.1 L  (0.2-1.3)  mg/dL


 


AST    72 H  (14-36)  U/L


 


ALT    41 H  (4-34)  U/L


 


Total Protein    5.2 L  (6.3-8.2)  g/dL


 


Albumin    2.6 L  (3.5-5.0)  g/dL


 


Urine Protein     (Negative)

## 2023-04-26 NOTE — P.DS
Providers


Date of admission: 


04/15/23 12:39





Expected date of discharge: 23


Attending physician: 


Sera Billingsley





Consults: 





                                        





04/15/23 12:39


Consult Physician Stat 


   Consulting Provider: Juanpablo Brannon


   Consult Reason/Comments: critical care


   Do you want consulting provider notified?: Already Contacted


Consult Physician Urgent 


   Consulting Provider: Goran Maier


   Consult Reason/Comments: cardiac arrest


   Do you want consulting provider notified?: Yes





04/15/23 14:28


Consult Physician Routine 


   Consulting Provider: Allen Brannon


   Consult Reason/Comments: cardiac arrest with 15 minute down time


   Do you want consulting provider notified?: Yes











Primary care physician: 


Stated None





Hospital Course: 











Preliminary cause of death





Congestive heart failure





Final diagnosis





Acute cardiac arrest s/p CPR for about 15 minutes with return of spontaneous 

circulation.  Status post intubation by EMS.


Episodes of nonsustained V. tach prior to cardiac arrest which is most likely 

the cause although awaiting full AICD interrogation report


Possible anoxic brain injury with severe encephalopathy noted an EEG


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


Chronic atrial fibrillation on anticoagulant Eliquis


History of ICD placement


Hyperglycemia


COPD history 


Chronic hypoxic respiratory failure secondary to COPD on 4 L oxygen via nasal 

cannula


Recent history of femoral neck fracture repair in 2023


History of IVC filter placement


Anxiety/depression history





Discharge disposition


Patient has .  According to nursing documentation, time of death was 

1420.  Please refer to nursing documentation for further information.  Patient 

was gift of life registrant,  gift of life was following.





Hospital course





This is a 67-year-old female who was brought in after V. fib cardiac arrest in a

store and intubated and maintained in the ICU for quite some time.  Concerns 

anoxic brain injury and did have some swelling requiring hypertonic solution.  

Patient did not recover or wake up and maintained on mechanical ventilation.  

Patient was gift of life registrant and they were following this patient was to 

be a donor.  Please refer to other consultation notes for further HPI.  Multiple

family members were present and patient  1420.  Overall prognosis 

remained poor.





The impression and plan of care has been dictated by Myrtle Hyman, Nurse 

Practitioner as directed.





Dr. Chris MD


I have performed a history and examination and MDM of this patient, discussed 

the same with the dictator, and  agree with the dictator's assessment and plan 

as written ,documented as a scribe. Based on total visit time,  I have performed

more than 50% of the visit.  





Patient Condition at Discharge: Poor





Plan - Discharge Summary


Discharge Rx Participant: No


New Discharge Prescriptions: 


No Action


   carvediloL [Coreg] 3.125 mg PO BID


   Dicyclomine [Bentyl] 10 mg PO QID PRN


     PRN Reason: ibs


   Carbidopa/Levodopa [Sinemet  mg Tablet] 1 tab PO BID


   Apixaban [Eliquis] 5 mg PO BID


   Liothyronine Sodium [Cytomel] 25 mcg PO DAILY


   Atorvastatin [Lipitor] 40 mg PO HS


   ARIPiprazole [Abilify] 2 mg PO DAILY


   Sertraline [Zoloft] 200 mg PO DAILY


   Nitroglycerin Sl Tabs [Nitrostat] 0.4 mg SL Q5M PRN


     PRN Reason: Chest Pain


   traZODone HCL [Desyrel] 100 mg PO DAILY@0400


   traZODone  mg PO HS


   Isosorbide Mononitrate ER [Imdur] 60 mg PO DAILY


   Furosemide [Lasix] 40 mg PO DAILY


   Alendronate Sodium [Fosamax] 70 mg PO Q7D


   lisinopriL [Zestril] 5 mg PO DAILY


Discharge Medication List





ARIPiprazole [Abilify] 2 mg PO DAILY 01/10/23 [History]


Alendronate Sodium [Fosamax] 70 mg PO Q7D 01/10/23 [History]


Apixaban [Eliquis] 5 mg PO BID 01/10/23 [History]


Atorvastatin [Lipitor] 40 mg PO HS 01/10/23 [History]


Carbidopa/Levodopa [Sinemet  mg Tablet] 1 tab PO BID 01/10/23 [History]


Dicyclomine [Bentyl] 10 mg PO QID PRN 01/10/23 [History]


Furosemide [Lasix] 40 mg PO DAILY 01/10/23 [History]


Isosorbide Mononitrate ER [Imdur] 60 mg PO DAILY 01/10/23 [History]


Liothyronine Sodium [Cytomel] 25 mcg PO DAILY 01/10/23 [History]


Nitroglycerin Sl Tabs [Nitrostat] 0.4 mg SL Q5M PRN 01/10/23 [History]


Sertraline [Zoloft] 200 mg PO DAILY 01/10/23 [History]


carvediloL [Coreg] 3.125 mg PO BID 01/10/23 [History]


traZODone  mg PO HS 01/10/23 [History]


traZODone HCL [Desyrel] 100 mg PO DAILY@0400 01/10/23 [History]


lisinopriL [Zestril] 5 mg PO DAILY 04/15/23 [History]








Follow up Appointment(s)/Referral(s): 


None,Stated [Primary Care Provider] - 1-2 days


Discharge Disposition: 





- Preliminary Cause of Death


Preliminary Cause of Death: Congestive heart failure